# Patient Record
Sex: FEMALE | Race: WHITE | Employment: FULL TIME | URBAN - NONMETROPOLITAN AREA
[De-identification: names, ages, dates, MRNs, and addresses within clinical notes are randomized per-mention and may not be internally consistent; named-entity substitution may affect disease eponyms.]

---

## 2017-01-08 ENCOUNTER — OFFICE VISIT (OUTPATIENT)
Dept: PRIMARY CARE CLINIC | Age: 37
End: 2017-01-08

## 2017-01-08 VITALS
OXYGEN SATURATION: 95 % | SYSTOLIC BLOOD PRESSURE: 114 MMHG | DIASTOLIC BLOOD PRESSURE: 86 MMHG | HEART RATE: 63 BPM | TEMPERATURE: 97.6 F | BODY MASS INDEX: 19.88 KG/M2 | HEIGHT: 62 IN | RESPIRATION RATE: 16 BRPM | WEIGHT: 108 LBS

## 2017-01-08 DIAGNOSIS — G43.909 MIGRAINE WITHOUT STATUS MIGRAINOSUS, NOT INTRACTABLE, UNSPECIFIED MIGRAINE TYPE: Primary | ICD-10-CM

## 2017-01-08 PROCEDURE — 99213 OFFICE O/P EST LOW 20 MIN: CPT | Performed by: FAMILY MEDICINE

## 2017-01-08 PROCEDURE — 96372 THER/PROPH/DIAG INJ SC/IM: CPT | Performed by: FAMILY MEDICINE

## 2017-01-08 RX ORDER — PROMETHAZINE HYDROCHLORIDE 25 MG/ML
25 INJECTION, SOLUTION INTRAMUSCULAR; INTRAVENOUS ONCE
Status: COMPLETED | OUTPATIENT
Start: 2017-01-08 | End: 2017-01-08

## 2017-01-08 RX ORDER — KETOROLAC TROMETHAMINE 30 MG/ML
60 INJECTION, SOLUTION INTRAMUSCULAR; INTRAVENOUS ONCE
Status: COMPLETED | OUTPATIENT
Start: 2017-01-08 | End: 2017-01-08

## 2017-01-08 RX ADMIN — PROMETHAZINE HYDROCHLORIDE 25 MG: 25 INJECTION, SOLUTION INTRAMUSCULAR; INTRAVENOUS at 16:25

## 2017-01-08 RX ADMIN — KETOROLAC TROMETHAMINE 60 MG: 30 INJECTION, SOLUTION INTRAMUSCULAR; INTRAVENOUS at 16:22

## 2017-01-08 ASSESSMENT — ENCOUNTER SYMPTOMS
NAUSEA: 1
PHOTOPHOBIA: 1

## 2017-02-17 ENCOUNTER — OFFICE VISIT (OUTPATIENT)
Dept: PRIMARY CARE CLINIC | Age: 37
End: 2017-02-17

## 2017-02-17 VITALS
HEART RATE: 84 BPM | SYSTOLIC BLOOD PRESSURE: 112 MMHG | DIASTOLIC BLOOD PRESSURE: 78 MMHG | RESPIRATION RATE: 18 BRPM | WEIGHT: 109.8 LBS | BODY MASS INDEX: 20.2 KG/M2 | OXYGEN SATURATION: 98 % | TEMPERATURE: 100 F | HEIGHT: 62 IN

## 2017-02-17 DIAGNOSIS — R50.81 FEVER IN OTHER DISEASES: ICD-10-CM

## 2017-02-17 DIAGNOSIS — J06.9 VIRAL URI: Primary | ICD-10-CM

## 2017-02-17 LAB
INFLUENZA A ANTIBODY: NEGATIVE
INFLUENZA B ANTIBODY: NEGATIVE

## 2017-02-17 PROCEDURE — 99213 OFFICE O/P EST LOW 20 MIN: CPT | Performed by: NURSE PRACTITIONER

## 2017-02-17 PROCEDURE — 87804 INFLUENZA ASSAY W/OPTIC: CPT | Performed by: NURSE PRACTITIONER

## 2017-02-17 ASSESSMENT — ENCOUNTER SYMPTOMS
DIARRHEA: 0
CHOKING: 0
APNEA: 0
PHOTOPHOBIA: 0
COUGH: 1
NAUSEA: 1
WHEEZING: 0
EYE ITCHING: 0
VOICE CHANGE: 0
VOMITING: 0
EYE DISCHARGE: 0
EYE REDNESS: 0
SINUS PRESSURE: 1
SORE THROAT: 0
RHINORRHEA: 0
SHORTNESS OF BREATH: 0

## 2017-04-04 DIAGNOSIS — R55 NEUROCARDIOGENIC SYNCOPE: ICD-10-CM

## 2017-04-04 DIAGNOSIS — E78.2 MIXED HYPERLIPIDEMIA: ICD-10-CM

## 2017-04-04 DIAGNOSIS — R55 NEUROCARDIOGENIC SYNCOPE: Primary | ICD-10-CM

## 2017-04-04 RX ORDER — MIDODRINE HYDROCHLORIDE 5 MG/1
TABLET ORAL
Qty: 270 TABLET | Refills: 0 | Status: SHIPPED | OUTPATIENT
Start: 2017-04-04 | End: 2017-04-18 | Stop reason: SDUPTHER

## 2017-04-18 ENCOUNTER — OFFICE VISIT (OUTPATIENT)
Dept: FAMILY MEDICINE CLINIC | Age: 37
End: 2017-04-18
Payer: COMMERCIAL

## 2017-04-18 VITALS
HEIGHT: 61 IN | DIASTOLIC BLOOD PRESSURE: 70 MMHG | SYSTOLIC BLOOD PRESSURE: 100 MMHG | BODY MASS INDEX: 20.39 KG/M2 | HEART RATE: 72 BPM | WEIGHT: 108 LBS

## 2017-04-18 DIAGNOSIS — G43.009 MIGRAINE WITHOUT AURA AND WITHOUT STATUS MIGRAINOSUS, NOT INTRACTABLE: ICD-10-CM

## 2017-04-18 DIAGNOSIS — R55 NEUROCARDIOGENIC SYNCOPE: Primary | ICD-10-CM

## 2017-04-18 DIAGNOSIS — E78.2 MIXED HYPERLIPIDEMIA: ICD-10-CM

## 2017-04-18 DIAGNOSIS — Z72.0 TOBACCO ABUSE: ICD-10-CM

## 2017-04-18 DIAGNOSIS — N76.0 ACUTE VAGINITIS: ICD-10-CM

## 2017-04-18 DIAGNOSIS — M79.671 RIGHT FOOT PAIN: ICD-10-CM

## 2017-04-18 DIAGNOSIS — R39.9 LOWER URINARY TRACT SYMPTOMS: ICD-10-CM

## 2017-04-18 DIAGNOSIS — J30.2 SEASONAL ALLERGIC RHINITIS, UNSPECIFIED ALLERGIC RHINITIS TRIGGER: ICD-10-CM

## 2017-04-18 PROCEDURE — 87510 GARDNER VAG DNA DIR PROBE: CPT | Performed by: PHYSICIAN ASSISTANT

## 2017-04-18 PROCEDURE — 87480 CANDIDA DNA DIR PROBE: CPT | Performed by: PHYSICIAN ASSISTANT

## 2017-04-18 PROCEDURE — 99214 OFFICE O/P EST MOD 30 MIN: CPT | Performed by: PHYSICIAN ASSISTANT

## 2017-04-18 PROCEDURE — 87660 TRICHOMONAS VAGIN DIR PROBE: CPT | Performed by: PHYSICIAN ASSISTANT

## 2017-04-18 RX ORDER — SIMVASTATIN 20 MG
20 TABLET ORAL NIGHTLY
Qty: 90 TABLET | Refills: 1 | Status: SHIPPED | OUTPATIENT
Start: 2017-04-18 | End: 2017-12-12 | Stop reason: SDUPTHER

## 2017-04-18 RX ORDER — MIDODRINE HYDROCHLORIDE 5 MG/1
TABLET ORAL
Qty: 270 TABLET | Refills: 1 | Status: SHIPPED | OUTPATIENT
Start: 2017-04-18 | End: 2017-11-13 | Stop reason: SDUPTHER

## 2017-04-18 RX ORDER — NICOTINE 21 MG/24HR
1 PATCH, TRANSDERMAL 24 HOURS TRANSDERMAL EVERY 24 HOURS
Qty: 15 PATCH | Refills: 0 | Status: SHIPPED | OUTPATIENT
Start: 2017-04-18 | End: 2017-11-16 | Stop reason: ALTCHOICE

## 2017-04-18 RX ORDER — SUMATRIPTAN 100 MG/1
100 TABLET, FILM COATED ORAL
Qty: 9 TABLET | Refills: 3 | Status: SHIPPED | OUTPATIENT
Start: 2017-04-18 | End: 2017-12-12 | Stop reason: SDUPTHER

## 2017-04-18 RX ORDER — MONTELUKAST SODIUM 10 MG/1
10 TABLET ORAL NIGHTLY
Qty: 90 TABLET | Refills: 1 | Status: SHIPPED | OUTPATIENT
Start: 2017-04-18 | End: 2017-12-12 | Stop reason: SDUPTHER

## 2017-04-18 RX ORDER — OXYBUTYNIN CHLORIDE 5 MG/1
5 TABLET ORAL 3 TIMES DAILY
Qty: 90 TABLET | Refills: 5 | Status: SHIPPED | OUTPATIENT
Start: 2017-04-18 | End: 2017-12-12 | Stop reason: ALTCHOICE

## 2017-04-18 RX ORDER — FLUDROCORTISONE ACETATE 0.1 MG/1
TABLET ORAL
Qty: 90 TABLET | Refills: 1 | Status: SHIPPED | OUTPATIENT
Start: 2017-04-18 | End: 2017-12-12 | Stop reason: SDUPTHER

## 2017-04-18 RX ORDER — NICOTINE 21 MG/24HR
1 PATCH, TRANSDERMAL 24 HOURS TRANSDERMAL EVERY 24 HOURS
Qty: 45 PATCH | Refills: 0 | Status: SHIPPED | OUTPATIENT
Start: 2017-04-18 | End: 2018-05-02 | Stop reason: ALTCHOICE

## 2017-04-18 ASSESSMENT — PATIENT HEALTH QUESTIONNAIRE - PHQ9
2. FEELING DOWN, DEPRESSED OR HOPELESS: 0
1. LITTLE INTEREST OR PLEASURE IN DOING THINGS: 0
SUM OF ALL RESPONSES TO PHQ9 QUESTIONS 1 & 2: 0
SUM OF ALL RESPONSES TO PHQ QUESTIONS 1-9: 0

## 2017-04-19 LAB
DIRECT EXAM: NORMAL
Lab: NORMAL
SPECIMEN DESCRIPTION: NORMAL
SPECIMEN DESCRIPTION: NORMAL
STATUS: NORMAL

## 2017-05-06 ENCOUNTER — OFFICE VISIT (OUTPATIENT)
Dept: PRIMARY CARE CLINIC | Age: 37
End: 2017-05-06
Payer: COMMERCIAL

## 2017-05-06 VITALS
HEART RATE: 64 BPM | TEMPERATURE: 98.2 F | DIASTOLIC BLOOD PRESSURE: 74 MMHG | OXYGEN SATURATION: 98 % | HEIGHT: 62 IN | RESPIRATION RATE: 16 BRPM | WEIGHT: 111.4 LBS | BODY MASS INDEX: 20.5 KG/M2 | SYSTOLIC BLOOD PRESSURE: 112 MMHG

## 2017-05-06 DIAGNOSIS — J01.41 ACUTE RECURRENT PANSINUSITIS: Primary | ICD-10-CM

## 2017-05-06 PROCEDURE — 99213 OFFICE O/P EST LOW 20 MIN: CPT | Performed by: FAMILY MEDICINE

## 2017-05-06 RX ORDER — DOXYCYCLINE HYCLATE 100 MG/1
100 CAPSULE ORAL 2 TIMES DAILY
Qty: 20 CAPSULE | Refills: 0 | Status: SHIPPED | OUTPATIENT
Start: 2017-05-06 | End: 2017-05-16

## 2017-05-06 ASSESSMENT — ENCOUNTER SYMPTOMS
COUGH: 1
VOMITING: 0
SINUS COMPLAINT: 1
RHINORRHEA: 0
DIARRHEA: 0
WHEEZING: 0
NAUSEA: 0
SINUS PRESSURE: 1
SHORTNESS OF BREATH: 1
SORE THROAT: 1

## 2017-10-26 ENCOUNTER — TELEPHONE (OUTPATIENT)
Dept: PRIMARY CARE CLINIC | Age: 37
End: 2017-10-26

## 2017-11-13 DIAGNOSIS — R55 NEUROCARDIOGENIC SYNCOPE: ICD-10-CM

## 2017-11-13 RX ORDER — MIDODRINE HYDROCHLORIDE 5 MG/1
TABLET ORAL
Qty: 270 TABLET | Refills: 0 | Status: SHIPPED | OUTPATIENT
Start: 2017-11-13 | End: 2017-12-12 | Stop reason: SDUPTHER

## 2017-11-16 ENCOUNTER — OFFICE VISIT (OUTPATIENT)
Dept: PRIMARY CARE CLINIC | Age: 37
End: 2017-11-16
Payer: COMMERCIAL

## 2017-11-16 VITALS
TEMPERATURE: 97.4 F | RESPIRATION RATE: 12 BRPM | HEART RATE: 69 BPM | WEIGHT: 107.2 LBS | DIASTOLIC BLOOD PRESSURE: 66 MMHG | SYSTOLIC BLOOD PRESSURE: 118 MMHG | BODY MASS INDEX: 20.24 KG/M2 | HEIGHT: 61 IN | OXYGEN SATURATION: 98 %

## 2017-11-16 DIAGNOSIS — J01.90 ACUTE BACTERIAL SINUSITIS: Primary | ICD-10-CM

## 2017-11-16 DIAGNOSIS — B96.89 ACUTE BACTERIAL SINUSITIS: Primary | ICD-10-CM

## 2017-11-16 PROCEDURE — 99213 OFFICE O/P EST LOW 20 MIN: CPT | Performed by: NURSE PRACTITIONER

## 2017-11-16 RX ORDER — AZITHROMYCIN 250 MG/1
TABLET, FILM COATED ORAL
Qty: 1 PACKET | Refills: 0 | Status: SHIPPED | OUTPATIENT
Start: 2017-11-16 | End: 2017-11-26

## 2017-11-16 ASSESSMENT — ENCOUNTER SYMPTOMS
CONSTIPATION: 0
COUGH: 1
CHEST TIGHTNESS: 0
TROUBLE SWALLOWING: 0
DIARRHEA: 0
SINUS PRESSURE: 1
SHORTNESS OF BREATH: 0
NAUSEA: 0
ABDOMINAL PAIN: 0
ALLERGIC/IMMUNOLOGIC NEGATIVE: 1
EYES NEGATIVE: 1
VOMITING: 0
RHINORRHEA: 1
SINUS PAIN: 1

## 2017-11-16 NOTE — PROGRESS NOTES
[address] [facility]  [address], [address] Wendi Luna  [phone]      Rm Barney is a 42 y.o. female who is c/o of Head Congestion (started about a week ago, ear pain-feels like she has water in them)      HPI:     HPI Patient in today for an acute visit for symptoms of head congestion and otalgia of her ear that started a week ago. Jesús fevers. Her  is sick with the same symptoms . States that she has tried Sudafed and cough syrup with some relief with the cough syrup. States that her cough is worse at night. States that she does take allegra routinely. Subjective:      Review of Systems   Constitutional: Negative for activity change, appetite change and fever. HENT: Positive for congestion, ear pain, postnasal drip, rhinorrhea, sinus pain, sinus pressure and sneezing. Negative for ear discharge and trouble swallowing. Eyes: Negative. Respiratory: Positive for cough (productive and worse at night). Negative for chest tightness and shortness of breath. Cardiovascular: Negative for chest pain and palpitations. Gastrointestinal: Negative for abdominal pain, constipation, diarrhea, nausea and vomiting. Endocrine: Negative. Genitourinary: Negative for difficulty urinating and dysuria. Musculoskeletal: Negative. Skin: Negative. Allergic/Immunologic: Negative. Neurological: Negative for dizziness, light-headedness and headaches. Hematological: Negative. Psychiatric/Behavioral: Negative. Objective:     Vitals:    11/16/17 1035   BP: 118/66   Site: Right Arm   Position: Sitting   Cuff Size: Medium Adult   Pulse: 69   Resp: 12   Temp: 97.4 °F (36.3 °C)   TempSrc: Tympanic   SpO2: 98%   Weight: 107 lb 3.2 oz (48.6 kg)   Height: 5' 0.75\" (1.543 m)     Physical Exam   Constitutional: She is oriented to person, place, and time. She appears well-developed and well-nourished. HENT:   Head: Normocephalic and atraumatic.

## 2017-12-12 ENCOUNTER — OFFICE VISIT (OUTPATIENT)
Dept: FAMILY MEDICINE CLINIC | Age: 37
End: 2017-12-12
Payer: COMMERCIAL

## 2017-12-12 VITALS
HEIGHT: 61 IN | HEART RATE: 76 BPM | DIASTOLIC BLOOD PRESSURE: 62 MMHG | SYSTOLIC BLOOD PRESSURE: 122 MMHG | WEIGHT: 106.2 LBS | BODY MASS INDEX: 20.05 KG/M2

## 2017-12-12 DIAGNOSIS — Z23 NEED FOR PNEUMOCOCCAL VACCINATION: ICD-10-CM

## 2017-12-12 DIAGNOSIS — J01.41 ACUTE RECURRENT PANSINUSITIS: ICD-10-CM

## 2017-12-12 DIAGNOSIS — G43.009 MIGRAINE WITHOUT AURA AND WITHOUT STATUS MIGRAINOSUS, NOT INTRACTABLE: ICD-10-CM

## 2017-12-12 DIAGNOSIS — J32.4 CHRONIC PANSINUSITIS: ICD-10-CM

## 2017-12-12 DIAGNOSIS — R39.9 LOWER URINARY TRACT SYMPTOMS: ICD-10-CM

## 2017-12-12 DIAGNOSIS — R55 NEUROCARDIOGENIC SYNCOPE: ICD-10-CM

## 2017-12-12 DIAGNOSIS — E78.2 MIXED HYPERLIPIDEMIA: Primary | ICD-10-CM

## 2017-12-12 DIAGNOSIS — Z23 NEED FOR TDAP VACCINATION: ICD-10-CM

## 2017-12-12 PROCEDURE — 99214 OFFICE O/P EST MOD 30 MIN: CPT | Performed by: PHYSICIAN ASSISTANT

## 2017-12-12 RX ORDER — SIMVASTATIN 20 MG
20 TABLET ORAL NIGHTLY
Qty: 90 TABLET | Refills: 1 | Status: SHIPPED | OUTPATIENT
Start: 2017-12-12 | End: 2018-12-18 | Stop reason: SDUPTHER

## 2017-12-12 RX ORDER — FLUDROCORTISONE ACETATE 0.1 MG/1
TABLET ORAL
Qty: 90 TABLET | Refills: 1 | Status: SHIPPED | OUTPATIENT
Start: 2017-12-12 | End: 2018-12-18 | Stop reason: SDUPTHER

## 2017-12-12 RX ORDER — MONTELUKAST SODIUM 10 MG/1
10 TABLET ORAL NIGHTLY
Qty: 90 TABLET | Refills: 1 | Status: SHIPPED | OUTPATIENT
Start: 2017-12-12 | End: 2018-12-18 | Stop reason: SDUPTHER

## 2017-12-12 RX ORDER — MIDODRINE HYDROCHLORIDE 5 MG/1
TABLET ORAL
Qty: 270 TABLET | Refills: 1 | Status: SHIPPED | OUTPATIENT
Start: 2017-12-12 | End: 2018-12-18 | Stop reason: SDUPTHER

## 2017-12-12 RX ORDER — SUMATRIPTAN 100 MG/1
100 TABLET, FILM COATED ORAL
Qty: 9 TABLET | Refills: 3 | Status: SHIPPED | OUTPATIENT
Start: 2017-12-12 | End: 2018-10-28 | Stop reason: ALTCHOICE

## 2017-12-12 RX ORDER — SULFAMETHOXAZOLE AND TRIMETHOPRIM 800; 160 MG/1; MG/1
1 TABLET ORAL 2 TIMES DAILY
Qty: 20 TABLET | Refills: 0 | Status: SHIPPED | OUTPATIENT
Start: 2017-12-12 | End: 2018-03-10 | Stop reason: SDUPTHER

## 2017-12-12 RX ORDER — TOLTERODINE 4 MG/1
4 CAPSULE, EXTENDED RELEASE ORAL DAILY
Qty: 30 CAPSULE | Refills: 6 | Status: SHIPPED | OUTPATIENT
Start: 2017-12-12 | End: 2018-05-02

## 2017-12-17 NOTE — PROGRESS NOTES
affect. Assessment:      1. Mixed hyperlipidemia  simvastatin (ZOCOR) 20 MG tablet   2. Neurocardiogenic syncope  metoprolol tartrate (LOPRESSOR) 25 MG tablet    fludrocortisone (FLORINEF) 0.1 MG tablet    midodrine (PROAMATINE) 5 MG tablet   3. Migraine without aura and without status migrainosus, not intractable  SUMAtriptan (IMITREX) 100 MG tablet   4. Lower urinary tract symptoms  tolterodine (DETROL LA) 4 MG extended release capsule   5. Acute recurrent pansinusitis  sulfamethoxazole-trimethoprim (BACTRIM DS) 800-160 MG per tablet   6. Chronic pansinusitis  montelukast (SINGULAIR) 10 MG tablet   7. Need for Tdap vaccination  Tdap (age 6y and older) IM ([employer])   8. Need for pneumococcal vaccination  Pneumococcal polysaccharide vaccine 23-valent >= 1yo subcutaneous/IM (PNEUMOVAX 23)           Plan:      Update fasting laboratory studies. Healthy diet and routine exercise. Trial of Detrol LA 4 mg daily. Continue Singulair. Bactrim DS bid x 10 days. Nasal saline rinses. Continue chronic medications which were refilled for patient. Prescriptions for Tdap and Pneumovax given to patient. Follow-up in six months/sooner PRN.

## 2017-12-26 ENCOUNTER — TELEPHONE (OUTPATIENT)
Dept: FAMILY MEDICINE CLINIC | Age: 37
End: 2017-12-26

## 2017-12-30 ENCOUNTER — OFFICE VISIT (OUTPATIENT)
Dept: PRIMARY CARE CLINIC | Age: 37
End: 2017-12-30
Payer: COMMERCIAL

## 2017-12-30 ENCOUNTER — HOSPITAL ENCOUNTER (OUTPATIENT)
Dept: LAB | Age: 37
Setting detail: SPECIMEN
Discharge: HOME | End: 2017-12-30
Payer: COMMERCIAL

## 2017-12-30 VITALS
HEIGHT: 61 IN | TEMPERATURE: 97.3 F | SYSTOLIC BLOOD PRESSURE: 110 MMHG | OXYGEN SATURATION: 97 % | DIASTOLIC BLOOD PRESSURE: 70 MMHG | WEIGHT: 105 LBS | HEART RATE: 100 BPM | BODY MASS INDEX: 19.83 KG/M2

## 2017-12-30 DIAGNOSIS — G43.009 MIGRAINE WITHOUT AURA AND WITHOUT STATUS MIGRAINOSUS, NOT INTRACTABLE: Primary | ICD-10-CM

## 2017-12-30 DIAGNOSIS — E78.2 MIXED HYPERLIPIDEMIA: ICD-10-CM

## 2017-12-30 LAB
ALBUMIN SERPL-MCNC: 4.9 G/DL (ref 3.5–5.2)
ALBUMIN/GLOBULIN RATIO: 1.7 (ref 1–2.5)
ALP BLD-CCNC: 77 U/L (ref 35–104)
ALT SERPL-CCNC: 17 U/L (ref 5–33)
ANION GAP SERPL CALCULATED.3IONS-SCNC: 14 MMOL/L (ref 9–17)
AST SERPL-CCNC: 25 U/L
BILIRUB SERPL-MCNC: 0.22 MG/DL (ref 0.3–1.2)
BUN BLDV-MCNC: 17 MG/DL (ref 6–20)
BUN/CREAT BLD: 25 (ref 9–20)
CALCIUM SERPL-MCNC: 9.9 MG/DL (ref 8.6–10.4)
CHLORIDE BLD-SCNC: 99 MMOL/L (ref 98–107)
CHOLESTEROL/HDL RATIO: 5.1
CHOLESTEROL: 204 MG/DL
CO2: 27 MMOL/L (ref 20–31)
CREAT SERPL-MCNC: 0.68 MG/DL (ref 0.5–0.9)
GFR AFRICAN AMERICAN: >60 ML/MIN
GFR NON-AFRICAN AMERICAN: >60 ML/MIN
GFR SERPL CREATININE-BSD FRML MDRD: ABNORMAL ML/MIN/{1.73_M2}
GFR SERPL CREATININE-BSD FRML MDRD: ABNORMAL ML/MIN/{1.73_M2}
GLUCOSE BLD-MCNC: 103 MG/DL (ref 70–99)
HDLC SERPL-MCNC: 40 MG/DL
LDL CHOLESTEROL: 129 MG/DL (ref 0–130)
POTASSIUM SERPL-SCNC: 4.5 MMOL/L (ref 3.7–5.3)
SODIUM BLD-SCNC: 140 MMOL/L (ref 135–144)
TOTAL PROTEIN: 7.8 G/DL (ref 6.4–8.3)
TRIGL SERPL-MCNC: 173 MG/DL
VLDLC SERPL CALC-MCNC: ABNORMAL MG/DL (ref 1–30)

## 2017-12-30 PROCEDURE — 80053 COMPREHEN METABOLIC PANEL: CPT

## 2017-12-30 PROCEDURE — 99213 OFFICE O/P EST LOW 20 MIN: CPT | Performed by: PHYSICIAN ASSISTANT

## 2017-12-30 PROCEDURE — 36415 COLL VENOUS BLD VENIPUNCTURE: CPT

## 2017-12-30 PROCEDURE — 80061 LIPID PANEL: CPT

## 2017-12-30 PROCEDURE — 96372 THER/PROPH/DIAG INJ SC/IM: CPT | Performed by: PHYSICIAN ASSISTANT

## 2017-12-30 RX ORDER — PROMETHAZINE HYDROCHLORIDE 25 MG/ML
25 INJECTION, SOLUTION INTRAMUSCULAR; INTRAVENOUS ONCE
Status: COMPLETED | OUTPATIENT
Start: 2017-12-30 | End: 2017-12-30

## 2017-12-30 RX ORDER — KETOROLAC TROMETHAMINE 30 MG/ML
60 INJECTION, SOLUTION INTRAMUSCULAR; INTRAVENOUS ONCE
Status: COMPLETED | OUTPATIENT
Start: 2017-12-30 | End: 2017-12-30

## 2017-12-30 RX ADMIN — PROMETHAZINE HYDROCHLORIDE 25 MG: 25 INJECTION, SOLUTION INTRAMUSCULAR; INTRAVENOUS at 11:00

## 2017-12-30 RX ADMIN — KETOROLAC TROMETHAMINE 60 MG: 30 INJECTION, SOLUTION INTRAMUSCULAR; INTRAVENOUS at 10:59

## 2017-12-30 ASSESSMENT — ENCOUNTER SYMPTOMS
NAUSEA: 1
VOMITING: 0

## 2018-03-10 ENCOUNTER — OFFICE VISIT (OUTPATIENT)
Dept: PRIMARY CARE CLINIC | Age: 38
End: 2018-03-10
Payer: COMMERCIAL

## 2018-03-10 VITALS
BODY MASS INDEX: 19.46 KG/M2 | TEMPERATURE: 97 F | OXYGEN SATURATION: 98 % | WEIGHT: 103 LBS | SYSTOLIC BLOOD PRESSURE: 100 MMHG | HEART RATE: 80 BPM | DIASTOLIC BLOOD PRESSURE: 78 MMHG

## 2018-03-10 DIAGNOSIS — J01.41 ACUTE RECURRENT PANSINUSITIS: ICD-10-CM

## 2018-03-10 PROCEDURE — 99214 OFFICE O/P EST MOD 30 MIN: CPT | Performed by: FAMILY MEDICINE

## 2018-03-10 RX ORDER — TRIAMCINOLONE ACETONIDE 40 MG/ML
40 INJECTION, SUSPENSION INTRA-ARTICULAR; INTRAMUSCULAR ONCE
Status: COMPLETED | OUTPATIENT
Start: 2018-03-10 | End: 2018-03-10

## 2018-03-10 RX ORDER — SULFAMETHOXAZOLE AND TRIMETHOPRIM 800; 160 MG/1; MG/1
1 TABLET ORAL 2 TIMES DAILY
Qty: 20 TABLET | Refills: 0 | Status: SHIPPED | OUTPATIENT
Start: 2018-03-10 | End: 2018-05-02 | Stop reason: SDUPTHER

## 2018-03-10 RX ADMIN — TRIAMCINOLONE ACETONIDE 40 MG: 40 INJECTION, SUSPENSION INTRA-ARTICULAR; INTRAMUSCULAR at 13:59

## 2018-03-10 ASSESSMENT — ENCOUNTER SYMPTOMS
CONSTIPATION: 0
TROUBLE SWALLOWING: 0
RHINORRHEA: 1
DIARRHEA: 0
ABDOMINAL PAIN: 0
NAUSEA: 0
WHEEZING: 0
SORE THROAT: 1
EYE REDNESS: 0
COUGH: 1
SINUS PRESSURE: 1
VOMITING: 0
SHORTNESS OF BREATH: 0
SINUS PAIN: 1
EYE DISCHARGE: 0

## 2018-05-02 ENCOUNTER — OFFICE VISIT (OUTPATIENT)
Dept: FAMILY MEDICINE CLINIC | Age: 38
End: 2018-05-02
Payer: COMMERCIAL

## 2018-05-02 VITALS
BODY MASS INDEX: 18.31 KG/M2 | DIASTOLIC BLOOD PRESSURE: 62 MMHG | HEIGHT: 61 IN | SYSTOLIC BLOOD PRESSURE: 98 MMHG | OXYGEN SATURATION: 98 % | HEART RATE: 77 BPM | WEIGHT: 97 LBS

## 2018-05-02 DIAGNOSIS — J32.4 CHRONIC PANSINUSITIS: Primary | ICD-10-CM

## 2018-05-02 DIAGNOSIS — J01.41 ACUTE RECURRENT PANSINUSITIS: ICD-10-CM

## 2018-05-02 PROCEDURE — 99213 OFFICE O/P EST LOW 20 MIN: CPT | Performed by: NURSE PRACTITIONER

## 2018-05-02 RX ORDER — SULFAMETHOXAZOLE AND TRIMETHOPRIM 800; 160 MG/1; MG/1
1 TABLET ORAL 2 TIMES DAILY
Qty: 20 TABLET | Refills: 0 | Status: SHIPPED | OUTPATIENT
Start: 2018-05-02 | End: 2018-05-12

## 2018-05-02 RX ORDER — FLUTICASONE PROPIONATE 50 MCG
1 SPRAY, SUSPENSION (ML) NASAL DAILY
Qty: 1 BOTTLE | Refills: 3 | Status: SHIPPED | OUTPATIENT
Start: 2018-05-02 | End: 2018-06-19

## 2018-05-02 ASSESSMENT — ENCOUNTER SYMPTOMS
VOMITING: 0
NAUSEA: 0
CONSTIPATION: 0
SINUS PRESSURE: 1
SINUS PAIN: 1
SHORTNESS OF BREATH: 0
ABDOMINAL PAIN: 0
DIARRHEA: 0
TROUBLE SWALLOWING: 0
CHEST TIGHTNESS: 0
ALLERGIC/IMMUNOLOGIC NEGATIVE: 1
EYES NEGATIVE: 1
COUGH: 1

## 2018-06-03 ENCOUNTER — OFFICE VISIT (OUTPATIENT)
Dept: PRIMARY CARE CLINIC | Age: 38
End: 2018-06-03
Payer: COMMERCIAL

## 2018-06-03 VITALS
WEIGHT: 93 LBS | SYSTOLIC BLOOD PRESSURE: 110 MMHG | DIASTOLIC BLOOD PRESSURE: 70 MMHG | BODY MASS INDEX: 17.57 KG/M2 | HEART RATE: 108 BPM | OXYGEN SATURATION: 98 %

## 2018-06-03 DIAGNOSIS — G43.111 INTRACTABLE MIGRAINE WITH AURA WITH STATUS MIGRAINOSUS: Primary | ICD-10-CM

## 2018-06-03 PROCEDURE — 96372 THER/PROPH/DIAG INJ SC/IM: CPT | Performed by: FAMILY MEDICINE

## 2018-06-03 PROCEDURE — 99214 OFFICE O/P EST MOD 30 MIN: CPT | Performed by: FAMILY MEDICINE

## 2018-06-03 RX ORDER — PROMETHAZINE HYDROCHLORIDE 25 MG/ML
25 INJECTION, SOLUTION INTRAMUSCULAR; INTRAVENOUS ONCE
Status: COMPLETED | OUTPATIENT
Start: 2018-06-03 | End: 2018-06-03

## 2018-06-03 RX ORDER — KETOROLAC TROMETHAMINE 30 MG/ML
30 INJECTION, SOLUTION INTRAMUSCULAR; INTRAVENOUS ONCE
Status: DISCONTINUED | OUTPATIENT
Start: 2018-06-03 | End: 2018-06-03

## 2018-06-03 RX ORDER — KETOROLAC TROMETHAMINE 30 MG/ML
30 INJECTION, SOLUTION INTRAMUSCULAR; INTRAVENOUS ONCE
Status: COMPLETED | OUTPATIENT
Start: 2018-06-03 | End: 2018-06-03

## 2018-06-03 RX ADMIN — PROMETHAZINE HYDROCHLORIDE 25 MG: 25 INJECTION, SOLUTION INTRAMUSCULAR; INTRAVENOUS at 15:46

## 2018-06-03 RX ADMIN — KETOROLAC TROMETHAMINE 30 MG: 30 INJECTION, SOLUTION INTRAMUSCULAR; INTRAVENOUS at 15:58

## 2018-06-03 ASSESSMENT — PATIENT HEALTH QUESTIONNAIRE - PHQ9
1. LITTLE INTEREST OR PLEASURE IN DOING THINGS: 0
SUM OF ALL RESPONSES TO PHQ9 QUESTIONS 1 & 2: 0
2. FEELING DOWN, DEPRESSED OR HOPELESS: 0
SUM OF ALL RESPONSES TO PHQ QUESTIONS 1-9: 0

## 2018-06-03 ASSESSMENT — ENCOUNTER SYMPTOMS
ABDOMINAL PAIN: 0
DIARRHEA: 0
NAUSEA: 1
RESPIRATORY NEGATIVE: 1
PHOTOPHOBIA: 1
VOMITING: 0
VISUAL CHANGE: 1

## 2018-06-12 ENCOUNTER — OFFICE VISIT (OUTPATIENT)
Dept: FAMILY MEDICINE CLINIC | Age: 38
End: 2018-06-12
Payer: COMMERCIAL

## 2018-06-12 VITALS
DIASTOLIC BLOOD PRESSURE: 50 MMHG | SYSTOLIC BLOOD PRESSURE: 90 MMHG | HEART RATE: 76 BPM | BODY MASS INDEX: 18.12 KG/M2 | HEIGHT: 61 IN | WEIGHT: 96 LBS

## 2018-06-12 DIAGNOSIS — E78.2 MIXED HYPERLIPIDEMIA: Primary | ICD-10-CM

## 2018-06-12 DIAGNOSIS — R39.9 LOWER URINARY TRACT SYMPTOMS: ICD-10-CM

## 2018-06-12 DIAGNOSIS — G43.111 INTRACTABLE MIGRAINE WITH AURA WITH STATUS MIGRAINOSUS: ICD-10-CM

## 2018-06-12 DIAGNOSIS — R55 NEUROCARDIOGENIC SYNCOPE: ICD-10-CM

## 2018-06-12 DIAGNOSIS — E55.9 VITAMIN D DEFICIENCY: ICD-10-CM

## 2018-06-12 DIAGNOSIS — R19.8 CHANGE IN BOWEL FUNCTION: ICD-10-CM

## 2018-06-12 PROCEDURE — 99214 OFFICE O/P EST MOD 30 MIN: CPT | Performed by: PHYSICIAN ASSISTANT

## 2018-06-12 ASSESSMENT — PATIENT HEALTH QUESTIONNAIRE - PHQ9
2. FEELING DOWN, DEPRESSED OR HOPELESS: 0
1. LITTLE INTEREST OR PLEASURE IN DOING THINGS: 0
SUM OF ALL RESPONSES TO PHQ QUESTIONS 1-9: 0
SUM OF ALL RESPONSES TO PHQ9 QUESTIONS 1 & 2: 0

## 2018-06-19 ENCOUNTER — INITIAL CONSULT (OUTPATIENT)
Dept: SURGERY | Age: 38
End: 2018-06-19
Payer: COMMERCIAL

## 2018-06-19 VITALS
TEMPERATURE: 96.8 F | RESPIRATION RATE: 14 BRPM | SYSTOLIC BLOOD PRESSURE: 114 MMHG | WEIGHT: 92.8 LBS | HEIGHT: 61 IN | HEART RATE: 66 BPM | DIASTOLIC BLOOD PRESSURE: 66 MMHG | BODY MASS INDEX: 17.52 KG/M2

## 2018-06-19 DIAGNOSIS — K58.2 IRRITABLE BOWEL SYNDROME WITH BOTH CONSTIPATION AND DIARRHEA: Primary | ICD-10-CM

## 2018-06-19 PROCEDURE — 99214 OFFICE O/P EST MOD 30 MIN: CPT | Performed by: SURGERY

## 2018-06-19 RX ORDER — LORATADINE AND PSEUDOEPHEDRINE 10; 240 MG/1; MG/1
1 TABLET, EXTENDED RELEASE ORAL DAILY
COMMUNITY
End: 2019-07-15

## 2018-06-19 ASSESSMENT — ENCOUNTER SYMPTOMS
VOICE CHANGE: 0
VOMITING: 0
NAUSEA: 0
BACK PAIN: 1
ABDOMINAL DISTENTION: 0
EYES NEGATIVE: 1
ABDOMINAL PAIN: 1
WHEEZING: 0
CHEST TIGHTNESS: 0
CONSTIPATION: 1
CHOKING: 1
SHORTNESS OF BREATH: 0
BLOOD IN STOOL: 0
TROUBLE SWALLOWING: 0
DIARRHEA: 1

## 2018-07-10 ENCOUNTER — OFFICE VISIT (OUTPATIENT)
Dept: SURGERY | Age: 38
End: 2018-07-10
Payer: COMMERCIAL

## 2018-07-10 VITALS
BODY MASS INDEX: 18.03 KG/M2 | SYSTOLIC BLOOD PRESSURE: 98 MMHG | DIASTOLIC BLOOD PRESSURE: 60 MMHG | HEART RATE: 58 BPM | HEIGHT: 62 IN | WEIGHT: 98 LBS | TEMPERATURE: 97.2 F

## 2018-07-10 DIAGNOSIS — K58.2 IRRITABLE BOWEL SYNDROME WITH BOTH CONSTIPATION AND DIARRHEA: Primary | ICD-10-CM

## 2018-07-10 PROCEDURE — 99212 OFFICE O/P EST SF 10 MIN: CPT | Performed by: SURGERY

## 2018-07-10 NOTE — PROGRESS NOTES
Has been feeling a little better. Less pain and less bowel problems. The day after her last visit her mother . Patient has been dealing with the issue surrounding her mothers death, so she has been able to be as diligent about the diet as she might have been otherwise. However she is feeling a little better, and is not interested in pursuing any additional diagnostic work up as this time. Continue with diet changes. If she has more problems I would be happy to see her back.     Follow up PRN

## 2018-07-16 ENCOUNTER — OFFICE VISIT (OUTPATIENT)
Dept: UROLOGY | Age: 38
End: 2018-07-16
Payer: COMMERCIAL

## 2018-07-16 VITALS
HEART RATE: 71 BPM | SYSTOLIC BLOOD PRESSURE: 102 MMHG | DIASTOLIC BLOOD PRESSURE: 68 MMHG | HEIGHT: 62 IN | OXYGEN SATURATION: 95 % | BODY MASS INDEX: 18.18 KG/M2 | WEIGHT: 98.8 LBS

## 2018-07-16 DIAGNOSIS — N39.46 MIXED STRESS AND URGE URINARY INCONTINENCE: Primary | ICD-10-CM

## 2018-07-16 PROCEDURE — 99204 OFFICE O/P NEW MOD 45 MIN: CPT | Performed by: UROLOGY

## 2018-07-16 NOTE — PROGRESS NOTES
encounter (Office Visit) with Elba Gar MD.       Amoxicillin; Other; and Pcn [penicillins]  History   Smoking Status    Current Every Day Smoker    Packs/day: 1.00    Years: 15.00    Types: Cigarettes   Smokeless Tobacco    Never Used     Comment: started smoking age 19, trying e-cigarettes      (If patient a smoker, smoking cessation counseling offered)   History   Alcohol Use    0.0 oz/week     Comment: RARE       REVIEW OF SYSTEMS:  Constitutional: negative  Eyes: negative  Respiratory: negative  Cardiovascular: negative  Gastrointestinal: negative  Genitourinary: see HPI  Musculoskeletal: negative  Skin: negative   Neurological: negative  Hematological/Lymphatic: negative  Psychological: negative      Physical Exam:    This a 34 y.o. female  Vitals:    07/16/18 1046   BP: 102/68   Pulse: 71   SpO2: 95%     Body mass index is 18.37 kg/m². Constitutional: Patient in no acute distress; Neuro: alert and oriented to person place and time. Psych: Mood and affect normal.  Skin: warm, dry  Lungs: Respiratory effort normal, Symmetric chest rise  Cardiovascular:  Normal peripheral pulses; Regular rate. Abdomen: Soft, non-tender, non-distended with no CVA, flank pain, hepatosplenomegaly or hernia. Kidneys normal.  Bladder non-tender and not distended. Lymphatics: no palpable lymphadenopathy  Minimal/no edema in bilateral lower extremities      Assessment and Plan        1. Mixed stress and urge urinary incontinence               Plan:        Gave vesicare 10 mg for one month  Follow up in one month for med check        Prescriptions Ordered:  No orders of the defined types were placed in this encounter. Orders Placed:  No orders of the defined types were placed in this encounter.            Leann Patel MD

## 2018-08-20 ENCOUNTER — OFFICE VISIT (OUTPATIENT)
Dept: UROLOGY | Age: 38
End: 2018-08-20
Payer: COMMERCIAL

## 2018-08-20 VITALS
SYSTOLIC BLOOD PRESSURE: 106 MMHG | DIASTOLIC BLOOD PRESSURE: 60 MMHG | HEIGHT: 62 IN | HEART RATE: 64 BPM | BODY MASS INDEX: 17.74 KG/M2 | WEIGHT: 96.4 LBS

## 2018-08-20 DIAGNOSIS — R35.1 NOCTURIA: ICD-10-CM

## 2018-08-20 DIAGNOSIS — N39.46 MIXED INCONTINENCE: Primary | ICD-10-CM

## 2018-08-20 PROCEDURE — 99213 OFFICE O/P EST LOW 20 MIN: CPT | Performed by: UROLOGY

## 2018-08-20 RX ORDER — OXYBUTYNIN CHLORIDE 10 MG/1
10 TABLET, EXTENDED RELEASE ORAL DAILY
Qty: 30 TABLET | Refills: 3 | Status: SHIPPED | OUTPATIENT
Start: 2018-08-20 | End: 2018-12-18 | Stop reason: SDUPTHER

## 2018-08-20 NOTE — PROGRESS NOTES
[address] [facility]  Dept: [phone]  Dept Fax: [phone]  Loc: [phone]    ELSY Marroquin, [address] [facility] Urology Office Note - Follow up Patient    Patient:  Lara Santana  YOB: 1980  Date: 8/20/2018    The patient is a 34 y.o. female who presents today for evaluation of the following problems: urinary frequency  Chief Complaint   Patient presents with    Incontinence     1 mo with vesicare, is helping    referred/consultation requested by PIERO Michel. HISTORY OF PRESENT ILLNESS:     Onset was  Years ago  Overall, the problem(s) are better  Severity is described as moderate. Associated Symptoms: No dysuria, no gross hematuria. Current Pain Severity: 0    Frequency. Some incontinence. Mixed incontinence urge>>>stress. Some hesitancy  No recurrent infections  No hx of kidney stones  No pads  Does not drink many bladder irritants. Had a hx of nocturia- was on nasal spray. Nocturia not as bothersome    Here after trying vesicare. It did help but was too expensive        Requested/reviewed records from Brandi Michelma office and/or outside physician/EMR    (Patient's old records have been requested, reviewed and pertinent findings summarized in today's note.)    Procedures Today: N/A    Last several PSA's:  No results found for: PSA    Last total testosterone:  No results found for: TESTOSTERONE    Urinalysis today:  No results found for this visit on 08/20/18. Last BUN and creatinine:  Lab Results   Component Value Date    BUN 17 12/30/2017     Lab Results   Component Value Date    CREATININE 0.68 12/30/2017       Additional Lab/Culture results: none    Imaging Reviewed during this Office Visit:   Alisha Meek MD independently reviewed the images and verified the radiology reports from:    CT scan 2014    IMPRESSION:    No acute CT finding in the abdomen or pelvis. Nonvisualization the appendix. Status post hysterectomy. No right lower    quadrant inflammatory process, bowel obstruction, pathologic free fluid    or free air.       No obstructive uropathy or obvious stone; tiny stones could be missed on    this study.       Mild atelectatic or fibrotic changes LLL.       Report Electronically signed by Yuan Adams M.D. on 7/22/2014 9:15 AM   Transcribed by: [facility] on Jul 22 2014  9:17A    Read by: Fallon Mcintosh M.D.  [id number] on Jul 22 2014  9:17A    Electronically Signed by: Bashir Gamez.  Fallon Mcintosh M.D. on: [facility] 22 2014     9:17A          PAST MEDICAL, FAMILY AND SOCIAL HISTORY:  Past Medical History:   Diagnosis Date    Atrophic vaginitis 3/10/2016    Chronic fatigue     Constipation     Endometriosis     h/o endometriosis for which she had a hysterectomy    Genital herpes     Hyperlipidemia     Migraine     migraine cephalgia    Neurocardiogenic syncope     Ovarian cyst     Pelvic inflammatory disease (PID)     Sinusitis, chronic     chronic sinusitis probably related to tobacco abuse    Tobacco abuse     Vitamin D deficiency      Past Surgical History:   Procedure Laterality Date    APPENDECTOMY      LAPAROSCOPY      X3 before the hysterectomy    OTHER SURGICAL HISTORY  2016    lump removed lower right leg    KRISTIN AND BSO  2006    total with bilateral salpingo-oophorectomy    WISDOM TOOTH EXTRACTION       Family History   Problem Relation Age of Onset    Heart Disease Mother         heart murmur/valvular heart disease    Other Mother         family h/o diabetes    Migraines Mother         runs in females on her side of family   [facility] Freud Stroke Mother 62    Hypertension Mother    [facility] Parkinsonism Mother     Depression Mother     Other Father         family h/o diabetes    Diabetes Maternal Grandmother         diabetes    Diabetes Maternal Grandfather         diabetes    Cancer Paternal Grandfather 76        Acute Leukemia and infection/sudden death    Diabetes Maternal Uncle         Type 2

## 2018-10-28 ENCOUNTER — OFFICE VISIT (OUTPATIENT)
Dept: PRIMARY CARE CLINIC | Age: 38
End: 2018-10-28
Payer: COMMERCIAL

## 2018-10-28 VITALS
OXYGEN SATURATION: 97 % | BODY MASS INDEX: 17.55 KG/M2 | HEIGHT: 62 IN | RESPIRATION RATE: 16 BRPM | DIASTOLIC BLOOD PRESSURE: 78 MMHG | TEMPERATURE: 97.7 F | WEIGHT: 95.4 LBS | SYSTOLIC BLOOD PRESSURE: 110 MMHG | HEART RATE: 108 BPM

## 2018-10-28 DIAGNOSIS — F41.8 ANXIETY WITH DEPRESSION: Primary | ICD-10-CM

## 2018-10-28 DIAGNOSIS — J01.40 ACUTE NON-RECURRENT PANSINUSITIS: ICD-10-CM

## 2018-10-28 PROCEDURE — 99214 OFFICE O/P EST MOD 30 MIN: CPT | Performed by: PHYSICIAN ASSISTANT

## 2018-10-28 RX ORDER — CITALOPRAM 10 MG/1
10 TABLET ORAL DAILY
Qty: 30 TABLET | Refills: 0 | Status: SHIPPED | OUTPATIENT
Start: 2018-10-28 | End: 2018-12-03 | Stop reason: SDUPTHER

## 2018-10-28 RX ORDER — AZITHROMYCIN 500 MG/1
500 TABLET, FILM COATED ORAL DAILY
Qty: 1 PACKET | Refills: 0 | Status: SHIPPED | OUTPATIENT
Start: 2018-10-28 | End: 2018-10-31

## 2018-10-28 ASSESSMENT — ENCOUNTER SYMPTOMS
NAUSEA: 1
WHEEZING: 1
CHEST TIGHTNESS: 0
TROUBLE SWALLOWING: 0
SHORTNESS OF BREATH: 0
COUGH: 1
SINUS PRESSURE: 1
SINUS PAIN: 1
SORE THROAT: 0
VOMITING: 0
RHINORRHEA: 1
DIARRHEA: 0

## 2018-10-28 NOTE — PROGRESS NOTES
Subjective:      Patient ID: Shailesh Churchill is a 34 y.o. female. Patient is seen due to illness this past week and states yesterday was worse. Has a lot of congestion. No real fever or chills but gets hot and cold. The face hurts. An occasional motrin has been taken that is it. Is a smoker. Has a cough, it is occasionally productive not bad. Review of Systems   Constitutional: Positive for appetite change, chills and fatigue. Negative for fever. HENT: Positive for congestion, postnasal drip, rhinorrhea, sinus pain and sinus pressure. Negative for sneezing, sore throat and trouble swallowing. Respiratory: Positive for cough and wheezing. Negative for chest tightness and shortness of breath. A slight wheeze noted. Cardiovascular: Negative for chest pain. Gastrointestinal: Positive for nausea. Negative for diarrhea and vomiting. Musculoskeletal: Positive for myalgias. No flu shot yet. Neurological: Positive for dizziness, light-headedness and headaches. Psychiatric/Behavioral: Positive for sleep disturbance. The patient is nervous/anxious. Mind races this keeps her awake not the illness. She is overwhelmed with life. She going to [facility] counseling tomorrow hopefully. Has not been treated for anxiety in past.  Cries a lot at home. Mom  . She had to help make end of life decisions about mom and this upsets her worries if she did the right thing. Not getting things done at home. Goes to work no problems there. Talks to . Sometimes snappy and irritable. Frustrated at times. Having a hard time focusing. She overwhelms herself with everything. Does not where to begin. Not sleeping well in general exhausts herself during the week due to work so sleep ok during week. To cope just sleeps. Tries to pray. Talks to a friend too. Feels alone. Panic attacks almost daily. Nl at home. Sometimes feels  would be better without her.

## 2018-10-29 ENCOUNTER — TELEPHONE (OUTPATIENT)
Dept: FAMILY MEDICINE CLINIC | Age: 38
End: 2018-10-29

## 2018-10-29 NOTE — TELEPHONE ENCOUNTER
Pt calling stating that she is taking an antibiotic and is now having severe diarrhea. Please call pt.

## 2018-10-29 NOTE — TELEPHONE ENCOUNTER
This is nl   Could be due to combination of celexa and zpak  Take imodium and lets see how she does.

## 2018-10-29 NOTE — TELEPHONE ENCOUNTER
Spoke to Minerva Singh and she stated she has had 5 bm's that were watery in the last hour. No blood or mucus, and has 2 doses left.

## 2018-10-29 NOTE — TELEPHONE ENCOUNTER
Medication is Zithromax. NO nausea or vomiting. Taking medication with food. Treated by Casey Beltre so will forward note to her.

## 2018-11-05 ENCOUNTER — OFFICE VISIT (OUTPATIENT)
Dept: UROLOGY | Age: 38
End: 2018-11-05
Payer: COMMERCIAL

## 2018-11-05 VITALS
DIASTOLIC BLOOD PRESSURE: 60 MMHG | SYSTOLIC BLOOD PRESSURE: 106 MMHG | WEIGHT: 95.4 LBS | HEIGHT: 61 IN | BODY MASS INDEX: 18.01 KG/M2 | HEART RATE: 82 BPM

## 2018-11-05 DIAGNOSIS — N32.81 OAB (OVERACTIVE BLADDER): Primary | ICD-10-CM

## 2018-11-05 PROCEDURE — 99213 OFFICE O/P EST LOW 20 MIN: CPT | Performed by: UROLOGY

## 2018-11-13 ENCOUNTER — OFFICE VISIT (OUTPATIENT)
Dept: FAMILY MEDICINE CLINIC | Age: 38
End: 2018-11-13
Payer: COMMERCIAL

## 2018-11-13 ENCOUNTER — HOSPITAL ENCOUNTER (OUTPATIENT)
Dept: LAB | Age: 38
Discharge: HOME | End: 2018-11-13
Payer: COMMERCIAL

## 2018-11-13 VITALS
HEIGHT: 62 IN | DIASTOLIC BLOOD PRESSURE: 60 MMHG | BODY MASS INDEX: 17.26 KG/M2 | WEIGHT: 93.8 LBS | SYSTOLIC BLOOD PRESSURE: 102 MMHG | OXYGEN SATURATION: 98 % | RESPIRATION RATE: 18 BRPM | HEART RATE: 70 BPM

## 2018-11-13 DIAGNOSIS — J32.9 CHRONIC SINUSITIS, UNSPECIFIED LOCATION: ICD-10-CM

## 2018-11-13 DIAGNOSIS — Z87.891 PERSONAL HISTORY OF TOBACCO USE, PRESENTING HAZARDS TO HEALTH: ICD-10-CM

## 2018-11-13 DIAGNOSIS — E55.9 VITAMIN D DEFICIENCY: ICD-10-CM

## 2018-11-13 DIAGNOSIS — F41.9 ANXIETY: Primary | ICD-10-CM

## 2018-11-13 DIAGNOSIS — E78.2 MIXED HYPERLIPIDEMIA: ICD-10-CM

## 2018-11-13 DIAGNOSIS — G43.111 INTRACTABLE MIGRAINE WITH AURA WITH STATUS MIGRAINOSUS: ICD-10-CM

## 2018-11-13 LAB
ABSOLUTE EOS #: 0.3 K/UL (ref 0–0.4)
ABSOLUTE IMMATURE GRANULOCYTE: NORMAL K/UL (ref 0–0.3)
ABSOLUTE LYMPH #: 2.8 K/UL (ref 1–4.8)
ABSOLUTE MONO #: 0.7 K/UL (ref 0.1–1.2)
ALBUMIN SERPL-MCNC: 4.4 G/DL (ref 3.5–5.2)
ALBUMIN/GLOBULIN RATIO: 1.6 (ref 1–2.5)
ALP BLD-CCNC: 70 U/L (ref 35–104)
ALT SERPL-CCNC: 11 U/L (ref 5–33)
ANION GAP SERPL CALCULATED.3IONS-SCNC: 11 MMOL/L (ref 9–17)
AST SERPL-CCNC: 19 U/L
BASOPHILS # BLD: 1 % (ref 0–1)
BASOPHILS ABSOLUTE: 0.1 K/UL (ref 0–0.2)
BILIRUB SERPL-MCNC: 0.15 MG/DL (ref 0.3–1.2)
BUN BLDV-MCNC: 12 MG/DL (ref 6–20)
BUN/CREAT BLD: 16 (ref 9–20)
CALCIUM SERPL-MCNC: 9.3 MG/DL (ref 8.6–10.4)
CHLORIDE BLD-SCNC: 102 MMOL/L (ref 98–107)
CHOLESTEROL/HDL RATIO: 3.6
CHOLESTEROL: 151 MG/DL
CO2: 30 MMOL/L (ref 20–31)
CREAT SERPL-MCNC: 0.76 MG/DL (ref 0.5–0.9)
DIFFERENTIAL TYPE: NORMAL
EOSINOPHILS RELATIVE PERCENT: 3 % (ref 1–7)
GFR AFRICAN AMERICAN: >60 ML/MIN
GFR NON-AFRICAN AMERICAN: >60 ML/MIN
GFR SERPL CREATININE-BSD FRML MDRD: ABNORMAL ML/MIN/{1.73_M2}
GFR SERPL CREATININE-BSD FRML MDRD: ABNORMAL ML/MIN/{1.73_M2}
GLUCOSE BLD-MCNC: 71 MG/DL (ref 70–99)
HCT VFR BLD CALC: 38.8 % (ref 36–46)
HDLC SERPL-MCNC: 42 MG/DL
HEMOGLOBIN: 12.8 G/DL (ref 12–16)
IMMATURE GRANULOCYTES: NORMAL %
LDL CHOLESTEROL: 70 MG/DL (ref 0–130)
LYMPHOCYTES # BLD: 34 % (ref 16–46)
MCH RBC QN AUTO: 30.8 PG (ref 26–34)
MCHC RBC AUTO-ENTMCNC: 33 G/DL (ref 31–37)
MCV RBC AUTO: 93.3 FL (ref 80–100)
MONOCYTES # BLD: 8 % (ref 4–11)
NRBC AUTOMATED: NORMAL PER 100 WBC
PDW BLD-RTO: 13.1 % (ref 11–14.5)
PLATELET # BLD: 222 K/UL (ref 140–450)
PLATELET ESTIMATE: NORMAL
PMV BLD AUTO: 9.2 FL (ref 6–12)
POTASSIUM SERPL-SCNC: 3.8 MMOL/L (ref 3.7–5.3)
RBC # BLD: 4.16 M/UL (ref 4–5.2)
RBC # BLD: NORMAL 10*6/UL
SEG NEUTROPHILS: 54 % (ref 43–77)
SEGMENTED NEUTROPHILS ABSOLUTE COUNT: 4.4 K/UL (ref 1.8–7.7)
SODIUM BLD-SCNC: 143 MMOL/L (ref 135–144)
TOTAL PROTEIN: 7.2 G/DL (ref 6.4–8.3)
TRIGL SERPL-MCNC: 197 MG/DL
TSH SERPL DL<=0.05 MIU/L-ACNC: 1.92 MIU/L (ref 0.3–5)
VITAMIN D 25-HYDROXY: 50.6 NG/ML (ref 30–100)
VLDLC SERPL CALC-MCNC: ABNORMAL MG/DL (ref 1–30)
WBC # BLD: 8.2 K/UL (ref 3.5–11)
WBC # BLD: NORMAL 10*3/UL

## 2018-11-13 PROCEDURE — 82306 VITAMIN D 25 HYDROXY: CPT

## 2018-11-13 PROCEDURE — 84443 ASSAY THYROID STIM HORMONE: CPT

## 2018-11-13 PROCEDURE — 80053 COMPREHEN METABOLIC PANEL: CPT

## 2018-11-13 PROCEDURE — 99406 BEHAV CHNG SMOKING 3-10 MIN: CPT | Performed by: PHYSICIAN ASSISTANT

## 2018-11-13 PROCEDURE — 36415 COLL VENOUS BLD VENIPUNCTURE: CPT

## 2018-11-13 PROCEDURE — 85025 COMPLETE CBC W/AUTO DIFF WBC: CPT

## 2018-11-13 PROCEDURE — 80061 LIPID PANEL: CPT

## 2018-11-13 PROCEDURE — 99214 OFFICE O/P EST MOD 30 MIN: CPT | Performed by: PHYSICIAN ASSISTANT

## 2018-11-13 RX ORDER — NICOTINE 21 MG/24HR
1 PATCH, TRANSDERMAL 24 HOURS TRANSDERMAL EVERY 24 HOURS
Qty: 15 PATCH | Refills: 0 | Status: SHIPPED | OUTPATIENT
Start: 2018-11-13 | End: 2019-11-19 | Stop reason: SDUPTHER

## 2018-11-13 RX ORDER — NICOTINE 21 MG/24HR
1 PATCH, TRANSDERMAL 24 HOURS TRANSDERMAL EVERY 24 HOURS
Qty: 45 PATCH | Refills: 0 | Status: SHIPPED | OUTPATIENT
Start: 2018-11-13 | End: 2019-11-19 | Stop reason: SDUPTHER

## 2018-11-18 ASSESSMENT — ENCOUNTER SYMPTOMS
SINUS PRESSURE: 1
COUGH: 0
SINUS COMPLAINT: 1

## 2018-11-18 NOTE — PROGRESS NOTES
Subjective:      Patient ID: Jamar Denise is a 34 y.o. female. Mental Health Problem   The primary symptoms include dysphoric mood. The current episode started more than 1 month ago. The onset of the illness is precipitated by emotional stress (mother's death). The degree of incapacity that she is experiencing as a consequence of her illness is moderate. Additional symptoms of the illness include insomnia and agitation. Additional symptoms of the illness do not include anhedonia, feelings of worthlessness, distractible, poor judgment or headaches. She does not admit to suicidal ideas. She does not have a plan to commit suicide. Sinus Problem   This is a chronic problem. The current episode started more than 1 month ago. There has been no fever. Associated symptoms include congestion and sinus pressure. Pertinent negatives include no coughing or headaches. Patient has had initial intake with counseling at [facility]. She has another appointment scheduled for follow-up    Review of Systems   HENT: Positive for congestion and sinus pressure. Respiratory: Negative for cough. Neurological: Negative for headaches. Psychiatric/Behavioral: Positive for agitation and dysphoric mood. The patient has insomnia.       Past Medical History:   Diagnosis Date    Atrophic vaginitis 3/10/2016    Chronic fatigue     Constipation     Endometriosis     h/o endometriosis for which she had a hysterectomy    Genital herpes     Hyperlipidemia     Migraine     migraine cephalgia    Neurocardiogenic syncope     Ovarian cyst     Pelvic inflammatory disease (PID)     Sinusitis, chronic     chronic sinusitis probably related to tobacco abuse    Tobacco abuse     Vitamin D deficiency      Past Surgical History:   Procedure Laterality Date    APPENDECTOMY      LAPAROSCOPY      X3 before the hysterectomy    OTHER SURGICAL HISTORY  2016    lump removed lower right leg    KRISTIN AND BSO  2006    total with bilateral salpingo-oophorectomy    WISDOM TOOTH EXTRACTION       Social History   Substance Use Topics    Smoking status: Current Every Day Smoker     Packs/day: 1.00     Years: 15.00     Types: Cigarettes    Smokeless tobacco: Never Used      Comment: started smoking age 19, trying e-cigarettes    Alcohol use 0.0 oz/week      Comment: RARE       Objective:   Physical Exam   Constitutional: She is oriented to person, place, and time. She appears well-developed. HENT:   Head: Normocephalic. Right Ear: External ear normal.   Left Ear: External ear normal.   Cardiovascular: Normal rate and regular rhythm. Pulmonary/Chest: Effort normal.   Neurological: She is alert and oriented to person, place, and time. Skin: Skin is warm. Psychiatric: Her speech is normal. Thought content normal. She exhibits a depressed mood. Assessment:      1. Anxiety    2. Chronic sinusitis, unspecified location    3. Personal history of tobacco use, presenting hazards to health          Plan:      Continue citalopram at present dosage. Continue to follow with counseling. Discussed potential increase in citalopram dosage if no improvement in a couple of weeks. Sinuses appear clear at present. Continue ClaritinD. Nasal saline rinses. Nicoderm patches ordered. Follow-up as planned for management of chronic medical conditions/sooner PRN. PIERO RYDER    Tobacco Cessation Counseling: Patient advised about behavior change, including information about personal health harms, usage of appropriate cessation measures and benefits of cessation. Time spent (minutes): 3.

## 2018-12-03 DIAGNOSIS — F41.8 ANXIETY WITH DEPRESSION: ICD-10-CM

## 2018-12-03 RX ORDER — CITALOPRAM 10 MG/1
10 TABLET ORAL DAILY
Qty: 30 TABLET | Refills: 6 | Status: SHIPPED | OUTPATIENT
Start: 2018-12-03 | End: 2018-12-18 | Stop reason: SDUPTHER

## 2018-12-18 ENCOUNTER — OFFICE VISIT (OUTPATIENT)
Dept: FAMILY MEDICINE CLINIC | Age: 38
End: 2018-12-18
Payer: COMMERCIAL

## 2018-12-18 VITALS
WEIGHT: 98 LBS | BODY MASS INDEX: 18.03 KG/M2 | HEART RATE: 76 BPM | HEIGHT: 62 IN | SYSTOLIC BLOOD PRESSURE: 112 MMHG | DIASTOLIC BLOOD PRESSURE: 70 MMHG

## 2018-12-18 DIAGNOSIS — F41.8 ANXIETY WITH DEPRESSION: ICD-10-CM

## 2018-12-18 DIAGNOSIS — R55 NEUROCARDIOGENIC SYNCOPE: ICD-10-CM

## 2018-12-18 DIAGNOSIS — E78.2 MIXED HYPERLIPIDEMIA: Primary | ICD-10-CM

## 2018-12-18 DIAGNOSIS — G43.111 INTRACTABLE MIGRAINE WITH AURA WITH STATUS MIGRAINOSUS: ICD-10-CM

## 2018-12-18 DIAGNOSIS — J32.4 CHRONIC PANSINUSITIS: ICD-10-CM

## 2018-12-18 PROCEDURE — 99214 OFFICE O/P EST MOD 30 MIN: CPT | Performed by: PHYSICIAN ASSISTANT

## 2018-12-18 RX ORDER — FLUDROCORTISONE ACETATE 0.1 MG/1
TABLET ORAL
Qty: 90 TABLET | Refills: 1 | Status: SHIPPED | OUTPATIENT
Start: 2018-12-18 | End: 2019-07-25 | Stop reason: SDUPTHER

## 2018-12-18 RX ORDER — MONTELUKAST SODIUM 10 MG/1
10 TABLET ORAL NIGHTLY
Qty: 90 TABLET | Refills: 1 | Status: SHIPPED | OUTPATIENT
Start: 2018-12-18 | End: 2019-07-25 | Stop reason: SDUPTHER

## 2018-12-18 RX ORDER — OXYBUTYNIN CHLORIDE 10 MG/1
10 TABLET, EXTENDED RELEASE ORAL DAILY
Qty: 90 TABLET | Refills: 1 | Status: SHIPPED | OUTPATIENT
Start: 2018-12-18 | End: 2019-07-25 | Stop reason: SDUPTHER

## 2018-12-18 RX ORDER — SIMVASTATIN 20 MG
20 TABLET ORAL NIGHTLY
Qty: 90 TABLET | Refills: 1 | Status: SHIPPED | OUTPATIENT
Start: 2018-12-18 | End: 2019-07-25 | Stop reason: SDUPTHER

## 2018-12-18 RX ORDER — OXYBUTYNIN CHLORIDE 10 MG/1
10 TABLET, EXTENDED RELEASE ORAL DAILY
Qty: 30 TABLET | Refills: 3 | Status: CANCELLED | OUTPATIENT
Start: 2018-12-18

## 2018-12-18 RX ORDER — CITALOPRAM 20 MG/1
20 TABLET ORAL DAILY
Qty: 90 TABLET | Refills: 1 | Status: SHIPPED | OUTPATIENT
Start: 2018-12-18 | End: 2019-06-30 | Stop reason: SDUPTHER

## 2018-12-18 RX ORDER — MIDODRINE HYDROCHLORIDE 5 MG/1
TABLET ORAL
Qty: 270 TABLET | Refills: 1 | Status: SHIPPED | OUTPATIENT
Start: 2018-12-18 | End: 2019-07-25 | Stop reason: SDUPTHER

## 2018-12-18 ASSESSMENT — PATIENT HEALTH QUESTIONNAIRE - PHQ9
1. LITTLE INTEREST OR PLEASURE IN DOING THINGS: 0
2. FEELING DOWN, DEPRESSED OR HOPELESS: 1
SUM OF ALL RESPONSES TO PHQ QUESTIONS 1-9: 1
SUM OF ALL RESPONSES TO PHQ9 QUESTIONS 1 & 2: 1
SUM OF ALL RESPONSES TO PHQ QUESTIONS 1-9: 1

## 2018-12-24 ASSESSMENT — ENCOUNTER SYMPTOMS
COUGH: 0
SINUS PRESSURE: 1
SINUS COMPLAINT: 1

## 2018-12-24 NOTE — PROGRESS NOTES
sinus pressure. Respiratory: Negative for cough. Neurological: Positive for headaches. Psychiatric/Behavioral: Positive for agitation and dysphoric mood. The patient has insomnia. Past Medical History:   Diagnosis Date    Atrophic vaginitis 3/10/2016    Chronic fatigue     Constipation     Endometriosis     h/o endometriosis for which she had a hysterectomy    Genital herpes     Hyperlipidemia     Migraine     migraine cephalgia    Neurocardiogenic syncope     Ovarian cyst     Pelvic inflammatory disease (PID)     Sinusitis, chronic     chronic sinusitis probably related to tobacco abuse    Tobacco abuse     Vitamin D deficiency      Past Surgical History:   Procedure Laterality Date    APPENDECTOMY      LAPAROSCOPY      X3 before the hysterectomy    OTHER SURGICAL HISTORY  2016    lump removed lower right leg    KRISTIN AND BSO  2006    total with bilateral salpingo-oophorectomy    WISDOM TOOTH EXTRACTION       Social History   Substance Use Topics    Smoking status: Current Every Day Smoker     Packs/day: 1.00     Years: 15.00     Types: Cigarettes    Smokeless tobacco: Never Used      Comment: started smoking age 19, trying e-cigarettes    Alcohol use 0.0 oz/week      Comment: RARE       Objective:   Physical Exam   Constitutional: She is oriented to person, place, and time. She appears well-developed. HENT:   Head: Normocephalic. Right Ear: External ear normal.   Left Ear: External ear normal.   Mouth/Throat: Oropharynx is clear and moist.   Eyes: Pupils are equal, round, and reactive to light. Cardiovascular: Normal rate and regular rhythm. Pulmonary/Chest: Effort normal and breath sounds normal.   Abdominal: Soft. Neurological: She is alert and oriented to person, place, and time. Skin: Skin is warm and dry. Psychiatric: Her speech is normal. She is withdrawn. She exhibits a depressed mood. No visits with results within 2 Week(s) from this visit.    Latest

## 2019-01-03 DIAGNOSIS — R55 NEUROCARDIOGENIC SYNCOPE: ICD-10-CM

## 2019-02-20 DIAGNOSIS — E78.2 MIXED HYPERLIPIDEMIA: ICD-10-CM

## 2019-02-20 RX ORDER — SIMVASTATIN 20 MG
20 TABLET ORAL NIGHTLY
Qty: 90 TABLET | Refills: 1 | OUTPATIENT
Start: 2019-02-20

## 2019-02-26 ENCOUNTER — OFFICE VISIT (OUTPATIENT)
Dept: FAMILY MEDICINE CLINIC | Age: 39
End: 2019-02-26
Payer: COMMERCIAL

## 2019-02-26 ENCOUNTER — HOSPITAL ENCOUNTER (OUTPATIENT)
Dept: GENERAL RADIOLOGY | Age: 39
Discharge: HOME | End: 2019-02-28
Payer: COMMERCIAL

## 2019-02-26 VITALS
HEART RATE: 65 BPM | OXYGEN SATURATION: 98 % | BODY MASS INDEX: 19.63 KG/M2 | RESPIRATION RATE: 14 BRPM | DIASTOLIC BLOOD PRESSURE: 60 MMHG | HEIGHT: 61 IN | SYSTOLIC BLOOD PRESSURE: 122 MMHG | WEIGHT: 104 LBS | TEMPERATURE: 96.2 F

## 2019-02-26 DIAGNOSIS — R05.9 COUGH: ICD-10-CM

## 2019-02-26 DIAGNOSIS — J01.41 ACUTE RECURRENT PANSINUSITIS: ICD-10-CM

## 2019-02-26 DIAGNOSIS — F17.200 SMOKER: ICD-10-CM

## 2019-02-26 DIAGNOSIS — Z23 NEED FOR VACCINATION: Primary | ICD-10-CM

## 2019-02-26 PROCEDURE — 71046 X-RAY EXAM CHEST 2 VIEWS: CPT

## 2019-02-26 PROCEDURE — 99213 OFFICE O/P EST LOW 20 MIN: CPT | Performed by: PHYSICIAN ASSISTANT

## 2019-02-26 RX ORDER — AZITHROMYCIN 250 MG/1
250 TABLET, FILM COATED ORAL SEE ADMIN INSTRUCTIONS
Qty: 6 TABLET | Refills: 1 | Status: SHIPPED | OUTPATIENT
Start: 2019-02-26 | End: 2019-03-03

## 2019-02-26 ASSESSMENT — PATIENT HEALTH QUESTIONNAIRE - PHQ9
SUM OF ALL RESPONSES TO PHQ9 QUESTIONS 1 & 2: 0
SUM OF ALL RESPONSES TO PHQ QUESTIONS 1-9: 0
1. LITTLE INTEREST OR PLEASURE IN DOING THINGS: 0
2. FEELING DOWN, DEPRESSED OR HOPELESS: 0
SUM OF ALL RESPONSES TO PHQ QUESTIONS 1-9: 0

## 2019-02-26 ASSESSMENT — ENCOUNTER SYMPTOMS
SHORTNESS OF BREATH: 1
SORE THROAT: 1
TROUBLE SWALLOWING: 1
CHEST TIGHTNESS: 1
GASTROINTESTINAL NEGATIVE: 1
COUGH: 1
WHEEZING: 1
SINUS PRESSURE: 1
SINUS PAIN: 1
RHINORRHEA: 1

## 2019-04-18 ENCOUNTER — OFFICE VISIT (OUTPATIENT)
Dept: FAMILY MEDICINE CLINIC | Age: 39
End: 2019-04-18
Payer: COMMERCIAL

## 2019-04-18 VITALS
DIASTOLIC BLOOD PRESSURE: 68 MMHG | HEART RATE: 80 BPM | BODY MASS INDEX: 19.52 KG/M2 | SYSTOLIC BLOOD PRESSURE: 120 MMHG | RESPIRATION RATE: 12 BRPM | OXYGEN SATURATION: 98 % | TEMPERATURE: 98 F | WEIGHT: 103.4 LBS | HEIGHT: 61 IN

## 2019-04-18 DIAGNOSIS — B96.89 ACUTE BACTERIAL SINUSITIS: Primary | ICD-10-CM

## 2019-04-18 DIAGNOSIS — R06.02 SOB (SHORTNESS OF BREATH) ON EXERTION: ICD-10-CM

## 2019-04-18 DIAGNOSIS — J01.90 ACUTE BACTERIAL SINUSITIS: Primary | ICD-10-CM

## 2019-04-18 DIAGNOSIS — R05.9 COUGH: ICD-10-CM

## 2019-04-18 PROCEDURE — 99214 OFFICE O/P EST MOD 30 MIN: CPT | Performed by: NURSE PRACTITIONER

## 2019-04-18 PROCEDURE — 96372 THER/PROPH/DIAG INJ SC/IM: CPT | Performed by: NURSE PRACTITIONER

## 2019-04-18 RX ORDER — ALBUTEROL SULFATE 90 UG/1
2 AEROSOL, METERED RESPIRATORY (INHALATION) 4 TIMES DAILY PRN
Qty: 1 INHALER | Refills: 0 | Status: SHIPPED | OUTPATIENT
Start: 2019-04-18 | End: 2019-08-12

## 2019-04-18 RX ORDER — METHYLPREDNISOLONE SODIUM SUCCINATE 40 MG/ML
40 INJECTION, POWDER, LYOPHILIZED, FOR SOLUTION INTRAMUSCULAR; INTRAVENOUS ONCE
Status: DISCONTINUED | OUTPATIENT
Start: 2019-04-18 | End: 2019-04-18

## 2019-04-18 RX ORDER — METHYLPREDNISOLONE SODIUM SUCCINATE 40 MG/ML
40 INJECTION, POWDER, LYOPHILIZED, FOR SOLUTION INTRAMUSCULAR; INTRAVENOUS ONCE
Status: COMPLETED | OUTPATIENT
Start: 2019-04-18 | End: 2019-04-18

## 2019-04-18 RX ORDER — AZITHROMYCIN 250 MG/1
TABLET, FILM COATED ORAL
Qty: 1 PACKET | Refills: 0 | Status: SHIPPED | OUTPATIENT
Start: 2019-04-18 | End: 2019-05-30 | Stop reason: ALTCHOICE

## 2019-04-18 RX ADMIN — METHYLPREDNISOLONE SODIUM SUCCINATE 40 MG: 40 INJECTION, POWDER, LYOPHILIZED, FOR SOLUTION INTRAMUSCULAR; INTRAVENOUS at 11:00

## 2019-04-18 ASSESSMENT — ENCOUNTER SYMPTOMS
COUGH: 1
SHORTNESS OF BREATH: 1
SINUS PRESSURE: 1
GASTROINTESTINAL NEGATIVE: 1
SINUS PAIN: 1
CONSTIPATION: 0
RHINORRHEA: 1
SORE THROAT: 1
DIARRHEA: 0
NAUSEA: 0

## 2019-04-18 NOTE — PATIENT INSTRUCTIONS
Patient Education        Learning About Benefits From Quitting Smoking  How does quitting smoking make you healthier? If you're thinking about quitting smoking, you may have a few reasons to be smoke-free. Your health may be one of them. · When you quit smoking, you lower your risks for cancer, lung disease, heart attack, stroke, blood vessel disease, and blindness from macular degeneration. · When you're smoke-free, you get sick less often, and you heal faster. You are less likely to get colds, flu, bronchitis, and pneumonia. · As a nonsmoker, you may find that your mood is better and you are less stressed. When and how will you feel healthier? Quitting has real health benefits that start from day 1 of being smoke-free. And the longer you stay smoke-free, the healthier you get and the better you feel. The first hours  · After just 20 minutes, your blood pressure and heart rate go down. That means there's less stress on your heart and blood vessels. · Within 12 hours, the level of carbon monoxide in your blood drops back to normal. That makes room for more oxygen. With more oxygen in your body, you may notice that you have more energy than when you smoked. After 2 weeks  · Your lungs start to work better. · Your risk of heart attack starts to drop. After 1 month  · When your lungs are clear, you cough less and breathe deeper, so it's easier to be active. · Your sense of taste and smell return. That means you can enjoy food more than you have since you started smoking. Over the years  · After 1 year, your risk of heart disease is half what it would be if you kept smoking. · After 5 years, your risk of stroke starts to shrink. Within a few years after that, it's about the same as if you'd never smoked. · After 10 years, your risk of dying from lung cancer is cut by about half. And your risk for many other types of cancer is lower too. How would quitting help others in your life?   When you quit smoking, you improve the health of everyone who now breathes in your smoke. · Their heart, lung, and cancer risks drop, much like yours. · They are sick less. For babies and small children, living smoke-free means they're less likely to have ear infections, pneumonia, and bronchitis. · If you're a woman who is or will be pregnant someday, quitting smoking means a healthier . · Children who are close to you are less likely to become adult smokers. Where can you learn more? Go to https://NjinipePAAY.[employer]. org and sign in to your [employer] account. Enter [id number] [id number] [phone] in the [facility] box to learn more about \"Learning About Benefits From Quitting Smoking. \"     If you do not have an account, please click on the \"Sign Up Now\" link. Current as of: 2018  Content Version: 11.9  © 8177-7905 [employer]. Care instructions adapted under license by [facility] ([facility]). If you have questions about a medical condition or this instruction, always ask your healthcare professional. [address] any warranty or liability for your use of this information.

## 2019-04-18 NOTE — PROGRESS NOTES
[facility]    Subjective:     Patient ID: Nereida Gonzalez is a 37 y.o. y.o. female. Patient in for office for sinus symptoms. Sinusitis   This is a new problem. The current episode started in the past 7 days. The problem has been gradually worsening since onset. There has been no fever. Associated symptoms include congestion, coughing (not productive), headaches, shortness of breath (whith coughing spells), sinus pressure and a sore throat. (She has chronic issues with her sinuses. She is currently no taking her antihistamine. ) Treatments tried: OTC cough syrup with minimal effect.         Past Medical History:   Diagnosis Date    Atrophic vaginitis 3/10/2016    Chronic fatigue     Constipation     Endometriosis     h/o endometriosis for which she had a hysterectomy    Genital herpes     Hyperlipidemia     Migraine     migraine cephalgia    Neurocardiogenic syncope     Ovarian cyst     Pelvic inflammatory disease (PID)     Sinusitis, chronic     chronic sinusitis probably related to tobacco abuse    Tobacco abuse     Vitamin D deficiency        Past Surgical History:   Procedure Laterality Date    APPENDECTOMY      LAPAROSCOPY      X3 before the hysterectomy    OTHER SURGICAL HISTORY  2016    lump removed lower right leg    KRISTIN AND BSO  2006    total with bilateral salpingo-oophorectomy    WISDOM TOOTH EXTRACTION         Family History   Problem Relation Age of Onset    Heart Disease Mother         heart murmur/valvular heart disease    Other Mother         family h/o diabetes    Migraines Mother         runs in females on her side of family   [facility] Stroke Mother 62    Hypertension Mother    [facility] Parkinsonism Mother     Depression Mother     Other Father         family h/o diabetes    Diabetes Maternal Grandmother         diabetes    Diabetes Maternal Grandfather         diabetes    Cancer Paternal Grandfather 76        Acute Leukemia and infection/sudden death    distress. She has no wheezes. Musculoskeletal: Normal range of motion. Neurological: She is alert and oriented to person, place, and time. Skin: Skin is warm and dry. Psychiatric: She has a normal mood and affect. Her behavior is normal.   Nursing note and vitals reviewed. Assessment & Plan:      1. Acute bacterial sinusitis    - azithromycin (ZITHROMAX Z-SULMA) 250 MG tablet; Take 2 tablets (500 mg) on Day 1, and then take 1 tablet (250 mg) on days 2 through 5. Dispense: 1 packet; Refill: 0    2. Cough-acute new    - albuterol sulfate  (90 Base) MCG/ACT inhaler; Inhale 2 puffs into the lungs 4 times daily as needed for Wheezing  Dispense: 1 Inhaler; Refill: 0  - methylPREDNISolone sodium (SOLU-MEDROL) injection 40 mg    3. SOB (shortness of breath) on exertion-acute new    - methylPREDNISolone sodium (SOLU-MEDROL) injection 40 mg    Advised patient to continue supportive care. They also need to increase fluids and rest. Advised that patient can use OTC Tylenol and/or Ibuprofen as needed for discomfort or fever. If patient get significantly worse over the next three days (uncontrolled fever of 101 or higher, respiratory distress. Drea Bella ) they then can call my office for reevaluation or go to Urgent Care. Patient agrees with plan of care.        OSEI Davis CNP   4/18/2019 10:44 AM

## 2019-05-30 ENCOUNTER — OFFICE VISIT (OUTPATIENT)
Dept: FAMILY MEDICINE CLINIC | Age: 39
End: 2019-05-30
Payer: COMMERCIAL

## 2019-05-30 VITALS
TEMPERATURE: 96.3 F | WEIGHT: 107 LBS | HEART RATE: 64 BPM | HEIGHT: 62 IN | SYSTOLIC BLOOD PRESSURE: 120 MMHG | OXYGEN SATURATION: 93 % | BODY MASS INDEX: 19.69 KG/M2 | DIASTOLIC BLOOD PRESSURE: 68 MMHG

## 2019-05-30 DIAGNOSIS — J40 BRONCHITIS: Primary | ICD-10-CM

## 2019-05-30 PROCEDURE — 99213 OFFICE O/P EST LOW 20 MIN: CPT | Performed by: NURSE PRACTITIONER

## 2019-05-30 RX ORDER — PREDNISONE 20 MG/1
20 TABLET ORAL 2 TIMES DAILY
Qty: 10 TABLET | Refills: 0 | Status: SHIPPED | OUTPATIENT
Start: 2019-05-30 | End: 2019-06-04

## 2019-05-30 RX ORDER — AZITHROMYCIN 250 MG/1
TABLET, FILM COATED ORAL
Qty: 1 PACKET | Refills: 0 | Status: SHIPPED | OUTPATIENT
Start: 2019-05-30 | End: 2019-06-04

## 2019-05-30 ASSESSMENT — ENCOUNTER SYMPTOMS
WHEEZING: 1
SHORTNESS OF BREATH: 1
SORE THROAT: 0
COUGH: 1
RHINORRHEA: 0

## 2019-05-30 NOTE — PROGRESS NOTES
Subjective:      Patient ID: Dino Mackey is a 37 y.o. female. Cough   This is a new problem. The current episode started 1 to 4 weeks ago. The problem has been unchanged. The problem occurs every few minutes. The cough is productive of sputum. Associated symptoms include ear congestion, headaches, nasal congestion, postnasal drip, shortness of breath and wheezing. Pertinent negatives include no chest pain, ear pain, fever, rhinorrhea or sore throat. Nothing aggravates the symptoms. Risk factors for lung disease include smoking/tobacco exposure. She has tried a beta-agonist inhaler and OTC cough suppressant (hard candy) for the symptoms. The treatment provided mild relief.      Past Medical History:   Diagnosis Date    Atrophic vaginitis 3/10/2016    Chronic fatigue     Constipation     Endometriosis     h/o endometriosis for which she had a hysterectomy    Genital herpes     Hyperlipidemia     Migraine     migraine cephalgia    Neurocardiogenic syncope     Ovarian cyst     Pelvic inflammatory disease (PID)     Sinusitis, chronic     chronic sinusitis probably related to tobacco abuse    Tobacco abuse     Vitamin D deficiency        Past Surgical History:   Procedure Laterality Date    APPENDECTOMY      LAPAROSCOPY      X3 before the hysterectomy    OTHER SURGICAL HISTORY  2016    lump removed lower right leg    KRISTIN AND BSO  2006    total with bilateral salpingo-oophorectomy    WISDOM TOOTH EXTRACTION         Social History     Socioeconomic History    Marital status:      Spouse name: None    Number of children: None    Years of education: None    Highest education level: None   Occupational History    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     Employer: [employer]   Social Needs    Financial resource strain: None    Food insecurity:     Worry: None     Inability: None    Transportation needs:     Medical: None     Non-medical: None   Tobacco Use    Smoking status: Current Every Day Smoker     Packs/day: 1.00     Years: 15.00     Pack years: 15.00     Types: Cigarettes    Smokeless tobacco: Never Used    Tobacco comment: started smoking age 19, trying e-cigarettes   Substance and Sexual Activity    Alcohol use:  Yes     Alcohol/week: 0.0 oz     Comment: RARE    Drug use: No     Types: Cocaine     Comment: HAS BEEN OFF CRACK COCAINE FOR SEVERAL YEARS    Sexual activity: Yes     Partners: Male     Comment:    Lifestyle    Physical activity:     Days per week: None     Minutes per session: None    Stress: None   Relationships    Social connections:     Talks on phone: None     Gets together: None     Attends Adventism service: None     Active member of club or organization: None     Attends meetings of clubs or organizations: None     Relationship status: None    Intimate partner violence:     Fear of current or ex partner: None     Emotionally abused: None     Physically abused: None     Forced sexual activity: None   Other Topics Concern    None   Social History Narrative    None       Family History   Problem Relation Age of Onset    Heart Disease Mother         heart murmur/valvular heart disease    Other Mother         family h/o diabetes    Migraines Mother         runs in females on her side of family   Cherelle Carlito Stroke Mother 62    Hypertension Mother    Cherelleeverett Esteban Parkinsonism Mother     Depression Mother     Other Father         family h/o diabetes    Diabetes Maternal Grandmother         diabetes    Diabetes Maternal Grandfather         diabetes    Cancer Paternal Grandfather 76        Acute Leukemia and infection/sudden death    Diabetes Maternal Uncle         Type 2       Allergies   Allergen Reactions    Amoxicillin Nausea Only     GI UPSET    Other      seaosanal allergies      Pcn [Penicillins] Nausea Only     GI UPSET       Current Outpatient Medications   Medication Sig Dispense Refill    azithromycin (ZITHROMAX Z-SULMA) 250 MG tablet Two pills daily first ear pain, rhinorrhea and sore throat. Respiratory: Positive for cough, shortness of breath and wheezing. Cardiovascular: Negative for chest pain and palpitations. Neurological: Positive for headaches. Objective:   Physical Exam   Constitutional: She is oriented to person, place, and time. She appears well-developed and well-nourished. No distress. HENT:   Head: Normocephalic and atraumatic. Right Ear: Tympanic membrane is bulging. Left Ear: Tympanic membrane is bulging. Nose: Mucosal edema present. Right sinus exhibits no maxillary sinus tenderness and no frontal sinus tenderness. Left sinus exhibits no maxillary sinus tenderness and no frontal sinus tenderness. Mouth/Throat: Uvula is midline and mucous membranes are normal. Posterior oropharyngeal erythema (mild +PND) present. Neck: Neck supple. Cardiovascular: Normal rate, regular rhythm and normal heart sounds. Pulmonary/Chest: Effort normal. She has rhonchi in the right upper field and the left upper field. Neurological: She is alert and oriented to person, place, and time. Nursing note and vitals reviewed. Assessment:       Diagnosis Orders   1.  Bronchitis             Plan:      zpak as directed  Prednisone 20mg 1 tablet BID for 5 days  Continue inhaler and OTC cough medication  Supportive care  Push fluids  Return If symptoms do not improve or worsen   Return PRN         Reagan Denney APRN - CNP

## 2019-06-30 DIAGNOSIS — F41.8 ANXIETY WITH DEPRESSION: ICD-10-CM

## 2019-07-01 RX ORDER — CITALOPRAM 20 MG/1
TABLET ORAL
Qty: 90 TABLET | Refills: 0 | Status: SHIPPED | OUTPATIENT
Start: 2019-07-01 | End: 2019-07-25 | Stop reason: SDUPTHER

## 2019-07-15 ENCOUNTER — OFFICE VISIT (OUTPATIENT)
Dept: PRIMARY CARE CLINIC | Age: 39
End: 2019-07-15
Payer: COMMERCIAL

## 2019-07-15 VITALS
DIASTOLIC BLOOD PRESSURE: 90 MMHG | BODY MASS INDEX: 20.88 KG/M2 | RESPIRATION RATE: 16 BRPM | HEART RATE: 68 BPM | HEIGHT: 61 IN | SYSTOLIC BLOOD PRESSURE: 128 MMHG | WEIGHT: 110.6 LBS | TEMPERATURE: 97.3 F

## 2019-07-15 DIAGNOSIS — G43.109 MIGRAINE WITH TYPICAL AURA: Primary | ICD-10-CM

## 2019-07-15 PROCEDURE — 96372 THER/PROPH/DIAG INJ SC/IM: CPT | Performed by: NURSE PRACTITIONER

## 2019-07-15 PROCEDURE — 99213 OFFICE O/P EST LOW 20 MIN: CPT | Performed by: NURSE PRACTITIONER

## 2019-07-15 RX ORDER — KETOROLAC TROMETHAMINE 30 MG/ML
30 INJECTION, SOLUTION INTRAMUSCULAR; INTRAVENOUS ONCE
Status: COMPLETED | OUTPATIENT
Start: 2019-07-15 | End: 2019-07-15

## 2019-07-15 RX ORDER — PROMETHAZINE HYDROCHLORIDE 25 MG/ML
12.5 INJECTION, SOLUTION INTRAMUSCULAR; INTRAVENOUS ONCE
Status: COMPLETED | OUTPATIENT
Start: 2019-07-15 | End: 2019-07-15

## 2019-07-15 RX ADMIN — KETOROLAC TROMETHAMINE 30 MG: 30 INJECTION, SOLUTION INTRAMUSCULAR; INTRAVENOUS at 10:18

## 2019-07-15 RX ADMIN — PROMETHAZINE HYDROCHLORIDE 12.5 MG: 25 INJECTION, SOLUTION INTRAMUSCULAR; INTRAVENOUS at 10:20

## 2019-07-15 ASSESSMENT — ENCOUNTER SYMPTOMS
PHOTOPHOBIA: 1
NAUSEA: 1

## 2019-07-15 NOTE — PROGRESS NOTES
Param Loren  7/15/19    Chief Complaint   Patient presents with    Headache     migrane since last night took excedrin last night at 8:30 and 3am took ibuprophen. and is getting worse       HPI: Patient presents with an onset of a headache/migraine last night- ibuprofen and excederin have not helped. She has mild nausea and some photosensitivity with it. The headache is all right sided- these are typical migraine symptoms for her. She states Imitrex has not worked in the past.     Past MedHx:     Past Medical History:   Diagnosis Date    Atrophic vaginitis 3/10/2016    Chronic fatigue     Constipation     Endometriosis     h/o endometriosis for which she had a hysterectomy    Genital herpes     Hyperlipidemia     Migraine     migraine cephalgia    Neurocardiogenic syncope     Ovarian cyst     Pelvic inflammatory disease (PID)     Sinusitis, chronic     chronic sinusitis probably related to tobacco abuse    Tobacco abuse     Vitamin D deficiency         Past Surgical History:   Procedure Laterality Date    APPENDECTOMY      LAPAROSCOPY      X3 before the hysterectomy    OTHER SURGICAL HISTORY  2016    lump removed lower right leg    KRISTIN AND BSO  2006    total with bilateral salpingo-oophorectomy    WISDOM TOOTH EXTRACTION         Social Hx:     Social History     Tobacco Use    Smoking status: Current Every Day Smoker     Packs/day: 1.00     Years: 15.00     Pack years: 15.00     Types: Cigarettes    Smokeless tobacco: Never Used    Tobacco comment: started smoking age 19, trying e-cigarettes   Substance Use Topics    Alcohol use:  Yes     Alcohol/week: 0.0 standard drinks     Comment: RARE    Drug use: No     Types: Cocaine     Comment: HAS BEEN OFF CRACK COCAINE FOR SEVERAL YEARS       Lab Results   Component Value Date    LABA1C 5.2 11/05/2016     Lab Results   Component Value Date     11/05/2016     Lab Results   Component Value Date    WBC 8.2 11/13/2018    HGB 12.8 11/13/2018    HCT 38.8 11/13/2018    MCV 93.3 11/13/2018     11/13/2018     Lab Results   Component Value Date     11/13/2018    K 3.8 11/13/2018     11/13/2018    CO2 30 11/13/2018    BUN 12 11/13/2018    CREATININE 0.76 11/13/2018    GLUCOSE 71 11/13/2018    CALCIUM 9.3 11/13/2018    PROT 7.2 11/13/2018    LABALBU 4.4 11/13/2018    BILITOT 0.15 (L) 11/13/2018    ALKPHOS 70 11/13/2018    AST 19 11/13/2018    ALT 11 11/13/2018    LABGLOM >60 11/13/2018    GFRAA >60 11/13/2018       Outpatient Encounter Medications as of 7/15/2019   Medication Sig Dispense Refill    citalopram (CELEXA) 20 MG tablet TAKE ONE TABLET BY MOUTH DAILY 90 tablet 0    albuterol sulfate  (90 Base) MCG/ACT inhaler Inhale 2 puffs into the lungs 4 times daily as needed for Wheezing 1 Inhaler 0    metoprolol tartrate (LOPRESSOR) 25 MG tablet TAKE ONE TABLET BY MOUTH TWICE A  tablet 1    simvastatin (ZOCOR) 20 MG tablet Take 1 tablet by mouth nightly 90 tablet 1    montelukast (SINGULAIR) 10 MG tablet Take 1 tablet by mouth nightly 90 tablet 1    fludrocortisone (FLORINEF) 0.1 MG tablet TAKE ONE TABLET BY MOUTH DAILY 90 tablet 1    midodrine (PROAMATINE) 5 MG tablet TAKE ONE TABLET BY MOUTH THREE TIMES A DAY FOR BLOOD PRESSURE 270 tablet 1    oxybutynin (DITROPAN XL) 10 MG extended release tablet Take 1 tablet by mouth daily 90 tablet 1    diphenhydrAMINE (BENADRYL) 25 MG tablet Take 25 mg by mouth nightly as needed for Itching      vitamin D (CHOLECALCIFEROL) 1000 UNIT TABS tablet Take 2 tablets by mouth daily. gummies      Multiple Vitamins-Minerals (MULTIVITAMIN PO) Take 1 tablet by mouth daily.  BLACK COHOSH ROOT Take 600 mg by mouth 2 times daily as needed.  FOR HOT FLASHES      nicotine (NICODERM CQ) 21 MG/24HR Place 1 patch onto the skin every 24 hours 45 patch 0    nicotine (NICODERM CQ) 14 MG/24HR Place 1 patch onto the skin every 24 hours for 15 days 15 patch 0    nicotine (Shania Ward)

## 2019-07-15 NOTE — PATIENT INSTRUCTIONS
medicine for your migraines, take it as directed. You may have medicine that you take only when you get a migraine and medicine that you take all the time to help prevent migraines. ? If your doctor has prescribed medicine for when you get a headache, take it at the first sign of a migraine, unless your doctor has given you other instructions. ? If your doctor has prescribed medicine to prevent migraines, take it exactly as prescribed. Call your doctor if you think you are having a problem with your medicine. · Find healthy ways to deal with stress. Migraines are most common during or right after stressful times. Take time to relax before and after you do something that has caused a migraine in the past.  · Try to keep your muscles relaxed by keeping good posture. Check your jaw, face, neck, and shoulder muscles for tension. Try to relax them. When you sit at a desk, change positions often. And make sure to stretch for 30 seconds each hour. · Get plenty of sleep and exercise. · Eat meals on a regular schedule. Avoid foods and drinks that often trigger migraines. These include chocolate, alcohol (especially red wine and port), aspartame, monosodium glutamate (MSG), and some additives found in foods (such as hot dogs, sen, cold cuts, aged cheeses, and pickled foods). · Limit caffeine. Don't drink too much coffee, tea, or soda. But don't quit caffeine suddenly. That can also give you migraines. · Do not smoke or allow others to smoke around you. If you need help quitting, talk to your doctor about stop-smoking programs and medicines. These can increase your chances of quitting for good. · If you are taking birth control pills or hormone therapy, talk to your doctor about whether they are triggering your migraines. When should you call for help? Call 911 anytime you think you may need emergency care. For example, call if:    · You have signs of a stroke.  These may include:  ? Sudden numbness, paralysis, or

## 2019-07-25 ENCOUNTER — OFFICE VISIT (OUTPATIENT)
Dept: FAMILY MEDICINE CLINIC | Age: 39
End: 2019-07-25
Payer: COMMERCIAL

## 2019-07-25 VITALS
SYSTOLIC BLOOD PRESSURE: 122 MMHG | HEART RATE: 64 BPM | RESPIRATION RATE: 16 BRPM | OXYGEN SATURATION: 97 % | BODY MASS INDEX: 20.96 KG/M2 | HEIGHT: 61 IN | DIASTOLIC BLOOD PRESSURE: 72 MMHG | WEIGHT: 111 LBS | TEMPERATURE: 97.3 F

## 2019-07-25 DIAGNOSIS — J32.4 CHRONIC PANSINUSITIS: ICD-10-CM

## 2019-07-25 DIAGNOSIS — E78.2 MIXED HYPERLIPIDEMIA: Primary | ICD-10-CM

## 2019-07-25 DIAGNOSIS — R55 NEUROCARDIOGENIC SYNCOPE: ICD-10-CM

## 2019-07-25 DIAGNOSIS — F41.8 ANXIETY WITH DEPRESSION: ICD-10-CM

## 2019-07-25 DIAGNOSIS — R39.9 LOWER URINARY TRACT SYMPTOMS: ICD-10-CM

## 2019-07-25 PROCEDURE — 99213 OFFICE O/P EST LOW 20 MIN: CPT | Performed by: PHYSICIAN ASSISTANT

## 2019-07-25 RX ORDER — MONTELUKAST SODIUM 10 MG/1
10 TABLET ORAL NIGHTLY
Qty: 90 TABLET | Refills: 1 | Status: SHIPPED | OUTPATIENT
Start: 2019-07-25 | End: 2020-05-20

## 2019-07-25 RX ORDER — MIDODRINE HYDROCHLORIDE 5 MG/1
TABLET ORAL
Qty: 270 TABLET | Refills: 1 | Status: SHIPPED | OUTPATIENT
Start: 2019-07-25 | End: 2020-02-27

## 2019-07-25 RX ORDER — CITALOPRAM 20 MG/1
20 TABLET ORAL DAILY
Qty: 90 TABLET | Refills: 1 | Status: SHIPPED | OUTPATIENT
Start: 2019-07-25 | End: 2019-11-19 | Stop reason: SDUPTHER

## 2019-07-25 RX ORDER — FLUDROCORTISONE ACETATE 0.1 MG/1
TABLET ORAL
Qty: 90 TABLET | Refills: 1 | Status: SHIPPED | OUTPATIENT
Start: 2019-07-25 | End: 2020-03-09

## 2019-07-25 RX ORDER — OXYBUTYNIN CHLORIDE 10 MG/1
10 TABLET, EXTENDED RELEASE ORAL DAILY
Qty: 90 TABLET | Refills: 1 | Status: SHIPPED | OUTPATIENT
Start: 2019-07-25 | End: 2019-12-02 | Stop reason: SDUPTHER

## 2019-07-25 RX ORDER — SIMVASTATIN 20 MG
20 TABLET ORAL NIGHTLY
Qty: 90 TABLET | Refills: 1 | Status: SHIPPED | OUTPATIENT
Start: 2019-07-25 | End: 2020-03-30

## 2019-07-25 ASSESSMENT — ENCOUNTER SYMPTOMS
COUGH: 0
SINUS PRESSURE: 1
SINUS COMPLAINT: 1

## 2019-08-07 ENCOUNTER — TELEPHONE (OUTPATIENT)
Dept: FAMILY MEDICINE CLINIC | Age: 39
End: 2019-08-07

## 2019-08-12 ENCOUNTER — OFFICE VISIT (OUTPATIENT)
Dept: PRIMARY CARE CLINIC | Age: 39
End: 2019-08-12
Payer: COMMERCIAL

## 2019-08-12 VITALS
BODY MASS INDEX: 18.27 KG/M2 | RESPIRATION RATE: 16 BRPM | DIASTOLIC BLOOD PRESSURE: 82 MMHG | HEIGHT: 64 IN | TEMPERATURE: 97.7 F | OXYGEN SATURATION: 98 % | SYSTOLIC BLOOD PRESSURE: 130 MMHG | HEART RATE: 71 BPM | WEIGHT: 107 LBS

## 2019-08-12 DIAGNOSIS — G43.109 MIGRAINE WITH TYPICAL AURA: Primary | ICD-10-CM

## 2019-08-12 PROCEDURE — 96372 THER/PROPH/DIAG INJ SC/IM: CPT | Performed by: NURSE PRACTITIONER

## 2019-08-12 PROCEDURE — 99213 OFFICE O/P EST LOW 20 MIN: CPT | Performed by: NURSE PRACTITIONER

## 2019-08-12 RX ORDER — KETOROLAC TROMETHAMINE 15 MG/ML
30 INJECTION, SOLUTION INTRAMUSCULAR; INTRAVENOUS ONCE
Qty: 2 ML | Refills: 0
Start: 2019-08-12 | End: 2019-08-12

## 2019-08-12 RX ORDER — PROMETHAZINE HYDROCHLORIDE 25 MG/ML
6.25 INJECTION, SOLUTION INTRAMUSCULAR; INTRAVENOUS ONCE
Status: COMPLETED | OUTPATIENT
Start: 2019-08-12 | End: 2019-08-12

## 2019-08-12 RX ORDER — KETOROLAC TROMETHAMINE 30 MG/ML
30 INJECTION, SOLUTION INTRAMUSCULAR; INTRAVENOUS ONCE
Status: COMPLETED | OUTPATIENT
Start: 2019-08-12 | End: 2019-08-12

## 2019-08-12 RX ORDER — PROMETHAZINE HYDROCHLORIDE 25 MG/ML
12.5 INJECTION, SOLUTION INTRAMUSCULAR; INTRAVENOUS ONCE
Status: DISCONTINUED | OUTPATIENT
Start: 2019-08-12 | End: 2019-08-12

## 2019-08-12 RX ADMIN — PROMETHAZINE HYDROCHLORIDE 6.25 MG: 25 INJECTION, SOLUTION INTRAMUSCULAR; INTRAVENOUS at 12:08

## 2019-08-12 RX ADMIN — KETOROLAC TROMETHAMINE 30 MG: 30 INJECTION, SOLUTION INTRAMUSCULAR; INTRAVENOUS at 12:17

## 2019-08-12 ASSESSMENT — ENCOUNTER SYMPTOMS
RESPIRATORY NEGATIVE: 1
PHOTOPHOBIA: 1
NAUSEA: 1

## 2019-08-12 NOTE — LETTER
[facility]  [address]  [facility] [address]  Phone: [phone]  Fax: [address] [facility], APRN - CNP        August 12, 2019     Patient: Francy Hernandez   YOB: 1980   Date of Visit: 8/12/2019       To Whom it May Concern:    Bola Toro was seen in my clinic on 8/12/2019. She may return to work on 8/13/2019. If you have any questions or concerns, please don't hesitate to call.     Sincerely,         Asaf Izquierdo, OSEI - CNP

## 2019-08-12 NOTE — PROGRESS NOTES
Avel Sender  8/12/19    Chief Complaint   Patient presents with    Headache     migraine since 3am.works 3rd shift did not leave work took Ibuprophen at 8 a.m. has history of migraine        HPI: Patient presents with a migraine that came on at 3 am. She took ibuprofen, it feels worse. She rates the pain 8-9.  + nausea and photophobia. Requesting a work note for [employer]. She has not tried maintenance migraine meds. She thinks rain is a trigger. Gets a migraine on average once per month. Past MedHx:     Past Medical History:   Diagnosis Date    Atrophic vaginitis 3/10/2016    Chronic fatigue     Constipation     Endometriosis     h/o endometriosis for which she had a hysterectomy    Genital herpes     Hyperlipidemia     Migraine     migraine cephalgia    Neurocardiogenic syncope     Ovarian cyst     Pelvic inflammatory disease (PID)     Sinusitis, chronic     chronic sinusitis probably related to tobacco abuse    Tobacco abuse     Vitamin D deficiency         Past Surgical History:   Procedure Laterality Date    APPENDECTOMY      LAPAROSCOPY      X3 before the hysterectomy    OTHER SURGICAL HISTORY  2016    lump removed lower right leg    KRISTIN AND BSO  2006    total with bilateral salpingo-oophorectomy    WISDOM TOOTH EXTRACTION         Social Hx:     Social History     Tobacco Use    Smoking status: Current Every Day Smoker     Packs/day: 1.00     Years: 15.00     Pack years: 15.00     Types: Cigarettes    Smokeless tobacco: Never Used   Substance Use Topics    Alcohol use:  Yes     Alcohol/week: 0.0 standard drinks     Comment: RARE    Drug use: No     Types: Cocaine     Comment: HAS BEEN OFF CRACK COCAINE FOR SEVERAL YEARS       Lab Results   Component Value Date    LABA1C 5.2 11/05/2016     Lab Results   Component Value Date     11/05/2016     Lab Results   Component Value Date    WBC 8.2 11/13/2018    HGB 12.8 11/13/2018    HCT 38.8 11/13/2018    MCV 93.3 11/13/2018     11/13/2018     Lab Results   Component Value Date     11/13/2018    K 3.8 11/13/2018     11/13/2018    CO2 30 11/13/2018    BUN 12 11/13/2018    CREATININE 0.76 11/13/2018    GLUCOSE 71 11/13/2018    CALCIUM 9.3 11/13/2018    PROT 7.2 11/13/2018    LABALBU 4.4 11/13/2018    BILITOT 0.15 (L) 11/13/2018    ALKPHOS 70 11/13/2018    AST 19 11/13/2018    ALT 11 11/13/2018    LABGLOM >60 11/13/2018    GFRAA >60 11/13/2018       Outpatient Encounter Medications as of 8/12/2019   Medication Sig Dispense Refill    ketorolac (TORADOL) 15 MG/ML injection Inject 2 mLs into the muscle once for 1 dose 2 mL 0    metoprolol tartrate (LOPRESSOR) 25 MG tablet Take 1 tablet by mouth 2 times daily 180 tablet 1    simvastatin (ZOCOR) 20 MG tablet Take 1 tablet by mouth nightly 90 tablet 1    montelukast (SINGULAIR) 10 MG tablet Take 1 tablet by mouth nightly 90 tablet 1    fludrocortisone (FLORINEF) 0.1 MG tablet TAKE ONE TABLET BY MOUTH DAILY 90 tablet 1    midodrine (PROAMATINE) 5 MG tablet TAKE ONE TABLET BY MOUTH THREE TIMES A DAY FOR BLOOD PRESSURE 270 tablet 1    oxybutynin (DITROPAN XL) 10 MG extended release tablet Take 1 tablet by mouth daily 90 tablet 1    citalopram (CELEXA) 20 MG tablet Take 1 tablet by mouth daily 90 tablet 1    diphenhydrAMINE (BENADRYL) 25 MG tablet Take 25 mg by mouth nightly as needed for Itching      vitamin D (CHOLECALCIFEROL) 1000 UNIT TABS tablet Take 2 tablets by mouth daily. gummies      Multiple Vitamins-Minerals (MULTIVITAMIN PO) Take 1 tablet by mouth daily.  BLACK COHOSH ROOT Take 600 mg by mouth 2 times daily as needed.  FOR HOT FLASHES      [DISCONTINUED] albuterol sulfate  (90 Base) MCG/ACT inhaler Inhale 2 puffs into the lungs 4 times daily as needed for Wheezing (Patient not taking: Reported on 8/12/2019) 1 Inhaler 0    nicotine (NICODERM CQ) 21 MG/24HR Place 1 patch onto the skin every 24 hours 45 patch 0    nicotine (NICODERM CQ) 14 MG/24HR Place 1 patch onto the skin every 24 hours for 15 days 15 patch 0    nicotine (NICODERM CQ) 7 MG/24HR Place 1 patch onto the skin every 24 hours for 15 days 15 patch 0     Facility-Administered Encounter Medications as of 8/12/2019   Medication Dose Route Frequency Provider Last Rate Last Dose    promethazine (PHENERGAN) injection 6.25 mg  6.25 mg Intramuscular Once OSEI [place] - ASHLEY        [DISCONTINUED] promethazine (PHENERGAN) injection 12.5 mg  12.5 mg Intramuscular Once OSEI Washington - CNP           Review of Systems   Constitutional: Negative for fever. Eyes: Positive for photophobia. Respiratory: Negative. Cardiovascular: Negative. Gastrointestinal: Positive for nausea. Neurological: Positive for headaches. Physical Exam   Constitutional: She is oriented to person, place, and time. She appears well-developed and well-nourished. HENT:   Head: Normocephalic. Eyes: Pupils are equal, round, and reactive to light. Conjunctivae, EOM and lids are normal.   Neck: Normal range of motion. No thyromegaly present. Cardiovascular: Normal rate, regular rhythm and normal heart sounds. Pulmonary/Chest: Effort normal and breath sounds normal.   Musculoskeletal: Normal range of motion. Neurological: She is alert and oriented to person, place, and time. Skin: Skin is warm. Psychiatric: She has a normal mood and affect. Data reviewed:      :   Diagnosis Orders   1. Migraine with typical aura         PLAN:  Return if symptoms worsen or fail to improve. Orders Placed This Encounter   Medications    ketorolac (TORADOL) 15 MG/ML injection     Sig: Inject 2 mLs into the muscle once for 1 dose     Dispense:  2 mL     Refill:  0    DISCONTD: promethazine (PHENERGAN) injection 12.5 mg    promethazine (PHENERGAN) injection 6.25 mg     Patient given educational materials - see patient instructions.   Discussed use, benefit, and side effects of

## 2019-09-05 ENCOUNTER — TELEPHONE (OUTPATIENT)
Dept: FAMILY MEDICINE CLINIC | Age: 39
End: 2019-09-05

## 2019-10-17 ENCOUNTER — TELEPHONE (OUTPATIENT)
Dept: FAMILY MEDICINE CLINIC | Age: 39
End: 2019-10-17

## 2019-11-19 ENCOUNTER — OFFICE VISIT (OUTPATIENT)
Dept: FAMILY MEDICINE CLINIC | Age: 39
End: 2019-11-19
Payer: COMMERCIAL

## 2019-11-19 VITALS
DIASTOLIC BLOOD PRESSURE: 70 MMHG | BODY MASS INDEX: 19.46 KG/M2 | WEIGHT: 114 LBS | HEIGHT: 64 IN | SYSTOLIC BLOOD PRESSURE: 112 MMHG | HEART RATE: 72 BPM

## 2019-11-19 DIAGNOSIS — G47.00 INSOMNIA, UNSPECIFIED TYPE: ICD-10-CM

## 2019-11-19 DIAGNOSIS — F41.8 ANXIETY WITH DEPRESSION: Primary | ICD-10-CM

## 2019-11-19 DIAGNOSIS — Z72.0 TOBACCO ABUSE: ICD-10-CM

## 2019-11-19 PROCEDURE — 99213 OFFICE O/P EST LOW 20 MIN: CPT | Performed by: PHYSICIAN ASSISTANT

## 2019-11-19 RX ORDER — NICOTINE 21 MG/24HR
1 PATCH, TRANSDERMAL 24 HOURS TRANSDERMAL EVERY 24 HOURS
Qty: 45 PATCH | Refills: 0 | Status: SHIPPED | OUTPATIENT
Start: 2019-11-19 | End: 2020-08-31

## 2019-11-19 RX ORDER — CITALOPRAM 40 MG/1
40 TABLET ORAL DAILY
Qty: 90 TABLET | Refills: 1 | Status: SHIPPED | OUTPATIENT
Start: 2019-11-19 | End: 2020-07-01 | Stop reason: SDUPTHER

## 2019-11-19 RX ORDER — TRAZODONE HYDROCHLORIDE 50 MG/1
50 TABLET ORAL NIGHTLY PRN
Qty: 90 TABLET | Refills: 1 | Status: SHIPPED | OUTPATIENT
Start: 2019-11-19 | End: 2020-08-19 | Stop reason: ALTCHOICE

## 2019-11-19 RX ORDER — NICOTINE 21 MG/24HR
1 PATCH, TRANSDERMAL 24 HOURS TRANSDERMAL EVERY 24 HOURS
Qty: 15 PATCH | Refills: 0 | Status: SHIPPED | OUTPATIENT
Start: 2019-11-19 | End: 2020-08-31

## 2019-11-19 ASSESSMENT — ENCOUNTER SYMPTOMS
GASTROINTESTINAL NEGATIVE: 1
RESPIRATORY NEGATIVE: 1

## 2019-12-02 ENCOUNTER — OFFICE VISIT (OUTPATIENT)
Dept: UROLOGY | Age: 39
End: 2019-12-02
Payer: COMMERCIAL

## 2019-12-02 VITALS
WEIGHT: 113.98 LBS | SYSTOLIC BLOOD PRESSURE: 120 MMHG | DIASTOLIC BLOOD PRESSURE: 80 MMHG | BODY MASS INDEX: 19.46 KG/M2 | HEART RATE: 61 BPM | HEIGHT: 64 IN

## 2019-12-02 DIAGNOSIS — N32.81 OAB (OVERACTIVE BLADDER): Primary | ICD-10-CM

## 2019-12-02 PROCEDURE — 99212 OFFICE O/P EST SF 10 MIN: CPT | Performed by: UROLOGY

## 2019-12-02 RX ORDER — OXYBUTYNIN CHLORIDE 10 MG/1
10 TABLET, EXTENDED RELEASE ORAL DAILY
Qty: 90 TABLET | Refills: 3 | Status: SHIPPED | OUTPATIENT
Start: 2019-12-02 | End: 2020-12-07 | Stop reason: SDUPTHER

## 2020-01-02 ENCOUNTER — OFFICE VISIT (OUTPATIENT)
Dept: PRIMARY CARE CLINIC | Age: 40
End: 2020-01-02
Payer: COMMERCIAL

## 2020-01-02 VITALS
DIASTOLIC BLOOD PRESSURE: 66 MMHG | SYSTOLIC BLOOD PRESSURE: 114 MMHG | WEIGHT: 118.8 LBS | TEMPERATURE: 97.4 F | HEIGHT: 62 IN | HEART RATE: 67 BPM | BODY MASS INDEX: 21.86 KG/M2 | RESPIRATION RATE: 16 BRPM | OXYGEN SATURATION: 98 %

## 2020-01-02 PROCEDURE — 99213 OFFICE O/P EST LOW 20 MIN: CPT | Performed by: NURSE PRACTITIONER

## 2020-01-02 RX ORDER — SULFACETAMIDE SODIUM 100 MG/ML
1 SOLUTION/ DROPS OPHTHALMIC EVERY 6 HOURS
Qty: 5 ML | Refills: 0 | Status: SHIPPED | OUTPATIENT
Start: 2020-01-02 | End: 2020-01-08

## 2020-01-02 ASSESSMENT — PATIENT HEALTH QUESTIONNAIRE - PHQ9
2. FEELING DOWN, DEPRESSED OR HOPELESS: 0
1. LITTLE INTEREST OR PLEASURE IN DOING THINGS: 0
SUM OF ALL RESPONSES TO PHQ QUESTIONS 1-9: 0
SUM OF ALL RESPONSES TO PHQ9 QUESTIONS 1 & 2: 0
SUM OF ALL RESPONSES TO PHQ QUESTIONS 1-9: 0

## 2020-01-03 NOTE — PROGRESS NOTES
ONE TABLET BY MOUTH DAILY 90 tablet 1    midodrine (PROAMATINE) 5 MG tablet TAKE ONE TABLET BY MOUTH THREE TIMES A DAY FOR BLOOD PRESSURE 270 tablet 1    diphenhydrAMINE (BENADRYL) 25 MG tablet Take 25 mg by mouth nightly as needed for Itching      vitamin D (CHOLECALCIFEROL) 1000 UNIT TABS tablet Take 2 tablets by mouth daily. gummies      Multiple Vitamins-Minerals (MULTIVITAMIN PO) Take 1 tablet by mouth daily.  BLACK COHOSH ROOT Take 600 mg by mouth 2 times daily as needed. FOR HOT FLASHES      nicotine (NICODERM CQ) 21 MG/24HR Place 1 patch onto the skin every 24 hours (Patient not taking: Reported on 1/2/2020) 45 patch 0    nicotine (NICODERM CQ) 14 MG/24HR Place 1 patch onto the skin every 24 hours for 15 days 15 patch 0    nicotine (NICODERM CQ) 7 MG/24HR Place 1 patch onto the skin every 24 hours for 15 days 15 patch 0     No current facility-administered medications for this visit. Past Medical History:   Diagnosis Date    Atrophic vaginitis 3/10/2016    Chronic fatigue     Constipation     Endometriosis     h/o endometriosis for which she had a hysterectomy    Genital herpes     Hyperlipidemia     Migraine     migraine cephalgia    Neurocardiogenic syncope     Ovarian cyst     Pelvic inflammatory disease (PID)     Sinusitis, chronic     chronic sinusitis probably related to tobacco abuse    Tobacco abuse     Vitamin D deficiency        Social History     Tobacco Use    Smoking status: Current Every Day Smoker     Packs/day: 1.00     Years: 15.00     Pack years: 15.00     Types: Cigarettes    Smokeless tobacco: Never Used    Tobacco comment: Will see PCP PRN when ready to quit. Substance Use Topics    Alcohol use:  Yes     Alcohol/week: 0.0 standard drinks     Comment: RARE    Drug use: No     Types: Cocaine     Comment: HAS BEEN OFF CRACK COCAINE FOR SEVERAL YEARS       Significant family and surgical history reviewed as noted in the patient's record. Objective:    Physical Exam:  Vitals: /66 (Site: Right Upper Arm, Position: Sitting, Cuff Size: Medium Adult)   Pulse 67   Temp 97.4 °F (36.3 °C) (Tympanic)   Resp 16   Ht 5' 1.5\" (1.562 m)   Wt 118 lb 12.8 oz (53.9 kg)   LMP 08/01/2006   SpO2 98%   BMI 22.08 kg/m²     LABS:  CBC:  No results for input(s): WBC, HGB, PLT in the last 72 hours. BMP:  No results for input(s): NA, K, CL, CO2, BUN, CREATININE, GLUCOSE in the last 72 hours. Hepatic:  No results for input(s): AST, ALT, ALB, BILITOT, ALKPHOS in the last 72 hours. Pertinent lab and radiology results reviewed. General Appearance: well developed, well nourished, and in no acute distress  Skin: warm and dry, no rash, no erythema  Head: normocephalic and atraumatic  Eyes: pupils equal, round, and reactive to light, sclerae white, conjunctivae normal, eomi  Tetracaine was instilled into the right eye. The upper and lower lids were everted revealing no foreign body. Fluorescein was instilled into the right eye. There was an area of increased uptake on the latearl cornea lateral to the iris.   ENT: left tympanic membrane normal, right tympanic membrane normal, left external ear normal, right external ear normal, left ear canal normal, right ear canal normal, nose without deformity, nasal mucosa and turbinates normal, pharynx normal, sinuses non-tender  Neck: supple and non-tender without mass, no thyromegaly or thyroid nodules, no cervical lymphadenopathy  Pulmonary/Chest: clear to auscultation bilaterally- no wheezes, rales or rhonchi, normal air movement, no respiratory distress  Cardiovascular: normal rate, regular rhythm, normal S1 and S2, no audible murmurs, rubs, or clicks, distal pulses intact  Abdomen: soft, non-tender, non-distended, normal bowel sounds, no masses or organomegaly  Extremities: no cyanosis, no clubbing, no edema  Musculoskeletal: normal range of motion, no joint swelling, no obvious bony deformity, no tenderness  Neurologic: no acute gross cranial nerve deficit, gait normal, and speech normal  Psychologic: oriented to person, place, and time, blunt mood, flat affect      Assessment and Plan:     Diagnosis Orders   1. Abrasion of right cornea, initial encounter  sulfacetamide (BLEPH-10) 10 % ophthalmic solution     Orders Placed This Encounter   Medications    sulfacetamide (BLEPH-10) 10 % ophthalmic solution     Sig: Place 1 drop into the right eye every 6 hours for 6 days     Dispense:  5 mL     Refill:  0       No contacts until prescription completed and symptoms resolved  If not improving, follow up with your ophthalmologist  Education provided. Follow up with PCP, sooner as needed. Return or go to an urgent care or emergency room if symptoms worsen, fail to improve, or new symptoms arise. The use, risks, benefits, and side effects of prescribed or recommended medications were discussed. All questions were answered and the patient/caregiver voiced understanding.        Electronically signed by IGNACIO Alvarez, FNP-BC on 1/2/2020 at 8:36 PM  Internal Medicine

## 2020-02-14 ENCOUNTER — OFFICE VISIT (OUTPATIENT)
Dept: PRIMARY CARE CLINIC | Age: 40
End: 2020-02-14
Payer: COMMERCIAL

## 2020-02-14 VITALS
TEMPERATURE: 97.3 F | OXYGEN SATURATION: 98 % | SYSTOLIC BLOOD PRESSURE: 130 MMHG | DIASTOLIC BLOOD PRESSURE: 80 MMHG | WEIGHT: 124.2 LBS | BODY MASS INDEX: 22.86 KG/M2 | RESPIRATION RATE: 16 BRPM | HEIGHT: 62 IN | HEART RATE: 88 BPM

## 2020-02-14 PROCEDURE — 99213 OFFICE O/P EST LOW 20 MIN: CPT | Performed by: FAMILY MEDICINE

## 2020-02-14 PROCEDURE — 96372 THER/PROPH/DIAG INJ SC/IM: CPT | Performed by: FAMILY MEDICINE

## 2020-02-14 RX ORDER — KETOROLAC TROMETHAMINE 30 MG/ML
60 INJECTION, SOLUTION INTRAMUSCULAR; INTRAVENOUS ONCE
Status: COMPLETED | OUTPATIENT
Start: 2020-02-14 | End: 2020-02-14

## 2020-02-14 RX ORDER — PROMETHAZINE HYDROCHLORIDE 25 MG/ML
25 INJECTION, SOLUTION INTRAMUSCULAR; INTRAVENOUS ONCE
Status: COMPLETED | OUTPATIENT
Start: 2020-02-14 | End: 2020-02-14

## 2020-02-14 RX ADMIN — KETOROLAC TROMETHAMINE 60 MG: 30 INJECTION, SOLUTION INTRAMUSCULAR; INTRAVENOUS at 21:18

## 2020-02-14 RX ADMIN — PROMETHAZINE HYDROCHLORIDE 25 MG: 25 INJECTION, SOLUTION INTRAMUSCULAR; INTRAVENOUS at 21:19

## 2020-02-14 ASSESSMENT — ENCOUNTER SYMPTOMS
PHOTOPHOBIA: 1
CHEST TIGHTNESS: 0
WHEEZING: 0
BLURRED VISION: 0
VOMITING: 1
NAUSEA: 1
SHORTNESS OF BREATH: 0
COUGH: 0

## 2020-02-15 NOTE — PROGRESS NOTES
[address] [place] [address]  Dept: [phone]  Dept Fax: [phone]  Loc: [phone]    Sivan Morales is a 37 y.o. female who presents today for her medical conditions/complaints as noted below. Sivan Morales is c/o of   Chief Complaint   Patient presents with    Migraine     Migraine with positive Hx x1 day. HPI:     Here today for a migraine. Migraine    This is a recurrent problem. The current episode started today. The problem occurs constantly. The problem has been unchanged. The pain is located in the frontal and occipital region. The pain does not radiate. The pain quality is similar to prior headaches. The quality of the pain is described as throbbing, squeezing, boring and sharp. The pain is at a severity of 9/10. The pain is severe. Associated symptoms include nausea, phonophobia, photophobia, tinnitus and vomiting. Pertinent negatives include no blurred vision, coughing, dizziness, fever, loss of balance, numbness, tingling or weakness. She has tried Excedrin for the symptoms. The treatment provided mild relief. Her past medical history is significant for migraine headaches.          Past Medical History:   Diagnosis Date    Atrophic vaginitis 3/10/2016    Chronic fatigue     Constipation     Endometriosis     h/o endometriosis for which she had a hysterectomy    Genital herpes     Hyperlipidemia     Migraine     migraine cephalgia    Neurocardiogenic syncope     Ovarian cyst     Pelvic inflammatory disease (PID)     Sinusitis, chronic     chronic sinusitis probably related to tobacco abuse    Tobacco abuse     Vitamin D deficiency           Social History     Tobacco Use    Smoking status: Current Every Day Smoker     Packs/day: 1.00     Years: 15.00     Pack years: 15.00     Types: Cigarettes    Smokeless tobacco: Never Used    Tobacco comment: Will see PCP PRN when ready to quit. Substance Use Topics    Alcohol use: Yes     Alcohol/week: 0.0 standard drinks     Comment: RARE     Current Outpatient Medications   Medication Sig Dispense Refill    metoprolol tartrate (LOPRESSOR) 25 MG tablet TAKE ONE TABLET BY MOUTH TWICE A  tablet 0    oxybutynin (DITROPAN XL) 10 MG extended release tablet Take 1 tablet by mouth daily 90 tablet 3    citalopram (CELEXA) 40 MG tablet Take 1 tablet by mouth daily 90 tablet 1    traZODone (DESYREL) 50 MG tablet Take 1 tablet by mouth nightly as needed for Sleep 90 tablet 1    simvastatin (ZOCOR) 20 MG tablet Take 1 tablet by mouth nightly 90 tablet 1    montelukast (SINGULAIR) 10 MG tablet Take 1 tablet by mouth nightly 90 tablet 1    fludrocortisone (FLORINEF) 0.1 MG tablet TAKE ONE TABLET BY MOUTH DAILY 90 tablet 1    midodrine (PROAMATINE) 5 MG tablet TAKE ONE TABLET BY MOUTH THREE TIMES A DAY FOR BLOOD PRESSURE 270 tablet 1    diphenhydrAMINE (BENADRYL) 25 MG tablet Take 25 mg by mouth nightly as needed for Itching      vitamin D (CHOLECALCIFEROL) 1000 UNIT TABS tablet Take 2 tablets by mouth daily. gummies      Multiple Vitamins-Minerals (MULTIVITAMIN PO) Take 1 tablet by mouth daily.  BLACK COHOSH ROOT Take 600 mg by mouth 2 times daily as needed.  FOR HOT FLASHES      nicotine (NICODERM CQ) 21 MG/24HR Place 1 patch onto the skin every 24 hours (Patient not taking: Reported on 1/2/2020) 45 patch 0    nicotine (NICODERM CQ) 14 MG/24HR Place 1 patch onto the skin every 24 hours for 15 days 15 patch 0    nicotine (NICODERM CQ) 7 MG/24HR Place 1 patch onto the skin every 24 hours for 15 days 15 patch 0     Current Facility-Administered Medications   Medication Dose Route Frequency Provider Last Rate Last Dose    ketorolac (TORADOL) injection 60 mg  60 mg Intramuscular Once Ricardo William MD        [facility]) injection 25 mg  25 mg Intramuscular Once Ricardo William MD Allergies   Allergen Reactions    Amoxicillin Nausea Only     GI UPSET    Other      seaosanal allergies      Pcn [Penicillins] Nausea Only     GI UPSET       Subjective:     Review of Systems   Constitutional: Negative for activity change, appetite change, chills, fatigue and fever. HENT: Positive for tinnitus. Eyes: Positive for photophobia. Negative for blurred vision and visual disturbance. Respiratory: Negative for cough, chest tightness, shortness of breath and wheezing. Cardiovascular: Negative for chest pain, palpitations and leg swelling. Gastrointestinal: Positive for nausea and vomiting. Genitourinary: Negative for difficulty urinating. Neurological: Positive for headaches. Negative for dizziness, tingling, syncope, weakness, light-headedness, numbness and loss of balance. Objective:      Physical Exam  Vitals signs and nursing note reviewed. Constitutional:       General: She is not in acute distress. Appearance: She is well-developed. Eyes:      Conjunctiva/sclera: Conjunctivae normal.      Pupils: Pupils are equal, round, and reactive to light. Neck:      Musculoskeletal: Normal range of motion and neck supple. Thyroid: No thyromegaly. Cardiovascular:      Rate and Rhythm: Normal rate and regular rhythm. Heart sounds: Normal heart sounds. No murmur. Pulmonary:      Effort: Pulmonary effort is normal. No respiratory distress. Breath sounds: Normal breath sounds. No wheezing. Lymphadenopathy:      Cervical: No cervical adenopathy. Skin:     General: Skin is warm and dry. Findings: No erythema or rash. Neurological:      Mental Status: She is alert and oriented to person, place, and time. Cranial Nerves: Cranial nerves are intact. Sensory: Sensation is intact. Motor: Motor function is intact. Coordination: Coordination is intact. Gait: Gait is intact.       Deep Tendon Reflexes:      Reflex Scores:       Bicep

## 2020-02-19 ENCOUNTER — HOSPITAL ENCOUNTER (OUTPATIENT)
Dept: LAB | Age: 40
Discharge: HOME | End: 2020-02-19
Payer: COMMERCIAL

## 2020-02-19 ENCOUNTER — OFFICE VISIT (OUTPATIENT)
Dept: FAMILY MEDICINE CLINIC | Age: 40
End: 2020-02-19
Payer: COMMERCIAL

## 2020-02-19 VITALS
HEIGHT: 62 IN | HEART RATE: 76 BPM | WEIGHT: 124 LBS | SYSTOLIC BLOOD PRESSURE: 108 MMHG | BODY MASS INDEX: 22.82 KG/M2 | DIASTOLIC BLOOD PRESSURE: 74 MMHG

## 2020-02-19 LAB
ALBUMIN SERPL-MCNC: 5.1 G/DL (ref 3.5–5.2)
ALBUMIN/GLOBULIN RATIO: 1.8 (ref 1–2.5)
ALP BLD-CCNC: 100 U/L (ref 35–104)
ALT SERPL-CCNC: 20 U/L (ref 5–33)
ANION GAP SERPL CALCULATED.3IONS-SCNC: 15 MMOL/L (ref 9–17)
AST SERPL-CCNC: 31 U/L
BILIRUB SERPL-MCNC: 0.14 MG/DL (ref 0.3–1.2)
BUN BLDV-MCNC: 11 MG/DL (ref 6–20)
BUN/CREAT BLD: 14 (ref 9–20)
CALCIUM SERPL-MCNC: 9.6 MG/DL (ref 8.6–10.4)
CHLORIDE BLD-SCNC: 100 MMOL/L (ref 98–107)
CHOLESTEROL/HDL RATIO: 3.9
CHOLESTEROL: 187 MG/DL
CO2: 26 MMOL/L (ref 20–31)
CREAT SERPL-MCNC: 0.76 MG/DL (ref 0.5–0.9)
ESTIMATED AVERAGE GLUCOSE: 105 MG/DL
GFR AFRICAN AMERICAN: >60 ML/MIN
GFR NON-AFRICAN AMERICAN: >60 ML/MIN
GFR SERPL CREATININE-BSD FRML MDRD: ABNORMAL ML/MIN/{1.73_M2}
GFR SERPL CREATININE-BSD FRML MDRD: ABNORMAL ML/MIN/{1.73_M2}
GLUCOSE BLD-MCNC: 94 MG/DL (ref 70–99)
HBA1C MFR BLD: 5.3 % (ref 4.8–5.9)
HDLC SERPL-MCNC: 48 MG/DL
LDL CHOLESTEROL: 120 MG/DL (ref 0–130)
POTASSIUM SERPL-SCNC: 4 MMOL/L (ref 3.7–5.3)
SODIUM BLD-SCNC: 141 MMOL/L (ref 135–144)
TOTAL PROTEIN: 8 G/DL (ref 6.4–8.3)
TRIGL SERPL-MCNC: 96 MG/DL
TSH SERPL DL<=0.05 MIU/L-ACNC: 2.76 MIU/L (ref 0.3–5)
VLDLC SERPL CALC-MCNC: NORMAL MG/DL (ref 1–30)

## 2020-02-19 PROCEDURE — 99214 OFFICE O/P EST MOD 30 MIN: CPT | Performed by: PHYSICIAN ASSISTANT

## 2020-02-19 PROCEDURE — 84443 ASSAY THYROID STIM HORMONE: CPT

## 2020-02-19 PROCEDURE — 83036 HEMOGLOBIN GLYCOSYLATED A1C: CPT

## 2020-02-19 PROCEDURE — 36415 COLL VENOUS BLD VENIPUNCTURE: CPT

## 2020-02-19 PROCEDURE — 80061 LIPID PANEL: CPT

## 2020-02-19 PROCEDURE — 80053 COMPREHEN METABOLIC PANEL: CPT

## 2020-02-19 RX ORDER — VARENICLINE TARTRATE 0.5 MG/1
TABLET, FILM COATED ORAL
Qty: 57 TABLET | Refills: 0 | Status: SHIPPED | OUTPATIENT
Start: 2020-02-19 | End: 2020-08-19

## 2020-02-19 RX ORDER — VARENICLINE TARTRATE 1 MG/1
1 TABLET, FILM COATED ORAL 2 TIMES DAILY
Qty: 60 TABLET | Refills: 5 | Status: SHIPPED | OUTPATIENT
Start: 2020-02-19 | End: 2020-08-19 | Stop reason: SDUPTHER

## 2020-02-19 ASSESSMENT — PATIENT HEALTH QUESTIONNAIRE - PHQ9
SUM OF ALL RESPONSES TO PHQ QUESTIONS 1-9: 2
SUM OF ALL RESPONSES TO PHQ9 QUESTIONS 1 & 2: 2
1. LITTLE INTEREST OR PLEASURE IN DOING THINGS: 1
SUM OF ALL RESPONSES TO PHQ QUESTIONS 1-9: 2
2. FEELING DOWN, DEPRESSED OR HOPELESS: 1

## 2020-02-19 ASSESSMENT — ENCOUNTER SYMPTOMS
EYES NEGATIVE: 1
RESPIRATORY NEGATIVE: 1
CONSTIPATION: 0

## 2020-02-19 NOTE — PROGRESS NOTES
Subjective:      Patient ID: Cordell Knapp is a 37 y.o. female. Hyperlipidemia   This is a chronic problem. The current episode started more than 1 year ago. Factors aggravating her hyperlipidemia include smoking. Current antihyperlipidemic treatment includes statins. There are no compliance problems. Risk factors for coronary artery disease include dyslipidemia. Mental Health Problem   The primary symptoms do not include dysphoric mood. Primary symptoms comment: anxiety with depression. The current episode started more than 1 month ago. This is a chronic problem. The onset of the illness is precipitated by emotional stress. Weight gain  Reports weight gain over last 6 months, is happy with current weight but was concerned about how fast she put it on. Review of Systems   Constitutional: Negative. HENT: Negative. Eyes: Negative. Respiratory: Negative. Cardiovascular: Negative. Gastrointestinal: Negative for constipation. Genitourinary: Negative. Musculoskeletal: Negative. Psychiatric/Behavioral: Negative for dysphoric mood. The patient is not nervous/anxious.       Past Medical History:   Diagnosis Date    Atrophic vaginitis 3/10/2016    Chronic fatigue     Constipation     Endometriosis     h/o endometriosis for which she had a hysterectomy    Genital herpes     Hyperlipidemia     Migraine     migraine cephalgia    Neurocardiogenic syncope     Ovarian cyst     Pelvic inflammatory disease (PID)     Sinusitis, chronic     chronic sinusitis probably related to tobacco abuse    Tobacco abuse     Vitamin D deficiency      Past Surgical History:   Procedure Laterality Date    APPENDECTOMY      LAPAROSCOPY      X3 before the hysterectomy    OTHER SURGICAL HISTORY  2016    lump removed lower right leg    KRISTIN AND BSO  2006    total with bilateral salpingo-oophorectomy    WISDOM TOOTH EXTRACTION       Social History     Socioeconomic History    Marital status:      Spouse name: Not on file    Number of children: Not on file    Years of education: Not on file    Highest education level: Not on file   Occupational History    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 Substance and Sexual Activity    Alcohol use: Yes     Alcohol/week: 0.0 standard drinks     Comment: RARE    Drug use: No     Types: Cocaine     Comment: HAS BEEN OFF CRACK COCAINE FOR SEVERAL YEARS    Sexual activity: Yes     Partners: Male     Birth control/protection: Surgical     Comment:    Lifestyle    Physical activity:     Days per week: Not on file     Minutes per session: Not on file    Stress: Not on file   Relationships    Social connections:     Talks on phone: Not on file     Gets together: Not on file     Attends Hinduism service: Not on file     Active member of club or organization: Not on file     Attends meetings of clubs or organizations: Not on file     Relationship status: Not on file    Intimate partner violence:     Fear of current or ex partner: Not on file     Emotionally abused: Not on file     Physically abused: Not on file     Forced sexual activity: Not on file   Other Topics Concern    Not on file   Social History Narrative    Not on file       Objective:   Physical Exam  Vitals signs reviewed. Constitutional:       Appearance: Normal appearance. HENT:      Head: Normocephalic and atraumatic.       Right Ear: Tympanic membrane normal.      Left Ear: Tympanic membrane normal.      Nose: Nose normal. Mouth/Throat:      Mouth: Mucous membranes are moist.   Eyes:      Pupils: Pupils are equal, round, and reactive to light. Cardiovascular:      Rate and Rhythm: Normal rate and regular rhythm. Pulses: Normal pulses. Heart sounds: Normal heart sounds. Pulmonary:      Effort: Pulmonary effort is normal.      Breath sounds: Normal breath sounds. Abdominal:      General: Abdomen is flat. Bowel sounds are normal.      Palpations: Abdomen is soft. Skin:     General: Skin is warm and dry. Capillary Refill: Capillary refill takes less than 2 seconds. Neurological:      Mental Status: She is alert and oriented to person, place, and time. Psychiatric:         Mood and Affect: Mood normal.         Assessment:      1. Anxiety with depression    2. Chronic migraine without aura without status migrainosus, not intractable    3. Tobacco abuse    4. Mixed hyperlipidemia    5. Screening, lipid    6. Screening for thyroid disorder    7. Screening for diabetes mellitus            Plan:      Follow up in 6 months, sooner if needed. Labs TSH w/reflex, Lipid, CMP. RX Chantix. Emotional support re wt gain, educated on medication side effects. Encouraged smoking cessation which patient agrees to.         [place]

## 2020-02-27 RX ORDER — MIDODRINE HYDROCHLORIDE 5 MG/1
TABLET ORAL
Qty: 270 TABLET | Refills: 1 | Status: SHIPPED | OUTPATIENT
Start: 2020-02-27 | End: 2020-08-19 | Stop reason: SDUPTHER

## 2020-03-09 RX ORDER — FLUDROCORTISONE ACETATE 0.1 MG/1
TABLET ORAL
Qty: 90 TABLET | Refills: 1 | Status: SHIPPED | OUTPATIENT
Start: 2020-03-09 | End: 2020-08-19 | Stop reason: SDUPTHER

## 2020-03-30 RX ORDER — SIMVASTATIN 20 MG
TABLET ORAL
Qty: 90 TABLET | Refills: 0 | Status: SHIPPED | OUTPATIENT
Start: 2020-03-30 | End: 2020-08-14

## 2020-05-03 ENCOUNTER — OFFICE VISIT (OUTPATIENT)
Dept: PRIMARY CARE CLINIC | Age: 40
End: 2020-05-03
Payer: COMMERCIAL

## 2020-05-03 VITALS
DIASTOLIC BLOOD PRESSURE: 60 MMHG | OXYGEN SATURATION: 100 % | RESPIRATION RATE: 18 BRPM | BODY MASS INDEX: 24.17 KG/M2 | WEIGHT: 130 LBS | HEART RATE: 60 BPM | SYSTOLIC BLOOD PRESSURE: 110 MMHG | TEMPERATURE: 96.4 F

## 2020-05-03 PROCEDURE — 99213 OFFICE O/P EST LOW 20 MIN: CPT | Performed by: FAMILY MEDICINE

## 2020-05-03 RX ORDER — CLINDAMYCIN HYDROCHLORIDE 300 MG/1
300 CAPSULE ORAL 4 TIMES DAILY
Qty: 40 CAPSULE | Refills: 0 | Status: SHIPPED | OUTPATIENT
Start: 2020-05-03 | End: 2020-05-13

## 2020-05-03 RX ORDER — HYDROCODONE BITARTRATE AND ACETAMINOPHEN 5; 325 MG/1; MG/1
1 TABLET ORAL EVERY 4 HOURS PRN
Qty: 18 TABLET | Refills: 0 | Status: SHIPPED | OUTPATIENT
Start: 2020-05-03 | End: 2020-05-06

## 2020-05-03 NOTE — PROGRESS NOTES
1000 UNIT TABS tablet Take 2 tablets by mouth daily. gummies      Multiple Vitamins-Minerals (MULTIVITAMIN PO) Take 1 tablet by mouth daily.  BLACK COHOSH ROOT Take 600 mg by mouth 2 times daily as needed. FOR HOT FLASHES      nicotine (NICODERM CQ) 21 MG/24HR Place 1 patch onto the skin every 24 hours (Patient not taking: Reported on 1/2/2020) 45 patch 0    nicotine (NICODERM CQ) 14 MG/24HR Place 1 patch onto the skin every 24 hours for 15 days 15 patch 0    nicotine (NICODERM CQ) 7 MG/24HR Place 1 patch onto the skin every 24 hours for 15 days 15 patch 0     No current facility-administered medications for this visit. She is allergic to amoxicillin; other; and pcn [penicillins]. She has the following problem list:  Patient Active Problem List   Diagnosis    Vitamin D deficiency    Sinusitis, chronic    Migraine    Constipation    Genital herpes    Neurocardiogenic syncope    Chronic fatigue    Tobacco abuse    Atrophic vaginitis    Mixed hyperlipidemia    Lower urinary tract symptoms        She  reports that she has been smoking cigarettes. She has a 15.00 pack-year smoking history. She has never used smokeless tobacco.      Objective:    Vitals:    05/03/20 1656   BP: 110/60   Pulse: 60   Resp: 18   Temp: 96.4 °F (35.8 °C)   TempSrc: Tympanic   SpO2: 100%   Weight: 130 lb (59 kg)      SpO2: 100 %       Body mass index is 24.17 kg/m². Well-nourished, well-developed  female healthy-appearing, alert and cooperative. The tympanic membranes are clear bilaterally. Oropharynx has no erythema. There is no exudate. The dentition is in poor repair. There is no tenderness over the frontal and maxillary sinuses bilaterally. Neck supple. No adenopathy. Assessment & Plan:    Pain, dental  - HYDROcodone-acetaminophen (NORCO) 5-325 MG per tablet; Take 1 tablet by mouth every 4 hours as needed for Pain for up to 3 days. Intended supply: 3 days.  Take lowest dose possible to

## 2020-05-03 NOTE — PATIENT INSTRUCTIONS
Patient Education        Tooth and Gum Pain: Care Instructions  Your Care Instructions    The most common causes of dental pain are tooth decay and gum disease. Pain can also be caused by an infection of the tooth (abscess) or the gums. Or you may have pain from a broken or cracked tooth. Other causes of pain include infection and damage to a tooth from nervous grinding of your teeth. A wisdom tooth can be painful when it is coming in but cannot break through the gum. It can also be painful when the tooth is only partway in and extra gum tissue has formed around it. The tissue can get inflamed (pericoronitis), and sometimes it gets infected. Prompt dental care can help find the cause of your toothache and keep the tooth from dying or gum disease from getting worse. Self-care at home may reduce your pain and discomfort. Follow-up care is a key part of your treatment and safety. Be sure to make and go to all appointments, and call your dentist or doctor if you are having problems. It's also a good idea to know your test results and keep a list of the medicines you take. How can you care for yourself at home? · To reduce pain and facial swelling, put an ice or cold pack on the outside of your cheek for 10 to 20 minutes at a time. Put a thin cloth between the ice and your skin. Do not use heat. · If your doctor prescribed antibiotics, take them as directed. Do not stop taking them just because you feel better. You need to take the full course of antibiotics. · Ask your doctor if you can take an over-the-counter pain medicine, such as acetaminophen (Tylenol), ibuprofen (Advil, Motrin), or naproxen (Aleve). Be safe with medicines. Read and follow all instructions on the label. · Avoid very hot, cold, or sweet foods and drinks if they increase your pain. · Rinse your mouth with warm salt water every 2 hours to help relieve pain and swelling. Mix 1 teaspoon of salt in 8 ounces of water.   · Talk to your dentist narcotic pain medication. Do not use in combination with alcohol.

## 2020-05-20 RX ORDER — MONTELUKAST SODIUM 10 MG/1
TABLET ORAL
Qty: 90 TABLET | Refills: 0 | Status: SHIPPED | OUTPATIENT
Start: 2020-05-20 | End: 2020-08-19 | Stop reason: SDUPTHER

## 2020-07-02 RX ORDER — CITALOPRAM 40 MG/1
40 TABLET ORAL DAILY
Qty: 90 TABLET | Refills: 0 | Status: SHIPPED | OUTPATIENT
Start: 2020-07-02 | End: 2020-08-19 | Stop reason: SDUPTHER

## 2020-07-09 ENCOUNTER — OFFICE VISIT (OUTPATIENT)
Dept: PODIATRY | Age: 40
End: 2020-07-09
Payer: COMMERCIAL

## 2020-07-09 VITALS
HEART RATE: 72 BPM | SYSTOLIC BLOOD PRESSURE: 118 MMHG | RESPIRATION RATE: 20 BRPM | BODY MASS INDEX: 25.32 KG/M2 | DIASTOLIC BLOOD PRESSURE: 64 MMHG | WEIGHT: 136.2 LBS

## 2020-07-09 PROCEDURE — 99203 OFFICE O/P NEW LOW 30 MIN: CPT | Performed by: PODIATRIST

## 2020-07-09 NOTE — PROGRESS NOTES
Subjective:  Flaco Smith is a 41 y.o. female who presents to the office today complaining of pain both big toes. Symptoms began month(s) ago. Patient relates pain is Present. Pain is rated 5 out of 10 and is described as waxing and waning, moderate. Treatments prior to today's visit include: cutting nail back. Pt also has a lot of soreness in her feet at end of a work day. No tx tried. Present for some time. .  Currently denies F/C/N/V. Allergies   Allergen Reactions    Amoxicillin Nausea Only     GI UPSET    Other      seaosanal allergies      Pcn [Penicillins] Nausea Only     GI UPSET       Past Medical History:   Diagnosis Date    Atrophic vaginitis 3/10/2016    Chronic fatigue     Constipation     Endometriosis     h/o endometriosis for which she had a hysterectomy    Genital herpes     Hyperlipidemia     Migraine     migraine cephalgia    Neurocardiogenic syncope     Ovarian cyst     Pelvic inflammatory disease (PID)     Sinusitis, chronic     chronic sinusitis probably related to tobacco abuse    Tobacco abuse     Vitamin D deficiency        Prior to Admission medications    Medication Sig Start Date End Date Taking?  Authorizing Provider   citalopram (CELEXA) 40 MG tablet Take 1 tablet by mouth daily 7/2/20  Yes PIERO Nunez   montelukast (SINGULAIR) 10 MG tablet TAKE ONE TABLET BY MOUTH EVERY EVENING 5/20/20  Yes Loren Nunez   metoprolol tartrate (LOPRESSOR) 25 MG tablet TAKE ONE TABLET BY MOUTH TWICE A DAY 5/20/20  Yes PIERO Nunez   simvastatin (ZOCOR) 20 MG tablet TAKE ONE TABLET BY MOUTH EVERY NIGHT 3/30/20  Yes Loren Nunez   fludrocortisone (FLORINEF) 0.1 MG tablet TAKE ONE TABLET BY MOUTH DAILY 3/9/20  Yes PIERO Nunez   midodrine (PROAMATINE) 5 MG tablet TAKE ONE TABLET BY MOUTH THREE TIMES A DAY FOR BLOOD PRESSURE 2/27/20  Yes PIERO Nunez   varenicline (CHANTIX) 0.5 MG tablet 0.5mg DAILY for 3 days followed by 0.5mg TWICE DAILY for 4 days followed by 1mg TWICE DAILY 2/19/20  Yes Talita Bolton   varenicline (CHANTIX) 1 MG tablet Take 1 tablet by mouth 2 times daily 2/19/20  Yes Talita Bolton   oxybutynin (DITROPAN XL) 10 MG extended release tablet Take 1 tablet by mouth daily 12/2/19  Yes Sosa Boykin MD   traZODone (DESYREL) 50 MG tablet Take 1 tablet by mouth nightly as needed for Sleep 11/19/19  Yes PIERO Bolton   diphenhydrAMINE (BENADRYL) 25 MG tablet Take 25 mg by mouth nightly as needed for Itching   Yes Historical Provider, MD   vitamin D (CHOLECALCIFEROL) 1000 UNIT TABS tablet Take 2 tablets by mouth daily. gummies   Yes Historical Provider, MD   Multiple Vitamins-Minerals (MULTIVITAMIN PO) Take 1 tablet by mouth daily. Yes Historical Provider, MD   BLACK COHOSH ROOT Take 600 mg by mouth 2 times daily as needed.  FOR HOT FLASHES   Yes Historical Provider, MD   nicotine (NICODERM CQ) 21 MG/24HR Place 1 patch onto the skin every 24 hours  Patient not taking: Reported on 1/2/2020 11/19/19 1/3/20  PIERO Bolton   nicotine (Ranelle Quirk) 14 MG/24HR Place 1 patch onto the skin every 24 hours for 15 days 11/19/19 12/4/19  PIERO Bolton   nicotine (Ranelle Quirk) 7 MG/24HR Place 1 patch onto the skin every 24 hours for 15 days 11/19/19 12/4/19  PIERO Bolton       Past Surgical History:   Procedure Laterality Date    APPENDECTOMY      LAPAROSCOPY      X3 before the hysterectomy    OTHER SURGICAL HISTORY  2016    lump removed lower right leg    KRISTIN AND BSO  2006    total with bilateral salpingo-oophorectomy    WISDOM TOOTH EXTRACTION         Family History   Problem Relation Age of Onset    Heart Disease Mother         heart murmur/valvular heart disease    Other Mother         family h/o diabetes    Migraines Mother         runs in females on her side of family   [facility] Stroke Mother 62    Hypertension Mother    [facility] Parkinsonism Mother     Depression Mother     Other Father         family h/o diabetes    Diabetes Maternal Grandmother         diabetes    Diabetes Maternal Grandfather         diabetes    Cancer Paternal Grandfather 76        Acute Leukemia and infection/sudden death    Diabetes Maternal Uncle         Type 2       Social History     Tobacco Use    Smoking status: Current Every Day Smoker     Packs/day: 1.00     Years: 15.00     Pack years: 15.00     Types: Cigarettes    Smokeless tobacco: Never Used    Tobacco comment: Will see PCP PRN when ready to quit. Substance Use Topics    Alcohol use: Yes     Alcohol/week: 0.0 standard drinks     Comment: RARE       ROS: All 14 ROS systems reviewed and pertinent positives noted above, all others negative. Lower Extremity Physical Examination:     Vitals:   Vitals:    07/09/20 0854   BP: 118/64   Pulse: 72   Resp: 20     General: AAO x 3 in NAD. Vascular: DP and PT pulses palpable 2/4, bilateral.  CFT <3 seconds, bilateral.  Hair growth present to the level of the digits, bilateral.  Edema absent, bilateral.  Varicosities absent, bilateral. Erythema absent, bilateral. Distal Rubor absent bilateral.  Temperature within normal limits bilateral. Hyperpigmentation absent bilateral. No atrophic skin. Neurological: Sensation intact to light touch to level of digits, bilateral.  Protective sensation intact 10/10 sites via 5.07/10g [place] Monofilament, bilateral.  negative Tinel's, bilateral.  negative Valleix sign, bilateral.  Vibratory intact bilateral.  Reflexes Decreased bilateral.  Paresthesias negative. Dysthesias negative. Sharp/dull intact bilateral.    Musculoskeletal: Muscle strength 5/5, bilateral.  Pain present upon palpation bilateral distal medial hallux . Normal medial longitudinal arch, bilateral.  Ankle ROM within normal limits,bilateral.  1st MPJ ROM functional decrease, bilateral.  Dorsally contracted digits absent. No other foot deformities.       Integument: Warm, dry, supple, bilateral.  Open lesion absent, bilateral. Interdigital maceration absent to web spaces bilateral. Onychocryptosis medial hallux b/l. Nails within normal limits. Fissures absent, bilateral. Hyperkeratotic tissue is absent. Asessment: Patient is a 41 y.o. female with:    Diagnosis Orders   1. Hallux limitus, acquired, unspecified laterality     2. OC (onychocryptosis)     3. Pain in toes of both feet         Plan:   Patient examined and evaluated. Current condition and treatment options discussed in detail. No current need for Slant back nail cut took place of b/l medial hallux nail. No need for nail procedure currently, any increase in pain of signs of infection then patient will be seen back . Pt would like to set up nail procedure near future after her vacation. .  Patient will soak qd in warm soapy water or epsom salts and will use OTC antibiotic ointment daily. Due to level of pain/deformity, it is in my medical opinion that patient will benefit from and is medically necessary for pre rosalind orthotics at this time. Pt was given the option of pre fabricated insoles. Pt was advised on appropriate use and how they can help their condition. Pt should use these in shoe gear 100% of the time WB. Offloading pads to distal hallux to decrease irritation at toe off  Pt to consider max cushioning shoes. Contact office with any questions/problems/concerns. RTC in 2 week(s).

## 2020-08-14 RX ORDER — SIMVASTATIN 20 MG
TABLET ORAL
Qty: 90 TABLET | Refills: 0 | Status: SHIPPED | OUTPATIENT
Start: 2020-08-14 | End: 2020-08-19 | Stop reason: SDUPTHER

## 2020-08-19 ENCOUNTER — TELEPHONE (OUTPATIENT)
Dept: FAMILY MEDICINE CLINIC | Age: 40
End: 2020-08-19

## 2020-08-19 ENCOUNTER — OFFICE VISIT (OUTPATIENT)
Dept: FAMILY MEDICINE CLINIC | Age: 40
End: 2020-08-19
Payer: COMMERCIAL

## 2020-08-19 VITALS
TEMPERATURE: 97 F | DIASTOLIC BLOOD PRESSURE: 70 MMHG | HEIGHT: 62 IN | SYSTOLIC BLOOD PRESSURE: 108 MMHG | OXYGEN SATURATION: 97 % | WEIGHT: 137 LBS | HEART RATE: 72 BPM | BODY MASS INDEX: 25.21 KG/M2

## 2020-08-19 PROCEDURE — 99214 OFFICE O/P EST MOD 30 MIN: CPT | Performed by: PHYSICIAN ASSISTANT

## 2020-08-19 RX ORDER — SIMVASTATIN 20 MG
TABLET ORAL
Qty: 90 TABLET | Refills: 1 | Status: SHIPPED | OUTPATIENT
Start: 2020-08-19 | End: 2021-03-15 | Stop reason: SDUPTHER

## 2020-08-19 RX ORDER — CITALOPRAM 40 MG/1
40 TABLET ORAL DAILY
Qty: 90 TABLET | Refills: 1 | Status: SHIPPED | OUTPATIENT
Start: 2020-08-19 | End: 2021-03-15 | Stop reason: SDUPTHER

## 2020-08-19 RX ORDER — VARENICLINE TARTRATE 1 MG/1
1 TABLET, FILM COATED ORAL 2 TIMES DAILY
Qty: 60 TABLET | Refills: 5 | Status: SHIPPED | OUTPATIENT
Start: 2020-08-19 | End: 2022-01-27

## 2020-08-19 RX ORDER — PANTOPRAZOLE SODIUM 40 MG/1
40 TABLET, DELAYED RELEASE ORAL
Qty: 90 TABLET | Refills: 1 | Status: SHIPPED | OUTPATIENT
Start: 2020-08-19 | End: 2021-03-15 | Stop reason: SDUPTHER

## 2020-08-19 RX ORDER — MIDODRINE HYDROCHLORIDE 5 MG/1
5 TABLET ORAL 3 TIMES DAILY
Qty: 270 TABLET | Refills: 1 | Status: SHIPPED | OUTPATIENT
Start: 2020-08-19 | End: 2021-03-15 | Stop reason: SDUPTHER

## 2020-08-19 RX ORDER — VARENICLINE TARTRATE 0.5 MG/1
TABLET, FILM COATED ORAL
Qty: 57 TABLET | Refills: 0 | Status: SHIPPED | OUTPATIENT
Start: 2020-08-19 | End: 2022-01-27

## 2020-08-19 RX ORDER — MONTELUKAST SODIUM 10 MG/1
TABLET ORAL
Qty: 90 TABLET | Refills: 1 | Status: SHIPPED | OUTPATIENT
Start: 2020-08-19 | End: 2021-03-15 | Stop reason: SDUPTHER

## 2020-08-19 RX ORDER — LEVOCETIRIZINE DIHYDROCHLORIDE 5 MG/1
5 TABLET, FILM COATED ORAL DAILY
Qty: 90 TABLET | Refills: 1 | Status: SHIPPED | OUTPATIENT
Start: 2020-08-19 | End: 2021-03-15 | Stop reason: SDUPTHER

## 2020-08-19 RX ORDER — FLUDROCORTISONE ACETATE 0.1 MG/1
0.1 TABLET ORAL DAILY
Qty: 90 TABLET | Refills: 1 | Status: SHIPPED | OUTPATIENT
Start: 2020-08-19 | End: 2021-03-15 | Stop reason: SDUPTHER

## 2020-08-19 SDOH — HEALTH STABILITY: MENTAL HEALTH: HOW OFTEN DO YOU HAVE A DRINK CONTAINING ALCOHOL?: 2-3 TIMES A WEEK

## 2020-08-19 SDOH — HEALTH STABILITY: MENTAL HEALTH: HOW MANY STANDARD DRINKS CONTAINING ALCOHOL DO YOU HAVE ON A TYPICAL DAY?: 1 OR 2

## 2020-08-19 ASSESSMENT — PATIENT HEALTH QUESTIONNAIRE - PHQ9
2. FEELING DOWN, DEPRESSED OR HOPELESS: 1
1. LITTLE INTEREST OR PLEASURE IN DOING THINGS: 1
SUM OF ALL RESPONSES TO PHQ QUESTIONS 1-9: 2
SUM OF ALL RESPONSES TO PHQ QUESTIONS 1-9: 2
SUM OF ALL RESPONSES TO PHQ9 QUESTIONS 1 & 2: 2

## 2020-08-19 ASSESSMENT — ENCOUNTER SYMPTOMS
HOARSE VOICE: 0
HEARTBURN: 1
SINUS PRESSURE: 1
SINUS COMPLAINT: 1
RESPIRATORY NEGATIVE: 1
GLOBUS SENSATION: 1
COUGH: 0
ABDOMINAL PAIN: 1

## 2020-08-31 ENCOUNTER — HOSPITAL ENCOUNTER (OUTPATIENT)
Dept: GENERAL RADIOLOGY | Age: 40
Discharge: HOME | End: 2020-09-02
Payer: COMMERCIAL

## 2020-08-31 ENCOUNTER — HOSPITAL ENCOUNTER (OUTPATIENT)
Age: 40
Setting detail: SPECIMEN
Discharge: HOME | End: 2020-08-31
Payer: COMMERCIAL

## 2020-08-31 ENCOUNTER — OFFICE VISIT (OUTPATIENT)
Dept: PRIMARY CARE CLINIC | Age: 40
End: 2020-08-31
Payer: COMMERCIAL

## 2020-08-31 VITALS
HEART RATE: 87 BPM | TEMPERATURE: 97.8 F | HEIGHT: 61 IN | WEIGHT: 138 LBS | DIASTOLIC BLOOD PRESSURE: 68 MMHG | BODY MASS INDEX: 26.06 KG/M2 | SYSTOLIC BLOOD PRESSURE: 124 MMHG | OXYGEN SATURATION: 93 %

## 2020-08-31 PROCEDURE — 99214 OFFICE O/P EST MOD 30 MIN: CPT | Performed by: PHYSICIAN ASSISTANT

## 2020-08-31 PROCEDURE — 71045 X-RAY EXAM CHEST 1 VIEW: CPT

## 2020-08-31 PROCEDURE — U0003 INFECTIOUS AGENT DETECTION BY NUCLEIC ACID (DNA OR RNA); SEVERE ACUTE RESPIRATORY SYNDROME CORONAVIRUS 2 (SARS-COV-2) (CORONAVIRUS DISEASE [COVID-19]), AMPLIFIED PROBE TECHNIQUE, MAKING USE OF HIGH THROUGHPUT TECHNOLOGIES AS DESCRIBED BY CMS-2020-01-R: HCPCS

## 2020-08-31 RX ORDER — AZITHROMYCIN 250 MG/1
250 TABLET, FILM COATED ORAL SEE ADMIN INSTRUCTIONS
Qty: 6 TABLET | Refills: 0 | Status: SHIPPED | OUTPATIENT
Start: 2020-08-31 | End: 2020-09-05

## 2020-08-31 ASSESSMENT — PATIENT HEALTH QUESTIONNAIRE - PHQ9
2. FEELING DOWN, DEPRESSED OR HOPELESS: 1
1. LITTLE INTEREST OR PLEASURE IN DOING THINGS: 0
SUM OF ALL RESPONSES TO PHQ QUESTIONS 1-9: 1
SUM OF ALL RESPONSES TO PHQ QUESTIONS 1-9: 1
SUM OF ALL RESPONSES TO PHQ9 QUESTIONS 1 & 2: 1

## 2020-08-31 ASSESSMENT — ENCOUNTER SYMPTOMS
SHORTNESS OF BREATH: 1
WHEEZING: 1
COUGH: 1
SORE THROAT: 1

## 2020-08-31 NOTE — PROGRESS NOTES
Subjective:      Patient ID: Marty Solomon is a 41 y.o. female. Cough   This is a new problem. The current episode started in the past 7 days. The cough is productive of sputum. Associated symptoms include ear pain, myalgias, nasal congestion, a sore throat, shortness of breath and wheezing. Pertinent negatives include no fever or headaches. Risk factors for lung disease include smoking/tobacco exposure. Treatments tried: Robitussin, Ibuprofen. The treatment provided mild relief. Her past medical history is significant for environmental allergies. Recent Travel Screening and Travel History documentation:     Travel Screening     Question   Response    In the last month, have you been in contact with someone who was confirmed or suspected to have Coronavirus / COVID-19? No / Unsure    Do you have any of the following symptoms? Cough    Have you traveled internationally in the last month? No      Travel History   Travel since 07/31/20     No documented travel since 07/31/20           Review of Systems   Constitutional: Negative for fever. HENT: Positive for ear pain and sore throat. Respiratory: Positive for cough, shortness of breath and wheezing. Musculoskeletal: Positive for myalgias. Allergic/Immunologic: Positive for environmental allergies. Neurological: Negative for headaches.      Past Medical History:   Diagnosis Date    Atrophic vaginitis 3/10/2016    Chronic fatigue     Constipation     Endometriosis     h/o endometriosis for which she had a hysterectomy    Genital herpes     Hyperlipidemia     Migraine     migraine cephalgia    Neurocardiogenic syncope     Ovarian cyst     Pelvic inflammatory disease (PID)     Sinusitis, chronic     chronic sinusitis probably related to tobacco abuse    Tobacco abuse     Vitamin D deficiency      Past Surgical History:   Procedure Laterality Date    APPENDECTOMY      LAPAROSCOPY      X3 before the hysterectomy    OTHER SURGICAL HISTORY  2016    lump removed lower right leg    KRISTIN AND BSO  2006    total with bilateral salpingo-oophorectomy    WISDOM TOOTH EXTRACTION       Social History     Tobacco Use    Smoking status: Current Every Day Smoker     Packs/day: 1.00     Years: 15.00     Pack years: 15.00     Types: Cigarettes    Smokeless tobacco: Never Used    Tobacco comment: Will see PCP PRN when ready to quit. Substance Use Topics    Alcohol use: Yes     Alcohol/week: 0.0 standard drinks     Frequency: 2-3 times a week     Drinks per session: 1 or 2     Binge frequency: Never     Comment: RARE    Drug use: No     Types: Cocaine     Comment: HAS BEEN OFF CRACK COCAINE FOR SEVERAL YEARS         Objective:   Physical Exam  HENT:      Head: Normocephalic. Nose: Rhinorrhea present. Mouth/Throat:      Mouth: Mucous membranes are moist.      Pharynx: Posterior oropharyngeal erythema present. Eyes:      Pupils: Pupils are equal, round, and reactive to light. Neck:      Musculoskeletal: Neck supple. Cardiovascular:      Rate and Rhythm: Normal rate. Pulmonary:      Effort: Pulmonary effort is normal. No respiratory distress. Comments: Decreased breath sounds bases, poor inspiratory effort. No conversational dyspnea. Chest:      Chest wall: Tenderness (right lateral rib) present. Neurological:      General: No focal deficit present. Mental Status: She is alert and oriented to person, place, and time. Psychiatric:         Mood and Affect: Mood normal.       Xr Chest Portable    Result Date: 8/31/2020  EXAMINATION: ONE XRAY VIEW OF THE CHEST 8/31/2020 9:25 am COMPARISON: 02/26/2019 HISTORY: ORDERING SYSTEM PROVIDED HISTORY: Cough TECHNOLOGIST PROVIDED HISTORY: cough x 1 week Reason for Exam: Cough 1 week with fever FINDINGS: Cardiac silhouette is normal in size. Bibasilar dependent opacities noted.  Small bilateral pleural effusions noted with adjacent airspace disease, either atelectasis or multifocal infectious process. No pneumothorax identified. Trachea is midline. Osseous structures and soft tissues are grossly intact. Small bowel pleural effusions. Adjacent airspace disease noted within the lung bases bilaterally, either atelectasis or multifocal infection. Assessment:      1. Cough    2. Person under investigation for COVID-19          Plan:      COVID swab obtained. CXR reviewed with patient. Incentive spirometry discussed in detail. Push fluids. ZPak prescribed. Patient should self-quarantine until test results. ER if worsening symptoms. Follow-up PRN.         PIERO Ojeda

## 2020-08-31 NOTE — LETTER
[address] [facility]  [address] [place]  [place] [address]  Phone: [phone]  Fax: [phone]    Talita Ojeda        August 31, 2020     Patient: Teresa Beltre   YOB: 1980   Date of Visit: 8/31/2020       To Whom It May Concern: It is my medical opinion that Antonina Mary should remain out of work until treadalong. If you have any questions or concerns, please don't hesitate to call.     Sincerely,        PIERO Ojeda

## 2020-09-03 ENCOUNTER — TELEPHONE (OUTPATIENT)
Dept: FAMILY MEDICINE CLINIC | Age: 40
End: 2020-09-03

## 2020-09-03 LAB — SARS-COV-2, NAA: NOT DETECTED

## 2020-09-03 NOTE — TELEPHONE ENCOUNTER
----- Message from Talita Toth sent at 9/3/2020  8:07 AM EDT -----  Negative COVID. How is patient feeling?

## 2020-10-12 ENCOUNTER — OFFICE VISIT (OUTPATIENT)
Dept: FAMILY MEDICINE CLINIC | Age: 40
End: 2020-10-12
Payer: COMMERCIAL

## 2020-10-12 VITALS
BODY MASS INDEX: 26.21 KG/M2 | HEIGHT: 61 IN | WEIGHT: 138.8 LBS | DIASTOLIC BLOOD PRESSURE: 70 MMHG | HEART RATE: 76 BPM | SYSTOLIC BLOOD PRESSURE: 124 MMHG

## 2020-10-12 PROCEDURE — 99213 OFFICE O/P EST LOW 20 MIN: CPT | Performed by: PHYSICIAN ASSISTANT

## 2020-10-12 PROCEDURE — L3908 WHO COCK-UP NONMOLDE PRE OTS: HCPCS | Performed by: PHYSICIAN ASSISTANT

## 2020-10-12 ASSESSMENT — PATIENT HEALTH QUESTIONNAIRE - PHQ9
1. LITTLE INTEREST OR PLEASURE IN DOING THINGS: 0
2. FEELING DOWN, DEPRESSED OR HOPELESS: 1
SUM OF ALL RESPONSES TO PHQ QUESTIONS 1-9: 1
SUM OF ALL RESPONSES TO PHQ QUESTIONS 1-9: 1
SUM OF ALL RESPONSES TO PHQ9 QUESTIONS 1 & 2: 1

## 2020-10-12 ASSESSMENT — ENCOUNTER SYMPTOMS: RESPIRATORY NEGATIVE: 1

## 2020-10-12 NOTE — PATIENT INSTRUCTIONS
Patient Education        Carpal Tunnel Syndrome: Exercises  Introduction  Here are some examples of exercises for you to try. The exercises may be suggested for a condition or for rehabilitation. Start each exercise slowly. Ease off the exercises if you start to have pain. You will be told when to start these exercises and which ones will work best for you. Warm-up stretches  When you no longer have pain or numbness, you can do exercises to help prevent carpal tunnel syndrome from coming back. Do not do any stretch or movement that is uncomfortable or painful. 1. Rotate your wrist up, down, and from side to side. Repeat 4 times. 2. Stretch your fingers far apart. Relax them, and then stretch them again. Repeat 4 times. 3. Stretch your thumb by pulling it back gently, holding it, and then releasing it. Repeat 4 times. How to do the exercises  Prayer stretch   1. Start with your palms together in front of your chest just below your chin. 2. Slowly lower your hands toward your waistline, keeping your hands close to your stomach and your palms together until you feel a mild to moderate stretch under your forearms. 3. Hold for at least 15 to 30 seconds. Repeat 2 to 4 times. Wrist flexor stretch   1. Extend your arm in front of you with your palm up. 2. Bend your wrist, pointing your hand toward the floor. 3. With your other hand, gently bend your wrist farther until you feel a mild to moderate stretch in your forearm. 4. Hold for at least 15 to 30 seconds. Repeat 2 to 4 times. Wrist extensor stretch   1. Repeat steps 1 through 4 of the stretch above, but begin with your extended hand palm down. Follow-up care is a key part of your treatment and safety. Be sure to make and go to all appointments, and call your doctor if you are having problems. It's also a good idea to know your test results and keep a list of the medicines you take. Where can you learn more?   Go to https://[email or web address] and sign in to your [employer] account. Enter W582 in the [place] box to learn more about \"Carpal Tunnel Syndrome: Exercises. \"     If you do not have an account, please click on the \"Sign Up Now\" link. Current as of: March 2, 2020               Content Version: 12.6  © 2006-2020 [employer]. Care instructions adapted under license by [facility] ([facility]). If you have questions about a medical condition or this instruction, always ask your healthcare professional. [place] [address] any warranty or liability for your use of this information. Patient Education        Electromyogram (EMG) and Nerve Conduction Studies: About These Tests  What are they? An electromyogram (EMG) measures the electrical activity of your muscles when you are not using them (at rest) and when you tighten them (muscle contraction). Nerve conduction studies (NCS) measure how well and how fast the nerves can send electrical signals. EMG and nerve conduction studies are often done together. If they are done together, the nerve conduction studies are done before the EMG. Why are they done? You may need an EMG to find diseases that damage your muscles or nerves or to find why you can't move your muscles (paralysis), why they feel weak, or why they twitch. These problems may include a herniated disc, amyotrophic lateral sclerosis (ALS), or myasthenia gravis (MG). You may need nerve conduction studies to find damage to the nerves that lead from the brain and spinal cord to the rest of the body. (This is called the peripheral nervous system.) These studies are often used to help find nerve disorders, such as carpal tunnel syndrome. How do you prepare for these tests?    · Wear loose-fitting clothing. You may be given a hospital gown to wear.     · The electrodes for the test are attached to your skin.  Your skin needs to be clean and free of sprays, oils, creams, and lotions.     · You may be asked to sign a consent form that says you understand the risks of the test and agree to have it done. · Tell your doctor ALL the medicines, vitamins, supplements, and herbal remedies you take. Some may increase the risk of problems during your test. Your doctor will tell you if you should stop taking any of them before the test and how soon to do it.     · If you take aspirin or some other blood thinner, ask your doctor if you should stop taking it before your test. Make sure that you understand exactly what your doctor wants you to do. These medicines increase the risk of bleeding. How are the tests done? You lie on a table or bed or sit in a reclining chair so your muscles are relaxed. For an EMG:   · Your doctor will insert a needle electrode into a muscle. This will record the electrical activity while the muscle is at rest.  · Your doctor will ask you to tighten the same muscle slowly and steadily while the electrical activity is recorded. · Your doctor may move the electrode to a different area of the muscle or a different muscle. For nerve conduction studies:   · Your doctor will attach two types of electrodes to your skin. ? One type of electrode is placed over a nerve and will give the nerve an electrical pulse. ? The other type of electrode is placed over the muscle that the nerve controls. It will record how long it takes the muscle to react to the electrical pulse. How does having electromyogram (EMG) and nerve conduction studies feel? During an EMG test, you may feel a quick, sharp pain when the needle electrode is put into a muscle. With nerve conduction studies, you will be able to feel the electrical pulses. The tests make some people anxious. Keep in mind that only a very low-voltage electrical current is used. And each electrical pulse is very quick. It lasts less than a second. How long do they take?   · An EMG may take 30 to 60 minutes. · Nerve conduction tests may take from 15 minutes to 1 hour or more. It depends on how many nerves and muscles your doctor tests. What happens after these tests? · After the test, you may be sore and feel a tingling in your muscles. This may last for up to 2 days. · If any of the test areas are sore:  ? Put ice or a cold pack on the area for 10 to 20 minutes at a time. Put a thin cloth between the ice and your skin. ? Take an over-the-counter pain medicine, such as acetaminophen (Tylenol), ibuprofen (Advil, Motrin), or naproxen (Aleve). Be safe with medicines. Read and follow all instructions on the label. · You will probably be able to go home right away. It depends on the reason for the test.  · You can go back to your usual activities right away. When should you call for help? Watch closely for changes in your health, and be sure to contact your doctor if:  · Muscle pain from an EMG test gets worse or you have swelling, tenderness, or pus at any of the needle sites. · You have any problems that you think may be from the test.  · You have any questions about the test or have not received your results. Follow-up care is a key part of your treatment and safety. Be sure to make and go to all appointments, and call your doctor if you are having problems. It's also a good idea to keep a list of the medicines you take. Ask your doctor when you can expect to have your test results. Where can you learn more? Go to https://[email or web address].[email or web address] org and sign in to your [employer] account. Enter L461 in the [employer] box to learn more about \"Electromyogram (EMG) and Nerve Conduction Studies: About These Tests. \"     If you do not have an account, please click on the \"Sign Up Now\" link. Current as of: November 20, 2019               Content Version: 12.6  © 0118-3272 [employer]. Care instructions adapted under license by [facility] ([facility]).  If you have questions about a medical condition or this instruction, always ask your healthcare professional. [place] [address] any warranty or liability for your use of this information.

## 2020-10-12 NOTE — PROGRESS NOTES
Subjective:      Patient ID: Zuly Weiss is a 41 y.o. female. Hand Pain    The incident occurred more than 1 week ago. The injury mechanism was repetitive motion. The pain is present in the right fingers and right hand. The pain has been worsening since the incident. Associated symptoms include muscle weakness, numbness and tingling. She has tried NSAIDs for the symptoms. The treatment provided no relief. Patient is right-hand dominant. Review of Systems   Constitutional: Negative. HENT: Negative. Respiratory: Negative. Cardiovascular: Negative. Neurological: Positive for tingling and numbness. Objective:   Physical Exam  HENT:      Head: Normocephalic. Cardiovascular:      Rate and Rhythm: Normal rate and regular rhythm. Pulmonary:      Effort: Pulmonary effort is normal.      Breath sounds: Normal breath sounds. Musculoskeletal:      Comments: + Tinel ,+ Phalen bilaterally   Neurological:      General: No focal deficit present. Mental Status: She is alert. Psychiatric:         Mood and Affect: Mood normal.         Assessment:      1. Right hand paresthesia          Plan:      Schedule EMG bilateral upper extremities. HEP recommended. Wrist cock-up splint applied. Discussed referral to orthopedics. Procedures    Procare Comfort Form Wrist Brace     Patient was prescribed a Procare Comfort Form Wrist brace. The right wrist will require stabilization / immobilization from this semi-rigid / rigid orthosis to improve their function. The orthosis will assist in protecting the affected area, provide functional support and facilitate healing. The patient was educated and fit by a healthcare professional with expert knowledge and specialization in brace application while under the direct supervision of the treating physician. Verbal and written instructions for the use of and application of this item were provided.    They were instructed to contact the office immediately should the brace result in increased pain, decreased sensation, increased swelling or worsening of the condition. Follow-up as planned/sooner PRN.         PIERO Phillips

## 2020-11-24 ENCOUNTER — PROCEDURE VISIT (OUTPATIENT)
Dept: PODIATRY | Age: 40
End: 2020-11-24
Payer: COMMERCIAL

## 2020-11-24 VITALS
RESPIRATION RATE: 20 BRPM | DIASTOLIC BLOOD PRESSURE: 66 MMHG | BODY MASS INDEX: 26.07 KG/M2 | SYSTOLIC BLOOD PRESSURE: 124 MMHG | WEIGHT: 138 LBS | HEART RATE: 80 BPM

## 2020-11-24 PROCEDURE — 99999 PR OFFICE/OUTPT VISIT,PROCEDURE ONLY: CPT | Performed by: PODIATRIST

## 2020-11-24 PROCEDURE — 11750 EXCISION NAIL&NAIL MATRIX: CPT | Performed by: PODIATRIST

## 2020-11-24 RX ORDER — METHYLPREDNISOLONE 4 MG/1
TABLET ORAL
Qty: 1 KIT | Refills: 0 | Status: SHIPPED | OUTPATIENT
Start: 2020-11-24 | End: 2020-12-07

## 2020-11-24 RX ORDER — SIMVASTATIN 20 MG
TABLET ORAL
Qty: 90 TABLET | Refills: 1 | OUTPATIENT
Start: 2020-11-24

## 2020-11-24 NOTE — PROGRESS NOTES
varenicline (CHANTIX) 0.5 MG tablet 0.5mg DAILY for 3 days followed by 0.5mg TWICE DAILY for 4 days followed by 1mg TWICE DAILY 8/19/20  Yes PIERO Hinojosa   varenicline (CHANTIX) 1 MG tablet Take 1 tablet by mouth 2 times daily 8/19/20  Yes Talita Hinojosa   levocetirizine (XYZAL) 5 MG tablet Take 1 tablet by mouth daily 8/19/20  Yes PIERO Hinojosa   pantoprazole (PROTONIX) 40 MG tablet Take 1 tablet by mouth every morning (before breakfast) 8/19/20  Yes PIERO Hinojosa   oxybutynin (DITROPAN XL) 10 MG extended release tablet Take 1 tablet by mouth daily 12/2/19  Yes Steven Solis MD   diphenhydrAMINE (BENADRYL) 25 MG tablet Take 25 mg by mouth nightly as needed for Itching   Yes Historical Provider, MD   vitamin D (CHOLECALCIFEROL) 1000 UNIT TABS tablet Take 2 tablets by mouth daily. gummies   Yes Historical Provider, MD   Multiple Vitamins-Minerals (MULTIVITAMIN PO) Take 1 tablet by mouth daily. Yes Historical Provider, MD   BLACK COHOSH ROOT Take 600 mg by mouth 2 times daily as needed. FOR HOT FLASHES   Yes Historical Provider, MD     ROS: All 14 ROS systems reviewed and pertinent positives noted above, all others negative. Objective:  Patient is alert and oriented. Vascular: DP and PT pulses palpable 2/4, bilateral.  CFT <3 seconds, bilateral.  Hair growth present to the level of the digits, bilateral.  Edema absent, bilateral.  Varicosities absent, bilateral. Erythema absent, bilateral. Distal Rubor absent bilateral.  Temperature within normal limits bilateral. Hyperpigmentation absent bilateral. No atrophic skin. Neuro: Protective sensation is intact. Reflexes WNL. Musculoskeletal:  Pain present upon palpation of right, hallux. Muscle strength 5/5, Bilateral. within normal limits medial longitudinal arch, Bilateral. ROM WNL. Integument: Onychocryptosis present right, lateral, medial, hallux. Granuloma is absent right. Paronychia changes with drainage and crust are absent right. Deformed R hallux nail. Skin color, texture, turgor normal. No rashes or lesions. .    Assessment:   Diagnosis Orders   1. OC (onychocryptosis)  MT REMOVAL OF NAIL BED   2. Deformity of nail bed  MT REMOVAL OF NAIL BED   3. Pain of toe of right foot  MT REMOVAL OF NAIL BED         Plan:  Patient was educated on all treatment options. Risks and complications were discussed with the patient and they opted for a phenol matrixectomy nail procedure on the right, hallux. Verbal and signed consent took place. A total of 3cc 1% lidocaine plain was used to anesthetize the right, hallux. It was then prepped and draped in the usual sterile manner. Anesthesia was checked and was adequate. The right, hallux nail border and matrix was removed. No remaining nail spicules. Three consecutive 30 seconds applications of 00% phenol were then applied to the nail matrix with an applicator. Rinsed with normal saline. A dry sterile dressing of silvadene or antibiotic ointment with telfa and coban took place. Patient will leave dry and intact for 24 hours then start soaking bid in warm soapy water or epsom salts then apply the ointment and a band aid for the first week then continue once per day for the second week. Patient will use OTC anti-inflammatory or tylenol PRN for pain. Post nail procedure instructions were given. Any signs of infections and patient should be see back in the office immediately. Orders Placed This Encounter   Medications    silver sulfADIAZINE (SILVADENE) 1 % cream     Sig: Apply topically daily. Dispense:  30 g     Refill:  1    methylPREDNISolone (MEDROL DOSEPACK) 4 MG tablet     Sig: Take by mouth. Dispense:  1 kit     Refill:  0     Patient has history of inflammatory reaction post nail procedures. Patient will take Medrol Dosepak if this starts.

## 2020-12-01 ENCOUNTER — HOSPITAL ENCOUNTER (OUTPATIENT)
Dept: NEUROLOGY | Age: 40
Discharge: HOME | End: 2020-12-01
Payer: COMMERCIAL

## 2020-12-01 PROCEDURE — 95886 MUSC TEST DONE W/N TEST COMP: CPT

## 2020-12-01 PROCEDURE — 95912 NRV CNDJ TEST 11-12 STUDIES: CPT

## 2020-12-01 NOTE — PROGRESS NOTES
EMG/NCS Bilateral    upper Completed    PCP: PIERO Saucedo    Ordering: Talita Khanna    Interpreting Physician: Grant Chaney.  Shankar Lee Neurologist    Technician: Bryan García RN

## 2020-12-01 NOTE — PROCEDURES
[address]                 [address] [place] [id number]                          EMG/NERVE CONDUCTION STUDIES REPORT        PATIENT NAME: Seun Dove                :        1980  MED REC NO:   [id number]                             ROOM:  ACCOUNT NO:   [de-identified]                           ADMIT DATE: 2020  PROVIDER:     Troy Marcial MD, F.A. A. N    DATE OF EM2020    REFERRING PROVIDER:  Domitila Sotelo      TECHNICAL SUMMARY:  The nature, purpose, goals, expectations and process  involved in the procedures of nerve conduction studies and needle  electromyography were reviewed, discussed, explained and verbal consent  was obtained from the patient. The patient's questions were answered  with reference to the above processes and procedures. There were no significant technical difficulties encountered during this  study and nerve conduction studies were performed under temperature  monitoring. CLINICAL DATA:        The patient is 41years old with a history of tingling,  numbness, paresthesias involving from elbow down into the fingers,  hands; decreased hand  bilaterally, more so on the right side. These symptoms are present for the last two years and progressively  getting worse in the last four months. The patient indicated she works  in a [address] [employer] The patient also with pain in the hands. No history of  diabetes, no history of trauma. The purpose of the study is to evaluate for mononeuropathy,  radiculopathy, plexopathy, peripheral polyneuropathy. SUMMARY:        The sensory nerve action potentials of the right and left  radial nerves were not recordable bilaterally. Sensory nerve action potentials of the right and left median and ulnar  nerves were not recordable bilaterally. Mixed nerve palmar potentials of the right and left median and ulnar  nerves were not recordable bilaterally.     Compound muscle action potentials of the right and left median nerve  shows normal amplitudes, distal latency, borderline conduction  velocities. Compound muscle action potentials of the right and left ulnar nerve  shows normal amplitude, distal latencies, conduction velocities  bilaterally. Proximal conductions as measured by the F responses were not recordable  in the right median nerve, normal in the left median nerve. Ulnar F  responses are within normal limits. Nerve  Conductions   Results  Were  Personally  Reviewed and  Analysed. Abnormal  Nerve  Conductions  Were  Personally  Repeated,  Verified, reconfirmed  And  Updated as needed  appropriately. Please    See   Wave  Forms   And    Details  Of     Nerve  Conduction   Studies   For  Additional  Information             Extensive   Needle  Electromyography  Was personally  Performed  In  Both      Upper   Extremities   In  The  Following  Muscles :    Deltoid,  Biceps  Brachii,  Triceps . Pronator  Teres,  Flexor Carpi Ulnaris,    Ext. Digitorum Communis,  FDI (Hand)         Extensive  Needle  Electromyography  Shows  No   Abnormality with :      A) Normal  insertional  Activity. There  Is  Absence  Of   P  Waves,  Fibrillations,  Fasciculations and        Other  Spontaneous   Activity. B) Voluntary  Motor unit  Potentials    Show :    Normal  Effort,  Recruitment, amplitude,  Duration. No Polyphasia  Noted. IMPRESSION:      1. There is electrodiagnostic evidence of moderate median         mononeuropathy at wrist (carpal tunnel syndrome) bilaterally. 2.  There is electrodiagnostic evidence of sensory ulnar neuropathy at         wrist bilaterally. 3.  There is also electrodiagnostic evidence suggestive of sensory          peripheral polyneuropathy involving examined both upper extremities.         4.  There is no definite electrodiagnostic evidence of lower cervical         radiculopathy involving examined both upper extremities. Further clinical correlation, followup recommended. Katelyn Waldron MD, [address] [place] [address]     Board Certified in Neurology  & in  [address] of Psychiatry and Neurology ([facility]). D: 12/01/2020 10:03:38       T: 12/01/2020 10:39:55     [id number]  Job#: [id number]     Doc#: [id number]    CC:     Talita Umanzor

## 2020-12-03 ENCOUNTER — TELEPHONE (OUTPATIENT)
Dept: FAMILY MEDICINE CLINIC | Age: 40
End: 2020-12-03

## 2020-12-03 NOTE — TELEPHONE ENCOUNTER
----- Message from Diogo Guzman [place] sent at 12/3/2020 10:15 AM EST -----  Moderate carpal tunnel bilaterally. Referral to orthopedics.

## 2020-12-07 ENCOUNTER — OFFICE VISIT (OUTPATIENT)
Dept: UROLOGY | Age: 40
End: 2020-12-07
Payer: COMMERCIAL

## 2020-12-07 ENCOUNTER — OFFICE VISIT (OUTPATIENT)
Dept: ORTHOPEDIC SURGERY | Age: 40
End: 2020-12-07
Payer: COMMERCIAL

## 2020-12-07 VITALS
HEART RATE: 66 BPM | BODY MASS INDEX: 26.06 KG/M2 | OXYGEN SATURATION: 96 % | WEIGHT: 138 LBS | DIASTOLIC BLOOD PRESSURE: 72 MMHG | RESPIRATION RATE: 12 BRPM | SYSTOLIC BLOOD PRESSURE: 136 MMHG | HEIGHT: 61 IN

## 2020-12-07 VITALS
BODY MASS INDEX: 25.4 KG/M2 | HEART RATE: 66 BPM | SYSTOLIC BLOOD PRESSURE: 136 MMHG | WEIGHT: 138 LBS | DIASTOLIC BLOOD PRESSURE: 72 MMHG | HEIGHT: 62 IN

## 2020-12-07 PROCEDURE — 99203 OFFICE O/P NEW LOW 30 MIN: CPT | Performed by: ORTHOPAEDIC SURGERY

## 2020-12-07 PROCEDURE — 99212 OFFICE O/P EST SF 10 MIN: CPT | Performed by: UROLOGY

## 2020-12-07 RX ORDER — OXYBUTYNIN CHLORIDE 10 MG/1
10 TABLET, EXTENDED RELEASE ORAL DAILY
Qty: 90 TABLET | Refills: 3 | Status: SHIPPED | OUTPATIENT
Start: 2020-12-07 | End: 2022-01-31 | Stop reason: SDUPTHER

## 2020-12-07 NOTE — PROGRESS NOTES
Orthopedic Office Note  [facility] [address]  [address], [address]  [facility] [address] [phone]      CHIEF COMPLAINT:    Chief Complaint   Patient presents with    Carpal Tunnel     bruna cts       HISTORY OF PRESENT ILLNESS:      The patient is a 41 y.o. female  who presents today for bilateral hand paresthesias which have progressively getting worse. Symptoms have been present for years. She reports all fingers are involved including the thumb other than the small finger. She has had an EMG study and was referred here for evaluation. She reports symptoms occur constantly throughout the day and wake her up at night. Fingertips feel permanently numb. She wears splints at night which did not alleviate her symptoms. Past Medical History:    Past Medical History:   Diagnosis Date    Atrophic vaginitis 3/10/2016    Chronic fatigue     Constipation     Endometriosis     h/o endometriosis for which she had a hysterectomy    Genital herpes     Hyperlipidemia     Migraine     migraine cephalgia    Neurocardiogenic syncope     Ovarian cyst     Pelvic inflammatory disease (PID)     Sinusitis, chronic     chronic sinusitis probably related to tobacco abuse    Tobacco abuse     Vitamin D deficiency        Past Surgical History:    Past Surgical History:   Procedure Laterality Date    APPENDECTOMY      LAPAROSCOPY      X3 before the hysterectomy    OTHER SURGICAL HISTORY  2016    lump removed lower right leg    KRISTIN AND BSO  2006    total with bilateral salpingo-oophorectomy    WISDOM TOOTH EXTRACTION         Medications Prior to Admission:   Current Outpatient Medications   Medication Sig Dispense Refill    oxybutynin (DITROPAN XL) 10 MG extended release tablet Take 1 tablet by mouth daily 90 tablet 3    silver sulfADIAZINE (SILVADENE) 1 % cream Apply topically daily.  30 g 1    simvastatin (ZOCOR) 20 MG tablet TAKE ONE TABLET BY MOUTH EVERY NIGHT 90 tablet 1    citalopram (CELEXA) 40 MG tablet Take 1 tablet by mouth daily 90 tablet 1    montelukast (SINGULAIR) 10 MG tablet TAKE ONE TABLET BY MOUTH EVERY EVENING 90 tablet 1    metoprolol tartrate (LOPRESSOR) 25 MG tablet TAKE ONE TABLET BY MOUTH TWICE A  tablet 1    fludrocortisone (FLORINEF) 0.1 MG tablet Take 1 tablet by mouth daily 90 tablet 1    midodrine (PROAMATINE) 5 MG tablet Take 1 tablet by mouth 3 times daily 270 tablet 1    varenicline (CHANTIX) 0.5 MG tablet 0.5mg DAILY for 3 days followed by 0.5mg TWICE DAILY for 4 days followed by 1mg TWICE DAILY 57 tablet 0    varenicline (CHANTIX) 1 MG tablet Take 1 tablet by mouth 2 times daily 60 tablet 5    levocetirizine (XYZAL) 5 MG tablet Take 1 tablet by mouth daily 90 tablet 1    pantoprazole (PROTONIX) 40 MG tablet Take 1 tablet by mouth every morning (before breakfast) 90 tablet 1    diphenhydrAMINE (BENADRYL) 25 MG tablet Take 25 mg by mouth nightly as needed for Itching      vitamin D (CHOLECALCIFEROL) 1000 UNIT TABS tablet Take 2 tablets by mouth daily. gummies      Multiple Vitamins-Minerals (MULTIVITAMIN PO) Take 1 tablet by mouth daily.  BLACK COHOSH ROOT Take 600 mg by mouth 2 times daily as needed. FOR HOT FLASHES       No current facility-administered medications for this visit. Allergies:  Amoxicillin; Other; and Pcn [penicillins]    Social History:   Social History     Tobacco Use   Smoking Status Current Every Day Smoker    Packs/day: 1.00    Years: 15.00    Pack years: 15.00    Types: Cigarettes    Start date: 1/1/1998   Smokeless Tobacco Never Used   Tobacco Comment    Will see PCP PRN when ready to quit.        Social History     Substance and Sexual Activity   Alcohol Use Yes    Alcohol/week: 0.0 standard drinks    Frequency: 2-3 times a week    Drinks per session: 1 or 2    Binge frequency: Never    Comment: RARE     Social History     Substance and Sexual Activity   Drug Use No    Types: Cocaine    Comment: HAS BEEN OFF CRACK COCAINE FOR SEVERAL YEARS       Family History:  Family History   Problem Relation Age of Onset    Heart Disease Mother         heart murmur/valvular heart disease    Other Mother         family h/o diabetes    Migraines Mother         runs in females on her side of family   Juan Marie Stroke Mother 62    Hypertension Mother    Juan Marie Parkinsonism Mother     Depression Mother     Other Father         family h/o diabetes    Diabetes Maternal Grandmother         diabetes    Diabetes Maternal Grandfather         diabetes    Cancer Paternal Grandfather 76        Acute Leukemia and infection/sudden death    Diabetes Maternal Uncle         Type 2         REVIEW OF SYSTEMS:  Please see the ROS form attached to today's encounter. I have reviewed it with the patient during the visit. All other systems were reviewed and are negative. PHYSICAL EXAM:  On exam today she is alert and oriented x3. She is in no obvious distress. Her general appearance is within normal limits. Both hands were evaluated and her skin is intact. She does seem to have some slight thenar atrophy of both hands. She has full finger range of motion. She has diminished sensation to light touch in the thumb, index, long, and ring fingers bilaterally. She has positive median nerve compression test and positive Phalen's test.  Normal sensation in the small fingers. Tinel's over the ulnar nerve is are negative. Elbow flexion test does not reproduce numbness or tingling. She has good capillary refill and no lymphadenopathy. Radiology:  EMG studies were reviewed and show median neuropathy at the wrist, ulnar neuropathy at the wrist, peripheral neuropathy    ASSESSMENT/PLAN:  1. Bilateral carpal tunnel syndrome        I have discussed treatment options with the patient. Clinically her symptoms seem most consistent with carpal tunnel syndrome.   I have discussed that the EMG study indicates ulnar neuropathy at the wrist and also peripheral polyneuropathy. I have recommended we first treat the carpal tunnel syndrome given that clinically her symptoms seem most consistent with median neuropathy at the wrist.  I have discussed there is a possibility for incomplete resolution in symptoms given that some of this could be coming from peripheral neuropathy or ulnar neuropathy although clinically this does not seem to be the case. We have gone through informed consent today in clinic. She understands risks associated with surgery and has elected to proceed with a right followed by left endoscopic carpal tunnel release. No orders of the defined types were placed in this encounter.        Isael Ashely MD

## 2020-12-07 NOTE — PROGRESS NOTES
from:    CT scan 2014    IMPRESSION:    No acute CT finding in the abdomen or pelvis. Nonvisualization the appendix. Status post hysterectomy. No right lower    quadrant inflammatory process, bowel obstruction, pathologic free fluid    or free air.       No obstructive uropathy or obvious stone; tiny stones could be missed on    this study.       Mild atelectatic or fibrotic changes LLL.       Report Electronically signed by Ashley Ferguson M.D. on 7/22/2014 9:15 AM   Transcribed by: [facility] on Jul 22 2014  9:17A    Read by: Jonathan Anderson M.D.  [id number] on Jul 22 2014  9:17A    Electronically Signed by: Maxwell Anderson M.D. on: Kathe Agostouro 22 2014     9:17A          PAST MEDICAL, FAMILY AND SOCIAL HISTORY:  Past Medical History:   Diagnosis Date    Atrophic vaginitis 3/10/2016    Chronic fatigue     Constipation     Endometriosis     h/o endometriosis for which she had a hysterectomy    Genital herpes     Hyperlipidemia     Migraine     migraine cephalgia    Neurocardiogenic syncope     Ovarian cyst     Pelvic inflammatory disease (PID)     Sinusitis, chronic     chronic sinusitis probably related to tobacco abuse    Tobacco abuse     Vitamin D deficiency      Past Surgical History:   Procedure Laterality Date    APPENDECTOMY      LAPAROSCOPY      X3 before the hysterectomy    OTHER SURGICAL HISTORY  2016    lump removed lower right leg    KRISTIN AND BSO  2006    total with bilateral salpingo-oophorectomy    WISDOM TOOTH EXTRACTION       Family History   Problem Relation Age of Onset    Heart Disease Mother         heart murmur/valvular heart disease    Other Mother         family h/o diabetes    Migraines Mother         runs in females on her side of family   [facility] Stroke Mother 62    Hypertension Mother    [facility] Parkinsonism Mother     Depression Mother     Other Father         family h/o diabetes    Diabetes Maternal Grandmother         diabetes    Diabetes Maternal Grandfather         diabetes    Cancer Paternal Grandfather 76        Acute Leukemia and infection/sudden death    Diabetes Maternal Uncle         Type 2     Outpatient Medications Marked as Taking for the 12/7/20 encounter (Office Visit) with Cristin Larson MD   Medication Sig Dispense Refill    silver sulfADIAZINE (SILVADENE) 1 % cream Apply topically daily. 30 g 1    simvastatin (ZOCOR) 20 MG tablet TAKE ONE TABLET BY MOUTH EVERY NIGHT 90 tablet 1    citalopram (CELEXA) 40 MG tablet Take 1 tablet by mouth daily 90 tablet 1    montelukast (SINGULAIR) 10 MG tablet TAKE ONE TABLET BY MOUTH EVERY EVENING 90 tablet 1    metoprolol tartrate (LOPRESSOR) 25 MG tablet TAKE ONE TABLET BY MOUTH TWICE A  tablet 1    fludrocortisone (FLORINEF) 0.1 MG tablet Take 1 tablet by mouth daily 90 tablet 1    midodrine (PROAMATINE) 5 MG tablet Take 1 tablet by mouth 3 times daily 270 tablet 1    varenicline (CHANTIX) 0.5 MG tablet 0.5mg DAILY for 3 days followed by 0.5mg TWICE DAILY for 4 days followed by 1mg TWICE DAILY 57 tablet 0    varenicline (CHANTIX) 1 MG tablet Take 1 tablet by mouth 2 times daily 60 tablet 5    levocetirizine (XYZAL) 5 MG tablet Take 1 tablet by mouth daily 90 tablet 1    pantoprazole (PROTONIX) 40 MG tablet Take 1 tablet by mouth every morning (before breakfast) 90 tablet 1    oxybutynin (DITROPAN XL) 10 MG extended release tablet Take 1 tablet by mouth daily 90 tablet 3    diphenhydrAMINE (BENADRYL) 25 MG tablet Take 25 mg by mouth nightly as needed for Itching      vitamin D (CHOLECALCIFEROL) 1000 UNIT TABS tablet Take 2 tablets by mouth daily. gummies      Multiple Vitamins-Minerals (MULTIVITAMIN PO) Take 1 tablet by mouth daily.  BLACK COHOSH ROOT Take 600 mg by mouth 2 times daily as needed. FOR HOT FLASHES         Amoxicillin;  Other; and Pcn [penicillins]  Social History     Tobacco Use   Smoking Status Current Every Day Smoker    Packs/day: 1.00    Years: 15.00    Pack years: 15.00    Types: Cigarettes  Start date: 1/1/1998   Smokeless Tobacco Never Used   Tobacco Comment    Will see PCP PRN when ready to quit. (If patient a smoker, smoking cessation counseling offered)   Social History     Substance and Sexual Activity   Alcohol Use Yes    Alcohol/week: 0.0 standard drinks    Frequency: 2-3 times a week    Drinks per session: 1 or 2    Binge frequency: Never    Comment: RARE       REVIEW OF SYSTEMS:  Constitutional: negative  Eyes: negative  Respiratory: negative  Cardiovascular: negative  Gastrointestinal: negative  Genitourinary: see HPI  Musculoskeletal: negative  Skin: negative   Neurological: negative  Hematological/Lymphatic: negative  Psychological: negative      Physical Exam:    This a 41 y.o. female  Vitals:    12/07/20 0906   BP: 136/72   Pulse: 66   Resp: 12   SpO2: 96%     Body mass index is 26.07 kg/m². Constitutional: Patient in no acute distress;         Assessment and Plan        1. OAB (overactive bladder)               Plan:        Has tried other anticholinergics before   100 percent improvement on Oxybutynin  Decrease bladder irritants  1 year follow up      Prescriptions Ordered:  No orders of the defined types were placed in this encounter. Orders Placed:  No orders of the defined types were placed in this encounter.            Kalin Ruiz MD

## 2020-12-11 RX ORDER — TRAZODONE HYDROCHLORIDE 50 MG/1
TABLET ORAL
Qty: 90 TABLET | Refills: 0 | OUTPATIENT
Start: 2020-12-11

## 2020-12-14 RX ORDER — TRAZODONE HYDROCHLORIDE 50 MG/1
50 TABLET ORAL NIGHTLY PRN
Qty: 90 TABLET | Refills: 1 | Status: SHIPPED | OUTPATIENT
Start: 2020-12-14 | End: 2021-09-15 | Stop reason: SDUPTHER

## 2021-03-15 ENCOUNTER — OFFICE VISIT (OUTPATIENT)
Dept: FAMILY MEDICINE CLINIC | Age: 41
End: 2021-03-15
Payer: COMMERCIAL

## 2021-03-15 VITALS
OXYGEN SATURATION: 98 % | DIASTOLIC BLOOD PRESSURE: 68 MMHG | BODY MASS INDEX: 25.03 KG/M2 | WEIGHT: 136 LBS | TEMPERATURE: 98 F | SYSTOLIC BLOOD PRESSURE: 122 MMHG | HEIGHT: 62 IN | HEART RATE: 80 BPM

## 2021-03-15 DIAGNOSIS — K21.9 GASTROESOPHAGEAL REFLUX DISEASE, UNSPECIFIED WHETHER ESOPHAGITIS PRESENT: Primary | ICD-10-CM

## 2021-03-15 DIAGNOSIS — R55 NEUROCARDIOGENIC SYNCOPE: ICD-10-CM

## 2021-03-15 DIAGNOSIS — E78.2 MIXED HYPERLIPIDEMIA: ICD-10-CM

## 2021-03-15 DIAGNOSIS — J32.4 CHRONIC PANSINUSITIS: ICD-10-CM

## 2021-03-15 DIAGNOSIS — Z72.0 TOBACCO ABUSE: ICD-10-CM

## 2021-03-15 DIAGNOSIS — F41.8 ANXIETY WITH DEPRESSION: ICD-10-CM

## 2021-03-15 PROCEDURE — 99214 OFFICE O/P EST MOD 30 MIN: CPT | Performed by: PHYSICIAN ASSISTANT

## 2021-03-15 RX ORDER — LEVOCETIRIZINE DIHYDROCHLORIDE 5 MG/1
5 TABLET, FILM COATED ORAL DAILY
Qty: 90 TABLET | Refills: 1 | Status: SHIPPED | OUTPATIENT
Start: 2021-03-15 | End: 2021-09-15 | Stop reason: SDUPTHER

## 2021-03-15 RX ORDER — MIDODRINE HYDROCHLORIDE 5 MG/1
5 TABLET ORAL 3 TIMES DAILY
Qty: 270 TABLET | Refills: 1 | Status: SHIPPED | OUTPATIENT
Start: 2021-03-15 | End: 2021-09-15 | Stop reason: SDUPTHER

## 2021-03-15 RX ORDER — CITALOPRAM 40 MG/1
40 TABLET ORAL DAILY
Qty: 90 TABLET | Refills: 1 | Status: SHIPPED | OUTPATIENT
Start: 2021-03-15 | End: 2021-09-15 | Stop reason: SDUPTHER

## 2021-03-15 RX ORDER — SIMVASTATIN 20 MG
TABLET ORAL
Qty: 90 TABLET | Refills: 1 | Status: SHIPPED | OUTPATIENT
Start: 2021-03-15 | End: 2021-11-08

## 2021-03-15 RX ORDER — PANTOPRAZOLE SODIUM 40 MG/1
40 TABLET, DELAYED RELEASE ORAL 2 TIMES DAILY
Qty: 180 TABLET | Refills: 1 | Status: SHIPPED | OUTPATIENT
Start: 2021-03-15 | End: 2021-09-15 | Stop reason: SDUPTHER

## 2021-03-15 RX ORDER — FLUDROCORTISONE ACETATE 0.1 MG/1
0.1 TABLET ORAL DAILY
Qty: 90 TABLET | Refills: 1 | Status: SHIPPED | OUTPATIENT
Start: 2021-03-15 | End: 2021-09-15 | Stop reason: SDUPTHER

## 2021-03-15 RX ORDER — MONTELUKAST SODIUM 10 MG/1
TABLET ORAL
Qty: 90 TABLET | Refills: 1 | Status: SHIPPED | OUTPATIENT
Start: 2021-03-15 | End: 2021-09-15 | Stop reason: SDUPTHER

## 2021-03-15 ASSESSMENT — PATIENT HEALTH QUESTIONNAIRE - PHQ9
SUM OF ALL RESPONSES TO PHQ QUESTIONS 1-9: 2
1. LITTLE INTEREST OR PLEASURE IN DOING THINGS: 1
2. FEELING DOWN, DEPRESSED OR HOPELESS: 1
SUM OF ALL RESPONSES TO PHQ QUESTIONS 1-9: 2

## 2021-03-15 ASSESSMENT — ENCOUNTER SYMPTOMS
ABDOMINAL PAIN: 0
SORE THROAT: 0
HEARTBURN: 1
COUGH: 1
CHANGE IN BOWEL HABIT: 0

## 2021-03-15 NOTE — PROGRESS NOTES
Subjective:      Patient ID: Maria E Siu is a 41 y.o. female. Cough  This is a chronic problem. The current episode started in the past 7 days. The problem has been unchanged. The cough is productive of sputum. Associated symptoms include ear pain and heartburn. Pertinent negatives include no fever, headaches, sore throat or weight loss. She has tried OTC cough suppressant for the symptoms. The treatment provided mild relief. Gastroesophageal Reflux  She complains of coughing and heartburn. She reports no abdominal pain or no sore throat. This is a chronic problem. The current episode started more than 1 year ago. The problem has been gradually worsening. Pertinent negatives include no fatigue or weight loss. Risk factors include smoking/tobacco exposure. She has tried a PPI for the symptoms. The treatment provided moderate relief. Hyperlipidemia  This is a chronic problem. The current episode started more than 1 year ago. The problem is controlled. Current antihyperlipidemic treatment includes statins. The current treatment provides significant improvement of lipids. There are no compliance problems. Mental Health Problem  The primary symptoms do not include dysphoric mood. The current episode started more than 1 month ago. This is a chronic problem. The onset of the illness is precipitated by emotional stress. The degree of incapacity that she is experiencing as a consequence of her illness is moderate. Additional symptoms of the illness include agitation (related to OT at work). Additional symptoms of the illness do not include anhedonia, insomnia, fatigue, headaches or abdominal pain. She does not admit to suicidal ideas. She does not have a plan to attempt suicide. Dizziness  This is a chronic problem. The current episode started more than 1 year ago. The problem has been unchanged. Associated symptoms include congestion and coughing.  Pertinent negatives include no abdominal pain, change in bowel habit, fatigue, fever, headaches or sore throat. Treatments tried: midodrine. Review of Systems   Constitutional: Negative for fatigue, fever and weight loss. HENT: Positive for congestion and ear pain. Negative for sore throat. No loss of taste/smell   Respiratory: Positive for cough. Gastrointestinal: Positive for heartburn. Negative for abdominal pain and change in bowel habit. Neurological: Positive for dizziness. Negative for headaches. Psychiatric/Behavioral: Positive for agitation (related to OT at work). Negative for dysphoric mood. The patient does not have insomnia. Past Medical History:   Diagnosis Date    Atrophic vaginitis 3/10/2016    Chronic fatigue     Constipation     Endometriosis     h/o endometriosis for which she had a hysterectomy    Genital herpes     Hyperlipidemia     Migraine     migraine cephalgia    Neurocardiogenic syncope     Ovarian cyst     Pelvic inflammatory disease (PID)     Sinusitis, chronic     chronic sinusitis probably related to tobacco abuse    Tobacco abuse     Vitamin D deficiency      Past Surgical History:   Procedure Laterality Date    APPENDECTOMY      LAPAROSCOPY      X3 before the hysterectomy    OTHER SURGICAL HISTORY  2016    lump removed lower right leg    KRISTIN AND BSO  2006    total with bilateral salpingo-oophorectomy    WISDOM TOOTH EXTRACTION       Social History     Tobacco Use    Smoking status: Current Every Day Smoker     Packs/day: 1.00     Years: 15.00     Pack years: 15.00     Types: Cigarettes     Start date: 1/1/1998    Smokeless tobacco: Never Used    Tobacco comment: Will see PCP PRN when ready to quit. Substance Use Topics    Alcohol use:  Yes     Alcohol/week: 0.0 standard drinks     Frequency: 2-3 times a week     Drinks per session: 1 or 2     Binge frequency: Never     Comment: RARE    Drug use: No     Types: Cocaine     Comment: HAS BEEN OFF CRACK COCAINE FOR SEVERAL YEARS

## 2021-03-29 ENCOUNTER — TELEPHONE (OUTPATIENT)
Dept: FAMILY MEDICINE CLINIC | Age: 41
End: 2021-03-29

## 2021-04-14 ENCOUNTER — OFFICE VISIT (OUTPATIENT)
Dept: PRIMARY CARE CLINIC | Age: 41
End: 2021-04-14
Payer: COMMERCIAL

## 2021-04-14 VITALS
SYSTOLIC BLOOD PRESSURE: 118 MMHG | HEART RATE: 98 BPM | BODY MASS INDEX: 24.8 KG/M2 | HEIGHT: 62 IN | RESPIRATION RATE: 20 BRPM | OXYGEN SATURATION: 96 % | TEMPERATURE: 97.6 F | WEIGHT: 134.8 LBS | DIASTOLIC BLOOD PRESSURE: 74 MMHG

## 2021-04-14 DIAGNOSIS — J01.40 ACUTE NON-RECURRENT PANSINUSITIS: Primary | ICD-10-CM

## 2021-04-14 DIAGNOSIS — R05.9 COUGH: ICD-10-CM

## 2021-04-14 DIAGNOSIS — R06.2 WHEEZES: ICD-10-CM

## 2021-04-14 PROCEDURE — 99213 OFFICE O/P EST LOW 20 MIN: CPT | Performed by: NURSE PRACTITIONER

## 2021-04-14 PROCEDURE — 96372 THER/PROPH/DIAG INJ SC/IM: CPT | Performed by: NURSE PRACTITIONER

## 2021-04-14 RX ORDER — ALBUTEROL SULFATE 90 UG/1
2 AEROSOL, METERED RESPIRATORY (INHALATION) EVERY 4 HOURS PRN
Qty: 1 INHALER | Refills: 0 | Status: SHIPPED | OUTPATIENT
Start: 2021-04-14

## 2021-04-14 RX ORDER — AZITHROMYCIN 250 MG/1
TABLET, FILM COATED ORAL
Qty: 1 PACKET | Refills: 0 | Status: SHIPPED | OUTPATIENT
Start: 2021-04-14 | End: 2021-09-15

## 2021-04-14 RX ORDER — BETAMETHASONE SODIUM PHOSPHATE AND BETAMETHASONE ACETATE 3; 3 MG/ML; MG/ML
6 INJECTION, SUSPENSION INTRA-ARTICULAR; INTRALESIONAL; INTRAMUSCULAR; SOFT TISSUE ONCE
Status: COMPLETED | OUTPATIENT
Start: 2021-04-14 | End: 2021-04-14

## 2021-04-14 RX ADMIN — BETAMETHASONE SODIUM PHOSPHATE AND BETAMETHASONE ACETATE 6 MG: 3; 3 INJECTION, SUSPENSION INTRA-ARTICULAR; INTRALESIONAL; INTRAMUSCULAR; SOFT TISSUE at 09:04

## 2021-04-14 ASSESSMENT — ENCOUNTER SYMPTOMS
RHINORRHEA: 0
SORE THROAT: 0
WHEEZING: 1
COUGH: 1
SINUS PRESSURE: 1
SHORTNESS OF BREATH: 1
GASTROINTESTINAL NEGATIVE: 1

## 2021-04-14 ASSESSMENT — PATIENT HEALTH QUESTIONNAIRE - PHQ9
SUM OF ALL RESPONSES TO PHQ QUESTIONS 1-9: 2
SUM OF ALL RESPONSES TO PHQ QUESTIONS 1-9: 2

## 2021-04-14 NOTE — PATIENT INSTRUCTIONS
Patient Education        Cough: Care Instructions  Your Care Instructions     A cough is your body's response to something that bothers your throat or airways. Many things can cause a cough. You might cough because of a cold or the flu, bronchitis, or asthma. Smoking, postnasal drip, allergies, and stomach acid that backs up into your throat also can cause coughs. A cough is a symptom, not a disease. Most coughs stop when the cause, such as a cold, goes away. You can take a few steps at home to cough less and feel better. Follow-up care is a key part of your treatment and safety. Be sure to make and go to all appointments, and call your doctor if you are having problems. It's also a good idea to know your test results and keep a list of the medicines you take. How can you care for yourself at home? · Drink lots of water and other fluids. This helps thin the mucus and soothes a dry or sore throat. Honey or lemon juice in hot water or tea may ease a dry cough. · Take cough medicine as directed by your doctor. · Prop up your head on pillows to help you breathe and ease a dry cough. · Try cough drops to soothe a dry or sore throat. Cough drops don't stop a cough. Medicine-flavored cough drops are no better than candy-flavored drops or hard candy. · Do not smoke. Avoid secondhand smoke. If you need help quitting, talk to your doctor about stop-smoking programs and medicines. These can increase your chances of quitting for good. When should you call for help? Call 911 anytime you think you may need emergency care. For example, call if:    · You have severe trouble breathing. Call your doctor now or seek immediate medical care if:    · You cough up blood.     · You have new or worse trouble breathing.     · You have a new or higher fever.     · You have a new rash.    Watch closely for changes in your health, and be sure to contact your doctor if:    · You cough more deeply or more often, especially if you [employer]. Care instructions adapted under license by [facility] ([facility]). If you have questions about a medical condition or this instruction, always ask your healthcare professional. [address] any warranty or liability for your use of this information.

## 2021-04-14 NOTE — PROGRESS NOTES
[facility]             [address], [address]                        Telephone [phone]             Fax [phone]     Jay Waldron  1980  VYZ:[id number]   Date of visit:  4/14/2021    Subjective:    Jay Waldron is a 35 y.o.  female who presents to [facility] today (4/14/2021) for evaluation of:    Chief Complaint   Patient presents with    Cough     congestion,SOB,started in March       Cough  This is a new problem. The current episode started more than 1 month ago (X 4-5 weeks she was told it was allergies). The problem has been unchanged. The problem occurs every few minutes. The cough is productive of sputum (infrequent small amount clear mucus). Associated symptoms include ear congestion (bilateral), headaches, nasal congestion, postnasal drip, shortness of breath (with \"coughing spell\") and wheezing. Pertinent negatives include no chest pain, chills, fever, myalgias, rash, rhinorrhea or sore throat. Associated symptoms comments: Bilateral ears ache; dry \"raspy\" throat and clearing throat often; cough worse in the morning; denies difficulty sleeping; sinus pressure 6/10. Nothing aggravates the symptoms. Treatments tried: robitussin, xyzal. The treatment provided no relief.  GERD       She has the following problem list:  Patient Active Problem List   Diagnosis    Vitamin D deficiency    Sinusitis, chronic    Migraine    Constipation    Genital herpes    Neurocardiogenic syncope    Chronic fatigue    Tobacco abuse    Atrophic vaginitis    Mixed hyperlipidemia    Lower urinary tract symptoms        Current medications are:  Current Outpatient Medications   Medication Sig Dispense Refill    azithromycin (ZITHROMAX Z-SULMA) 250 MG tablet Take 2 tablets (500 mg) on Day 1, and then take 1 tablet (250 mg) on days 2 through 5. 1 packet 0    albuterol sulfate HFA (PROVENTIL understanding. No orders of the defined types were placed in this encounter.         Electronically signed by OSEI Fonseca CNP on 4/14/21 at 8:38 AM EDT

## 2021-04-15 ENCOUNTER — TELEPHONE (OUTPATIENT)
Dept: FAMILY MEDICINE CLINIC | Age: 41
End: 2021-04-15

## 2021-04-15 NOTE — TELEPHONE ENCOUNTER
Please call patient and advise her to stop Celexa for the 5 days that she is taking the zithromax. She can then resume the Celexa.

## 2021-05-12 ENCOUNTER — TELEPHONE (OUTPATIENT)
Dept: PRIMARY CARE CLINIC | Age: 41
End: 2021-05-12

## 2021-05-12 NOTE — LETTER
[address]  Urgent Care A department of [facility]  Phone: [phone]  Fax:   Aung George, [address]        May 12, 2021    [place] [phone]  [id number]      Dear Valentina Little: This is to remind you that you have fasting blood work that [employer] would like you to complete. If you have any questions or concerns, please don't hesitate to call.     Sincerely,        PIERO Montalvo/Virgilio

## 2021-06-23 ENCOUNTER — TELEPHONE (OUTPATIENT)
Dept: FAMILY MEDICINE CLINIC | Age: 41
End: 2021-06-23

## 2021-09-15 ENCOUNTER — OFFICE VISIT (OUTPATIENT)
Dept: FAMILY MEDICINE CLINIC | Age: 41
End: 2021-09-15
Payer: COMMERCIAL

## 2021-09-15 ENCOUNTER — TELEPHONE (OUTPATIENT)
Dept: FAMILY MEDICINE CLINIC | Age: 41
End: 2021-09-15

## 2021-09-15 VITALS
SYSTOLIC BLOOD PRESSURE: 100 MMHG | WEIGHT: 135 LBS | HEART RATE: 72 BPM | HEIGHT: 61 IN | DIASTOLIC BLOOD PRESSURE: 60 MMHG | BODY MASS INDEX: 25.49 KG/M2

## 2021-09-15 DIAGNOSIS — J32.4 CHRONIC PANSINUSITIS: ICD-10-CM

## 2021-09-15 DIAGNOSIS — E55.9 VITAMIN D DEFICIENCY: ICD-10-CM

## 2021-09-15 DIAGNOSIS — K21.9 GASTROESOPHAGEAL REFLUX DISEASE, UNSPECIFIED WHETHER ESOPHAGITIS PRESENT: Primary | ICD-10-CM

## 2021-09-15 DIAGNOSIS — G47.00 INSOMNIA, UNSPECIFIED TYPE: ICD-10-CM

## 2021-09-15 DIAGNOSIS — F41.8 ANXIETY WITH DEPRESSION: ICD-10-CM

## 2021-09-15 DIAGNOSIS — R55 NEUROCARDIOGENIC SYNCOPE: ICD-10-CM

## 2021-09-15 DIAGNOSIS — E78.2 MIXED HYPERLIPIDEMIA: ICD-10-CM

## 2021-09-15 PROCEDURE — 99214 OFFICE O/P EST MOD 30 MIN: CPT | Performed by: PHYSICIAN ASSISTANT

## 2021-09-15 RX ORDER — BUSPIRONE HYDROCHLORIDE 5 MG/1
5 TABLET ORAL 2 TIMES DAILY
Qty: 60 TABLET | Refills: 2 | Status: SHIPPED | OUTPATIENT
Start: 2021-09-15 | End: 2022-01-05

## 2021-09-15 RX ORDER — MIDODRINE HYDROCHLORIDE 5 MG/1
5 TABLET ORAL 3 TIMES DAILY
Qty: 270 TABLET | Refills: 1 | Status: SHIPPED | OUTPATIENT
Start: 2021-09-15 | End: 2022-08-15

## 2021-09-15 RX ORDER — LEVOCETIRIZINE DIHYDROCHLORIDE 5 MG/1
5 TABLET, FILM COATED ORAL DAILY
Qty: 90 TABLET | Refills: 1 | Status: SHIPPED | OUTPATIENT
Start: 2021-09-15 | End: 2022-05-05 | Stop reason: SDUPTHER

## 2021-09-15 RX ORDER — PANTOPRAZOLE SODIUM 40 MG/1
40 TABLET, DELAYED RELEASE ORAL 2 TIMES DAILY
Qty: 180 TABLET | Refills: 1 | Status: SHIPPED | OUTPATIENT
Start: 2021-09-15 | End: 2022-04-04

## 2021-09-15 RX ORDER — FLUDROCORTISONE ACETATE 0.1 MG/1
0.1 TABLET ORAL DAILY
Qty: 90 TABLET | Refills: 1 | Status: SHIPPED | OUTPATIENT
Start: 2021-09-15

## 2021-09-15 RX ORDER — MONTELUKAST SODIUM 10 MG/1
TABLET ORAL
Qty: 90 TABLET | Refills: 1 | Status: SHIPPED | OUTPATIENT
Start: 2021-09-15 | End: 2022-07-05

## 2021-09-15 RX ORDER — TRAZODONE HYDROCHLORIDE 50 MG/1
50 TABLET ORAL NIGHTLY PRN
Qty: 90 TABLET | Refills: 1 | Status: SHIPPED | OUTPATIENT
Start: 2021-09-15 | End: 2022-08-05 | Stop reason: SDUPTHER

## 2021-09-15 RX ORDER — CITALOPRAM 40 MG/1
40 TABLET ORAL DAILY
Qty: 90 TABLET | Refills: 1 | Status: SHIPPED | OUTPATIENT
Start: 2021-09-15 | End: 2022-08-05 | Stop reason: SDUPTHER

## 2021-09-15 SDOH — ECONOMIC STABILITY: FOOD INSECURITY: WITHIN THE PAST 12 MONTHS, YOU WORRIED THAT YOUR FOOD WOULD RUN OUT BEFORE YOU GOT MONEY TO BUY MORE.: NEVER TRUE

## 2021-09-15 SDOH — ECONOMIC STABILITY: FOOD INSECURITY: WITHIN THE PAST 12 MONTHS, THE FOOD YOU BOUGHT JUST DIDN'T LAST AND YOU DIDN'T HAVE MONEY TO GET MORE.: NEVER TRUE

## 2021-09-15 ASSESSMENT — SOCIAL DETERMINANTS OF HEALTH (SDOH)
HOW HARD IS IT FOR YOU TO PAY FOR THE VERY BASICS LIKE FOOD, HOUSING, MEDICAL CARE, AND HEATING?: NOT HARD AT ALL
HOW HARD IS IT FOR YOU TO PAY FOR THE VERY BASICS LIKE FOOD, HOUSING, MEDICAL CARE, AND HEATING?: NOT HARD AT ALL

## 2021-09-17 NOTE — PROGRESS NOTES
CHIEF COMPLAINT  Chief Complaint   Patient presents with    6 Month Follow-Up    Anxiety     Feels down more than she feels better. Bianca Bhardwaj is a 35 y.o. female who presents to the office for follow-up of chronic medical conditions. Patient admits to increased anxiety recently. Patient is taking her citalopram with trazodone to sleep. Patient sees a counselor monthly at [employer]. Patient says that she feels down at times. She is not interested in changing citalopram because she thinks this has been effective for her. Patient reports compliance with all prescribed medications. Reflux symptoms have been stable. She does have chronic migraines that require injections in UC at times. She admits that sinuses can trigger migraines. Patient takes Florinef for NCS and does find this to be effective. Patient declines all vaccinations. She is due for laboratory studies and agrees to completing. She denies any additional concerns or complaints today. ROS  All other review of systems negative, except for those noted.     PAST MEDICAL HISTORY    Past Medical History:   Diagnosis Date    Atrophic vaginitis 3/10/2016    Chronic fatigue     Constipation     Endometriosis     h/o endometriosis for which she had a hysterectomy    Genital herpes     Hyperlipidemia     Migraine     migraine cephalgia    Neurocardiogenic syncope     Ovarian cyst     Pelvic inflammatory disease (PID)     Sinusitis, chronic     chronic sinusitis probably related to tobacco abuse    Tobacco abuse     Vitamin D deficiency        SURGICAL HISTORY    Past Surgical History:   Procedure Laterality Date    APPENDECTOMY      LAPAROSCOPY      X3 before the hysterectomy    OTHER SURGICAL HISTORY  2016    lump removed lower right leg    KRISTIN AND BSO  2006    total with bilateral salpingo-oophorectomy    WISDOM TOOTH EXTRACTION         FAMILY HISTORY    Family History   Problem Relation Age of Onset    Heart Disease Mother         heart murmur/valvular heart disease    Other Mother         family h/o diabetes    Migraines Mother         runs in females on her side of family   Chance Stroke Mother 62    Hypertension Mother    Chance Parkinsonism Mother     Depression Mother     Other Father         family h/o diabetes    Diabetes Maternal Grandmother         diabetes    Diabetes Maternal Grandfather         diabetes    Cancer Paternal Grandfather 76        Acute Leukemia and infection/sudden death    Diabetes Maternal Uncle         Type 2       SOCIAL HISTORY    Social History     Socioeconomic History    Marital status:      Spouse name: None    Number of children: None    Years of education: None    Highest education level: None   Occupational History    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 Vaping Use    Vaping Use: Never used   Substance and Sexual Activity    Alcohol use: Yes     Alcohol/week: 0.0 standard drinks     Comment: RARE    Drug use: No     Types: Cocaine     Comment: HAS BEEN OFF CRACK COCAINE FOR SEVERAL YEARS    Sexual activity: Yes     Partners: Male     Birth control/protection: Surgical     Comment:    Other Topics Concern    None   Social History Narrative    None     Social Determinants of Health     Financial Resource Strain: Low Risk     Difficulty of Paying Living Expenses: Not hard at all   Food Insecurity: No Food Insecurity    Worried About Running Out of Food in the Last Year: Never true    Heron of Food in the Last Year: Never true   Transportation Needs:     Lack of Transportation (Medical):      Lack of Transportation (Non-Medical):    Physical Activity:     Days of Exercise per Week:     Minutes of Exercise per Session:    Stress:     Feeling of Stress :    Social Connections:     Frequency of Communication with Friends and Family:     Frequency of Social Gatherings with Friends and Family:     Attends Voodoo Services:     Active Member of Clubs or Organizations:     Attends Club or Organization Meetings:     Marital Status:    Intimate Partner Violence:     Fear of Current or Ex-Partner:     Emotionally Abused:     Physically Abused:     Sexually Abused:        MEDICATIONS  Current Outpatient Medications   Medication Sig Dispense Refill    pantoprazole (PROTONIX) 40 MG tablet Take 1 tablet by mouth 2 times daily 180 tablet 1    citalopram (CELEXA) 40 MG tablet Take 1 tablet by mouth daily 90 tablet 1    montelukast (SINGULAIR) 10 MG tablet TAKE ONE TABLET BY MOUTH EVERY EVENING 90 tablet 1    metoprolol tartrate (LOPRESSOR) 25 MG tablet TAKE ONE TABLET BY MOUTH TWICE A  tablet 1    fludrocortisone (FLORINEF) 0.1 MG tablet Take 1 tablet by mouth daily 90 tablet 1    midodrine (PROAMATINE) 5 MG tablet Take 1 tablet by mouth 3 times daily 270 tablet 1    levocetirizine (XYZAL) 5 MG tablet Take 1 tablet by mouth daily 90 tablet 1    traZODone (DESYREL) 50 MG tablet Take 1 tablet by mouth nightly as needed for Sleep 90 tablet 1    busPIRone (BUSPAR) 5 MG tablet Take 1 tablet by mouth 2 times daily 60 tablet 2    albuterol sulfate HFA (PROVENTIL HFA) 108 (90 Base) MCG/ACT inhaler Inhale 2 puffs into the lungs every 4 hours as needed for Wheezing 1 Inhaler 0    simvastatin (ZOCOR) 20 MG tablet TAKE ONE TABLET BY MOUTH EVERY NIGHT 90 tablet 1    oxybutynin (DITROPAN XL) 10 MG extended release tablet Take 1 tablet by mouth daily 90 tablet 3    diphenhydrAMINE (BENADRYL) 25 MG tablet Take 25 mg by mouth nightly as needed for Itching      vitamin D (CHOLECALCIFEROL) 1000 UNIT TABS tablet Take 2 tablets by mouth daily.  gummies      Multiple Vitamins-Minerals (MULTIVITAMIN PO) Take 1 tablet by mouth daily.  BLACK COHOSH ROOT Take 600 mg by mouth 2 times daily as needed. FOR HOT FLASHES      varenicline (CHANTIX) 0.5 MG tablet 0.5mg DAILY for 3 days followed by 0.5mg TWICE DAILY for 4 days followed by 1mg TWICE DAILY (Patient not taking: Reported on 9/15/2021) 57 tablet 0    varenicline (CHANTIX) 1 MG tablet Take 1 tablet by mouth 2 times daily (Patient not taking: Reported on 3/15/2021) 60 tablet 5     No current facility-administered medications for this visit. ALLERGIES  Allergies   Allergen Reactions    Amoxicillin Nausea Only     GI UPSET    Other      seaosanal allergies      Pcn [Penicillins] Nausea Only     GI UPSET       PHYSICAL EXAM:   Vital Signs: /60 (Site: Left Upper Arm, Position: Sitting, Cuff Size: Medium Adult)   Pulse 72   Ht 5' 1\" (1.549 m)   Wt 135 lb (61.2 kg)   LMP 08/01/2006   BMI 25.51 kg/m²   Constitutional:  Alert and oriented x 3   HENT:  Normocephalic, Atraumatic, Bilateral external ears normal, Oropharynx moist, No oral exudates, Nose normal. Neck- Normal range of motion, No tenderness, Supple, No stridor. Eyes:  PERRL, Conjunctiva normal, No discharge. Respiratory:  Normal breath sounds, No respiratory distress, No wheezing, No chest tenderness. Cardiovascular:  Normal heart rate, Normal rhythm, No murmurs, No rubs, No gallops. GI:  Bowel sounds normal, Soft, No tenderness, No masses, No pulsatile masses. Musculoskeletal:  Intact distal pulses, No edema, No tenderness, No cyanosis, No clubbing. Good range of motion in all major joints. No tenderness to palpation or major deformities noted. Back- No tenderness. Integument:  Warm, Dry, No erythema, No rash. Lymphatic:  No lymphadenopathy noted. Neurologic:  Alert & oriented x 3, Normal motor function, Normal sensory function, No focal deficits noted. Psychiatric:  Affect flat, Mood normal.     RESULTS  Ordered in this encounter.     FINAL DIAGNOSIS AND ORDERS   Diagnosis Orders   1. Gastroesophageal reflux disease, unspecified whether esophagitis present  pantoprazole (PROTONIX) 40 MG tablet    Hemoglobin A1C    TSH with Reflex   2. Anxiety with depression  citalopram (CELEXA) 40 MG tablet    Hemoglobin A1C    busPIRone (BUSPAR) 5 MG tablet    TSH with Reflex   3. Chronic pansinusitis  montelukast (SINGULAIR) 10 MG tablet    Hemoglobin A1C    TSH with Reflex   4. Neurocardiogenic syncope  metoprolol tartrate (LOPRESSOR) 25 MG tablet    fludrocortisone (FLORINEF) 0.1 MG tablet    midodrine (PROAMATINE) 5 MG tablet    Hemoglobin A1C    TSH with Reflex   5. Insomnia, unspecified type  traZODone (DESYREL) 50 MG tablet    Hemoglobin A1C    TSH with Reflex   6. Mixed hyperlipidemia  Lipid Panel    Comprehensive Metabolic Panel    Hemoglobin A1C    TSH with Reflex   7. Vitamin D deficiency  Hemoglobin A1C    Vitamin D 25 Hydroxy       ASSESSMENT & PLAN  1. Update fasting laboratory studies. Interval history reviewed. Continue chronic medications. Add Buspar 5 mg bid. Coping strategies. Continue to follow with counselor. Patient declined vaccinations. Follow-up in 6 months/sooner PRN.     DISCHARGE MEDS  Outpatient Encounter Medications as of 9/15/2021   Medication Sig Dispense Refill    pantoprazole (PROTONIX) 40 MG tablet Take 1 tablet by mouth 2 times daily 180 tablet 1    citalopram (CELEXA) 40 MG tablet Take 1 tablet by mouth daily 90 tablet 1    montelukast (SINGULAIR) 10 MG tablet TAKE ONE TABLET BY MOUTH EVERY EVENING 90 tablet 1    metoprolol tartrate (LOPRESSOR) 25 MG tablet TAKE ONE TABLET BY MOUTH TWICE A  tablet 1    fludrocortisone (FLORINEF) 0.1 MG tablet Take 1 tablet by mouth daily 90 tablet 1    midodrine (PROAMATINE) 5 MG tablet Take 1 tablet by mouth 3 times daily 270 tablet 1    levocetirizine (XYZAL) 5 MG tablet Take 1 tablet by mouth daily 90 tablet 1    traZODone (DESYREL) 50 MG tablet Take 1 tablet by mouth nightly as needed for Sleep 90 tablet 1    busPIRone (BUSPAR) 5 MG tablet Take 1 tablet by mouth 2 times daily 60 tablet 2    albuterol sulfate HFA (PROVENTIL HFA) 108 (90 Base) MCG/ACT inhaler Inhale 2 puffs into the lungs every 4 hours as needed for Wheezing 1 Inhaler 0    simvastatin (ZOCOR) 20 MG tablet TAKE ONE TABLET BY MOUTH EVERY NIGHT 90 tablet 1    oxybutynin (DITROPAN XL) 10 MG extended release tablet Take 1 tablet by mouth daily 90 tablet 3    diphenhydrAMINE (BENADRYL) 25 MG tablet Take 25 mg by mouth nightly as needed for Itching      vitamin D (CHOLECALCIFEROL) 1000 UNIT TABS tablet Take 2 tablets by mouth daily. gummies      Multiple Vitamins-Minerals (MULTIVITAMIN PO) Take 1 tablet by mouth daily.  BLACK COHOSH ROOT Take 600 mg by mouth 2 times daily as needed. FOR HOT FLASHES      [DISCONTINUED] azithromycin (ZITHROMAX Z-SULMA) 250 MG tablet Take 2 tablets (500 mg) on Day 1, and then take 1 tablet (250 mg) on days 2 through 5. (Patient not taking: Reported on 9/15/2021) 1 packet 0    [DISCONTINUED] pantoprazole (PROTONIX) 40 MG tablet Take 1 tablet by mouth 2 times daily 180 tablet 1    [DISCONTINUED] citalopram (CELEXA) 40 MG tablet Take 1 tablet by mouth daily 90 tablet 1    [DISCONTINUED] montelukast (SINGULAIR) 10 MG tablet TAKE ONE TABLET BY MOUTH EVERY EVENING 90 tablet 1    [DISCONTINUED] metoprolol tartrate (LOPRESSOR) 25 MG tablet TAKE ONE TABLET BY MOUTH TWICE A  tablet 1    [DISCONTINUED] fludrocortisone (FLORINEF) 0.1 MG tablet Take 1 tablet by mouth daily 90 tablet 1    [DISCONTINUED] midodrine (PROAMATINE) 5 MG tablet Take 1 tablet by mouth 3 times daily 270 tablet 1    [DISCONTINUED] levocetirizine (XYZAL) 5 MG tablet Take 1 tablet by mouth daily 90 tablet 1    [DISCONTINUED] traZODone (DESYREL) 50 MG tablet Take 1 tablet by mouth nightly as needed for Sleep 90 tablet 1    [DISCONTINUED] silver sulfADIAZINE (SILVADENE) 1 % cream Apply topically daily.  (Patient not taking: Reported on 3/15/2021) 30 g 1    varenicline (CHANTIX) 0.5 MG tablet 0.5mg DAILY for 3 days followed by 0.5mg TWICE DAILY for 4 days followed by 1mg TWICE DAILY (Patient not taking: Reported on 9/15/2021) 57 tablet 0    varenicline (CHANTIX) 1 MG tablet Take 1 tablet by mouth 2 times daily (Patient not taking: Reported on 3/15/2021) 60 tablet 5     No facility-administered encounter medications on file as of 9/15/2021.

## 2021-09-28 ENCOUNTER — TELEPHONE (OUTPATIENT)
Dept: FAMILY MEDICINE CLINIC | Age: 41
End: 2021-09-28

## 2021-11-06 DIAGNOSIS — E78.2 MIXED HYPERLIPIDEMIA: ICD-10-CM

## 2021-11-08 RX ORDER — SIMVASTATIN 20 MG
TABLET ORAL
Qty: 30 TABLET | Refills: 0 | Status: SHIPPED | OUTPATIENT
Start: 2021-11-08 | End: 2022-03-16 | Stop reason: SDUPTHER

## 2021-11-08 NOTE — TELEPHONE ENCOUNTER
Elsie Press called requesting a refill of the below medication which has been pended for you:     Requested Prescriptions     Pending Prescriptions Disp Refills    simvastatin (ZOCOR) 20 MG tablet [Pharmacy Med Name: SIMVASTATIN 20 MG TABLET] 90 tablet 1     Sig: TAKE ONE TABLET BY MOUTH ONCE NIGHTLY       Last Appointment Date: 9/15/2021  Next Appointment Date: 3/18/2022    Allergies   Allergen Reactions    Amoxicillin Nausea Only     GI UPSET    Other      seaosanal allergies      Pcn [Penicillins] Nausea Only     GI UPSET

## 2021-12-10 ENCOUNTER — TELEPHONE (OUTPATIENT)
Dept: FAMILY MEDICINE CLINIC | Age: 41
End: 2021-12-10

## 2021-12-10 NOTE — TELEPHONE ENCOUNTER
----- Message from Mary Constantino sent at 12/10/2021  8:14 AM EST -----  Subject: Appointment Request    Reason for Call: Semi-Routine Cough, Cold Symptoms    QUESTIONS  Type of Appointment? Established Patient  Reason for appointment request? No appointments available during search  Additional Information for Provider? Pt called in and stated has been   having cough , runny nose, congestion , headache for a couple of days and   did not want vv and want to be seen in person . Screened red   ---------------------------------------------------------------------------  --------------  CALL BACK INFO  What is the best way for the office to contact you? OK to leave message on   voicemail  Preferred Call Back Phone Number? [phone]  ---------------------------------------------------------------------------  --------------  SCRIPT ANSWERS  Relationship to Patient? Self  Are you currently unable to finish sentences due to any difficulty   breathing? No  Are you unable to swallow liquids? No  Are you having fevers (100.4 or greater), chills, or sweats? No  Do you have COPD, asthma or a chronic lung condition? No  Have your symptoms been present for more than 5 days? No  Have you recently (14 days) been seen by a provider for this issue? No  Have you been diagnosed with, awaiting test results for, or told that you   are suspected of having COVID-19 (Coronavirus)? (If patient has tested   negative or was tested as a requirement for work, school, or travel and   not based on symptoms, answer no)? No  Within the past two weeks have you developed any of the following symptoms   (answer no if symptoms have been present longer than 2 weeks or began   more than 2 weeks ago)?  Fever or Chills, Cough, Shortness of breath or   difficulty breathing, Loss of taste or smell, Sore throat, Nasal   congestion, Sneezing or runny nose, Fatigue or generalized body aches   (answer no if pain is specific to a body part e.g. back pain), Diarrhea,   Headache?  Yes

## 2021-12-13 ENCOUNTER — HOSPITAL ENCOUNTER (OUTPATIENT)
Age: 41
Setting detail: SPECIMEN
Discharge: HOME | End: 2021-12-13
Payer: COMMERCIAL

## 2021-12-13 ENCOUNTER — HOSPITAL ENCOUNTER (OUTPATIENT)
Dept: GENERAL RADIOLOGY | Age: 41
Discharge: HOME | End: 2021-12-15
Payer: COMMERCIAL

## 2021-12-13 ENCOUNTER — OFFICE VISIT (OUTPATIENT)
Dept: PRIMARY CARE CLINIC | Age: 41
End: 2021-12-13
Payer: COMMERCIAL

## 2021-12-13 VITALS
OXYGEN SATURATION: 92 % | DIASTOLIC BLOOD PRESSURE: 88 MMHG | SYSTOLIC BLOOD PRESSURE: 138 MMHG | TEMPERATURE: 97.6 F | WEIGHT: 132 LBS | BODY MASS INDEX: 24.94 KG/M2 | HEART RATE: 88 BPM

## 2021-12-13 DIAGNOSIS — J40 BRONCHITIS: Primary | ICD-10-CM

## 2021-12-13 DIAGNOSIS — R06.02 SOB (SHORTNESS OF BREATH): ICD-10-CM

## 2021-12-13 DIAGNOSIS — R05.9 COUGH: ICD-10-CM

## 2021-12-13 DIAGNOSIS — R53.83 FATIGUE, UNSPECIFIED TYPE: ICD-10-CM

## 2021-12-13 DIAGNOSIS — Z20.822 PERSON UNDER INVESTIGATION FOR COVID-19: ICD-10-CM

## 2021-12-13 DIAGNOSIS — R06.2 WHEEZING: ICD-10-CM

## 2021-12-13 DIAGNOSIS — J01.40 ACUTE NON-RECURRENT PANSINUSITIS: ICD-10-CM

## 2021-12-13 PROCEDURE — U0005 INFEC AGEN DETEC AMPLI PROBE: HCPCS

## 2021-12-13 PROCEDURE — 99214 OFFICE O/P EST MOD 30 MIN: CPT | Performed by: NURSE PRACTITIONER

## 2021-12-13 PROCEDURE — U0003 INFECTIOUS AGENT DETECTION BY NUCLEIC ACID (DNA OR RNA); SEVERE ACUTE RESPIRATORY SYNDROME CORONAVIRUS 2 (SARS-COV-2) (CORONAVIRUS DISEASE [COVID-19]), AMPLIFIED PROBE TECHNIQUE, MAKING USE OF HIGH THROUGHPUT TECHNOLOGIES AS DESCRIBED BY CMS-2020-01-R: HCPCS

## 2021-12-13 PROCEDURE — 71046 X-RAY EXAM CHEST 2 VIEWS: CPT

## 2021-12-13 RX ORDER — AZITHROMYCIN 250 MG/1
250 TABLET, FILM COATED ORAL SEE ADMIN INSTRUCTIONS
Qty: 6 TABLET | Refills: 0 | Status: SHIPPED | OUTPATIENT
Start: 2021-12-13 | End: 2021-12-18

## 2021-12-13 RX ORDER — ALBUTEROL SULFATE 90 UG/1
2 AEROSOL, METERED RESPIRATORY (INHALATION) 4 TIMES DAILY PRN
Qty: 18 G | Refills: 0 | Status: SHIPPED | OUTPATIENT
Start: 2021-12-13 | End: 2022-05-17

## 2021-12-13 RX ORDER — BENZONATATE 100 MG/1
100 CAPSULE ORAL 3 TIMES DAILY PRN
Qty: 30 CAPSULE | Refills: 0 | Status: SHIPPED | OUTPATIENT
Start: 2021-12-13 | End: 2021-12-23

## 2021-12-13 ASSESSMENT — ENCOUNTER SYMPTOMS
SINUS COMPLAINT: 1
CHEST TIGHTNESS: 0
COUGH: 1
SHORTNESS OF BREATH: 1
WHEEZING: 1
DIARRHEA: 0
VOMITING: 0
NAUSEA: 0

## 2021-12-13 NOTE — PATIENT INSTRUCTIONS
Will notify you of covid test result as soon as available. You should isolate at home in an area away from family. If you must be around family members, please wear a mask. Quarantine at home until result is available. This means do not go to work/school, attend family gatherings, or invite others to your home until you know your test results. A work/school note will be provided for you. Chest xray does not show any acute findings at this time. Start zpak today. Use albuterol inhaler with spacer every 4-6 hours as needed for wheezing. Tessalon as needed for cough. Can use mucinex DM as well for cough and congestion. Increase fluids to help thin secretions. Sinus rinses. Cool mist humidifier at bedside at night. Monitor your oxygenation with a pulse oximeter at home. Take several deep breaths periodically to help open your lungs. If your oxygen level is consistently below 90%, please go to ER. Patient Education        Learning About Using a Pulse Oximeter  What is a pulse oximeter? A pulse oximeter is a device that checks to see how much oxygen your blood is carrying. Usually a small clip is put on the end of your finger. (Sometimes it's put on your toe or earlobe.) The device shines a light beam through the skin. It estimates your oxygen level by measuring the percentage of your blood that's carrying oxygen. Your oxygen level (or oxygen saturation, SpO2) shows on the display screen. Pulse oximeters are used in doctors' offices and hospitals. Your doctor may think it's a good idea to use one at home. This may be the case for people who have a condition that affects their oxygen levels. Examples include people who have long-term heart or lung problems or an infection like COVID-19. Choose a device that has been approved to give accurate readings. Talk to your doctor if you want help choosing one. Why is it used?   Usually, low blood oxygen levels cause symptoms like fatigue or shortness of breath. But with some health problems, you may not have symptoms from low blood oxygen. Your doctor may suggest checking your oxygen at different times. This can help you know when you need medical attention even if you don't have symptoms. How do you use a pulse oximeter? Turn on the pulse oximeter. (Check that it has batteries.) Clip it on the end of a finger. Your nail should be facing up. You'll see the results in a few seconds. The device gives two results: your blood oxygen level (SpO2) and your pulse rate (HI). Your doctor can help you know what numbers are normal for you. The device may not show any results if you have cold hands or you wear nail polish or artificial nails. Warm your hand, or remove the nail polish or nail. Or try a different finger. Your doctor may suggest checking your oxygen level at different times, during exercise, or anytime your symptoms get worse. Keep a record of your levels in case you need to show it to your doctor. When should you call for help? Your doctor probably told you what numbers to watch for when you use your pulse oximeter. If not, here is some guidance. Call your doctor if:    · Your blood oxygen level (SpO2) drops below 95%. This is true even if the number only drops when you're active. If you have certain health problems, like COPD, your oxygen level may always be lower than 95%. Ask your doctor what oxygen number you should expect when using your pulse oximeter. Find out which number is a sign that you should call for help. Watch closely for changes in your health, and be sure to contact your doctor if:    · Your symptoms get worse.     · You are not getting better as expected. Follow-up care is a key part of your treatment and safety. Be sure to make and go to all appointments, and call your doctor if you are having problems. It's also a good idea to know your test results and keep a list of the medicines you take.   Current as of: July 6, 2021               Content Version: 13.0  © 2006-2021 [employer]. Care instructions adapted under license by [facility] ([facility]). If you have questions about a medical condition or this instruction, always ask your healthcare professional. [address] any warranty or liability for your use of this information. Patient Education        Sinusitis: Care Instructions  Your Care Instructions     Sinusitis is an infection of the lining of the sinus cavities in your head. Sinusitis often follows a cold. It causes pain and pressure in your head and face. In most cases, sinusitis gets better on its own in 1 to 2 weeks. But some mild symptoms may last for several weeks. Sometimes antibiotics are needed. Follow-up care is a key part of your treatment and safety. Be sure to make and go to all appointments, and call your doctor if you are having problems. It's also a good idea to know your test results and keep a list of the medicines you take. How can you care for yourself at home? · Take an over-the-counter pain medicine, such as acetaminophen (Tylenol), ibuprofen (Advil, Motrin), or naproxen (Aleve). Read and follow all instructions on the label. · If the doctor prescribed antibiotics, take them as directed. Do not stop taking them just because you feel better. You need to take the full course of antibiotics. · Be careful when taking over-the-counter cold or flu medicines and Tylenol at the same time. Many of these medicines have acetaminophen, which is Tylenol. Read the labels to make sure that you are not taking more than the recommended dose. Too much acetaminophen (Tylenol) can be harmful. · Breathe warm, moist air from a steamy shower, a hot bath, or a sink filled with hot water. Avoid cold, dry air. Using a humidifier in your home may help. Follow the directions for cleaning the machine. · Use saline (saltwater) nasal washes.  This can help keep your nasal passages open and wash out mucus and bacteria. You can buy saline nose drops at a grocery store or drugstore. Or you can make your own at home by adding 1 teaspoon of salt and 1 teaspoon of baking soda to 2 cups of distilled water. If you make your own, fill a bulb syringe with the solution, insert the tip into your nostril, and squeeze gently. [employer] your nose. · Put a hot, wet towel or a warm gel pack on your face 3 or 4 times a day for 5 to 10 minutes each time. · Try a decongestant nasal spray like oxymetazoline (Afrin). Do not use it for more than 3 days in a row. Using it for more than 3 days can make your congestion worse. When should you call for help? Call your doctor now or seek immediate medical care if:    · You have new or worse swelling or redness in your face or around your eyes.     · You have a new or higher fever. Watch closely for changes in your health, and be sure to contact your doctor if:    · You have new or worse facial pain.     · The mucus from your nose becomes thicker (like pus) or has new blood in it.     · You are not getting better as expected. Where can you learn more? Go to https://Sportilia.Meme. org and sign in to your [employer] account. Enter Q045 in the [facility] box to learn more about \"Sinusitis: Care Instructions. \"     If you do not have an account, please click on the \"Sign Up Now\" link. Current as of: December 2, 2020               Content Version: 13.0  © 2006-2021 Healthwise, Incorporated. Care instructions adapted under license by [facility] [facility] ([facility]). If you have questions about a medical condition or this instruction, always ask your healthcare professional. [place] [address] any warranty or liability for your use of this information. Patient Education        Bronchitis: Care Instructions  Your Care Instructions     Bronchitis is inflammation of the bronchial tubes, which carry air to the lungs.  The tubes swell and produce mucus, or Watch closely for changes in your health, and be sure to contact your doctor if:    · You cough more deeply or more often, especially if you notice more mucus or a change in the color of your mucus.     · You are not getting better as expected. Where can you learn more? Go to https://chpepiceweb.Linkpass. org and sign in to your jellyfish account. Enter H333 in the [facility] box to learn more about \"Bronchitis: Care Instructions. \"     If you do not have an account, please click on the \"Sign Up Now\" link. Current as of: July 6, 2021               Content Version: 13.0  © 4055-9721 Healthwise, Incorporated. Care instructions adapted under license by [facility] ([facility]). If you have questions about a medical condition or this instruction, always ask your healthcare professional. [address] any warranty or liability for your use of this information.

## 2021-12-13 NOTE — PROGRESS NOTES
[address] [place] [address]  Dept: [phone]  Dept Fax: [phone]: [phone]        CHIEF COMPLAINT       Chief Complaint   Patient presents with    Cough     sinus pressure. EA. x1 week. Nurses Notes reviewed and I agree except as noted in the HPI. HISTORY OF PRESENT ILLNESS   Fabio Milan is a 35 y.o. female who presents to [facility] today (12/13/2021) for evaluation of:   Sinus Problem  This is a new problem. The current episode started in the past 7 days (12/6/21). The problem is unchanged. Maximum temperature: tmax 99.3. Associated symptoms include congestion, coughing, ear pain (bilateral), headaches (slight) and shortness of breath. Treatments tried: robitussin, ibuprofen. The treatment provided mild relief. Denies any known ill contacts. Denies hx of asthma or COPD but states she was told in the past she may be developing COPD. Pt is a smoker. REVIEW OF SYSTEMS     Review of Systems   Constitutional: Positive for fatigue and fever. Negative for appetite change. HENT: Positive for congestion and ear pain (bilateral). Respiratory: Positive for cough, shortness of breath and wheezing. Negative for chest tightness. Cardiovascular: Negative for chest pain and palpitations. Gastrointestinal: Negative for diarrhea, nausea and vomiting. Musculoskeletal: Negative for myalgias. Neurological: Positive for headaches (slight).        PAST MEDICAL HISTORY         Diagnosis Date    Atrophic vaginitis 3/10/2016    Chronic fatigue     Constipation     Endometriosis     h/o endometriosis for which she had a hysterectomy    Genital herpes     Hyperlipidemia     Migraine     migraine cephalgia    Neurocardiogenic syncope     Ovarian cyst     Pelvic inflammatory disease (PID)     Sinusitis, chronic     chronic sinusitis probably related to tobacco abuse    Tobacco abuse     Vitamin D deficiency        SURGICAL HISTORY     Patient  has a past surgical history that includes silvino and bso (cervix removed) (2006); Appendectomy; [place] tooth extraction; laparoscopy; and other surgical history (2016). CURRENT MEDICATIONS       Outpatient Medications Prior to Visit   Medication Sig Dispense Refill    simvastatin (ZOCOR) 20 MG tablet TAKE ONE TABLET BY MOUTH ONCE NIGHTLY 30 tablet 0    pantoprazole (PROTONIX) 40 MG tablet Take 1 tablet by mouth 2 times daily 180 tablet 1    citalopram (CELEXA) 40 MG tablet Take 1 tablet by mouth daily 90 tablet 1    montelukast (SINGULAIR) 10 MG tablet TAKE ONE TABLET BY MOUTH EVERY EVENING 90 tablet 1    metoprolol tartrate (LOPRESSOR) 25 MG tablet TAKE ONE TABLET BY MOUTH TWICE A  tablet 1    fludrocortisone (FLORINEF) 0.1 MG tablet Take 1 tablet by mouth daily 90 tablet 1    midodrine (PROAMATINE) 5 MG tablet Take 1 tablet by mouth 3 times daily 270 tablet 1    levocetirizine (XYZAL) 5 MG tablet Take 1 tablet by mouth daily 90 tablet 1    traZODone (DESYREL) 50 MG tablet Take 1 tablet by mouth nightly as needed for Sleep 90 tablet 1    albuterol sulfate HFA (PROVENTIL HFA) 108 (90 Base) MCG/ACT inhaler Inhale 2 puffs into the lungs every 4 hours as needed for Wheezing 1 Inhaler 0    oxybutynin (DITROPAN XL) 10 MG extended release tablet Take 1 tablet by mouth daily 90 tablet 3    varenicline (CHANTIX) 0.5 MG tablet 0.5mg DAILY for 3 days followed by 0.5mg TWICE DAILY for 4 days followed by 1mg TWICE DAILY 57 tablet 0    varenicline (CHANTIX) 1 MG tablet Take 1 tablet by mouth 2 times daily 60 tablet 5    diphenhydrAMINE (BENADRYL) 25 MG tablet Take 25 mg by mouth nightly as needed for Itching      vitamin D (CHOLECALCIFEROL) 1000 UNIT TABS tablet Take 2 tablets by mouth daily. gummies      Multiple Vitamins-Minerals (MULTIVITAMIN PO) Take 1 tablet by mouth daily.       BLACK COHOSH ROOT Take expiratory wheezing noted bilaterally. Pulse ox 90-92% on room air at rest, 88-91% with ambulation in hallway. Pulse ox increases to 96% with deep breaths. Pt denies chest pain or pressure both at rest and with ambulation. Musculoskeletal:      Cervical back: Normal range of motion and neck supple. Lymphadenopathy:      Cervical: No cervical adenopathy. Skin:     General: Skin is warm and dry. Capillary Refill: Capillary refill takes less than 2 seconds. Neurological:      General: No focal deficit present. Mental Status: She is alert. DIAGNOSTIC RESULTS   Labs:No results found for this visit on 12/13/21. IMAGING:    EXAMINATION:   TWO XRAY VIEWS OF THE CHEST       12/13/2021 10:28 am       COMPARISON:   08/31/2020       HISTORY:   ORDERING SYSTEM PROVIDED HISTORY: Cough   TECHNOLOGIST PROVIDED HISTORY: Cough, SOB, and Fatigue since 12/6/21.  Covid   test pending. Reason for Exam: Cough; SOB, fatigue 1 week       FINDINGS:   The lungs are without acute focal process.  No effusion or pneumothorax. The   cardiomediastinal silhouette is normal.  The osseous structures are intact   without acute process.           Impression   Negative chest         CLINICAL COURSE:     Vitals:    12/13/21 0929   BP: 138/88   Site: Left Upper Arm   Position: Sitting   Cuff Size: Large Adult   Pulse: 88   Temp: 97.6 °F (36.4 °C)   TempSrc: Tympanic   SpO2: 92%   Weight: 132 lb (59.9 kg)           PROCEDURES:  None  FINAL IMPRESSION      1. Bronchitis    2. Acute non-recurrent pansinusitis    3. Person under investigation for COVID-19    4. Cough    5. SOB (shortness of breath)    6. Fatigue, unspecified type    7. Wheezing         DISPOSITION/PLAN     CXR negative. Will start pt on zpak course today, tessalon for cough PRN, and albuterol inhaler for wheezing PRN. Encouraged pt to  pulse oximeter and monitor oxygen level; if below 90% consistently, pt is to go to ER.  Covid testing collected and quarantine guidelines reviewed; will notify as soon as results are available. Discussed supportive measures for symptom relief and educated pt on s/s that warrant return to clinic. Encouraged to return to clinic should symptoms worsen or any concerns arise. The use, risks, benefits, and potential side effects of prescribed and/or recommended medications were discussed. All questions were answered and the patient/caregiver voiced understanding. Patient Instructions     Will notify you of covid test result as soon as available. You should isolate at home in an area away from family. If you must be around family members, please wear a mask. Quarantine at home until result is available. This means do not go to work/school, attend family gatherings, or invite others to your home until you know your test results. A work/school note will be provided for you. Chest xray does not show any acute findings at this time. Start zpak today. Use albuterol inhaler with spacer every 4-6 hours as needed for wheezing. Tessalon as needed for cough. Can use mucinex DM as well for cough and congestion. Increase fluids to help thin secretions. Sinus rinses. Cool mist humidifier at bedside at night. Monitor your oxygenation with a pulse oximeter at home. Take several deep breaths periodically to help open your lungs. If your oxygen level is consistently below 90%, please go to ER. Patient Education        Learning About Using a Pulse Oximeter  What is a pulse oximeter? A pulse oximeter is a device that checks to see how much oxygen your blood is carrying. Usually a small clip is put on the end of your finger. (Sometimes it's put on your toe or earlobe.) The device shines a light beam through the skin. It estimates your oxygen level by measuring the percentage of your blood that's carrying oxygen. Your oxygen level (or oxygen saturation, SpO2) shows on the display screen.   Pulse oximeters are used in doctors' offices and hospitals. Your doctor may think it's a good idea to use one at home. This may be the case for people who have a condition that affects their oxygen levels. Examples include people who have long-term heart or lung problems or an infection like COVID-19. Choose a device that has been approved to give accurate readings. Talk to your doctor if you want help choosing one. Why is it used? Usually, low blood oxygen levels cause symptoms like fatigue or shortness of breath. But with some health problems, you may not have symptoms from low blood oxygen. Your doctor may suggest checking your oxygen at different times. This can help you know when you need medical attention even if you don't have symptoms. How do you use a pulse oximeter? Turn on the pulse oximeter. (Check that it has batteries.) Clip it on the end of a finger. Your nail should be facing up. You'll see the results in a few seconds. The device gives two results: your blood oxygen level (SpO2) and your pulse rate ([place]). Your doctor can help you know what numbers are normal for you. The device may not show any results if you have cold hands or you wear nail polish or artificial nails. Warm your hand, or remove the nail polish or nail. Or try a different finger. Your doctor may suggest checking your oxygen level at different times, during exercise, or anytime your symptoms get worse. Keep a record of your levels in case you need to show it to your doctor. When should you call for help? Your doctor probably told you what numbers to watch for when you use your pulse oximeter. If not, here is some guidance. Call your doctor if:    · Your blood oxygen level (SpO2) drops below 95%. This is true even if the number only drops when you're active. If you have certain health problems, like COPD, your oxygen level may always be lower than 95%. Ask your doctor what oxygen number you should expect when using your pulse oximeter.  Find out which number is a sign that you should call for help. Watch closely for changes in your health, and be sure to contact your doctor if:    · Your symptoms get worse.     · You are not getting better as expected. Follow-up care is a key part of your treatment and safety. Be sure to make and go to all appointments, and call your doctor if you are having problems. It's also a good idea to know your test results and keep a list of the medicines you take. Current as of: July 6, 2021               Content Version: 13.0  © 2006-2021 [employer]. Care instructions adapted under license by [facility] ([facility]). If you have questions about a medical condition or this instruction, always ask your healthcare professional. [address] any warranty or liability for your use of this information. Patient Education        Sinusitis: Care Instructions  Your Care Instructions     Sinusitis is an infection of the lining of the sinus cavities in your head. Sinusitis often follows a cold. It causes pain and pressure in your head and face. In most cases, sinusitis gets better on its own in 1 to 2 weeks. But some mild symptoms may last for several weeks. Sometimes antibiotics are needed. Follow-up care is a key part of your treatment and safety. Be sure to make and go to all appointments, and call your doctor if you are having problems. It's also a good idea to know your test results and keep a list of the medicines you take. How can you care for yourself at home? · Take an over-the-counter pain medicine, such as acetaminophen (Tylenol), ibuprofen (Advil, Motrin), or naproxen (Aleve). Read and follow all instructions on the label. · If the doctor prescribed antibiotics, take them as directed. Do not stop taking them just because you feel better. You need to take the full course of antibiotics. · Be careful when taking over-the-counter cold or flu medicines and Tylenol at the same time.  Many of these medicines have acetaminophen, which is Tylenol. Read the labels to make sure that you are not taking more than the recommended dose. Too much acetaminophen (Tylenol) can be harmful. · Breathe warm, moist air from a steamy shower, a hot bath, or a sink filled with hot water. Avoid cold, dry air. Using a humidifier in your home may help. Follow the directions for cleaning the machine. · Use saline (saltwater) nasal washes. This can help keep your nasal passages open and wash out mucus and bacteria. You can buy saline nose drops at a grocery store or [employer]. Or you can make your own at home by adding 1 teaspoon of salt and 1 teaspoon of baking soda to 2 cups of distilled water. If you make your own, fill a bulb syringe with the solution, insert the tip into your nostril, and squeeze gently. Flonnie Carrier your nose. · Put a hot, wet towel or a warm gel pack on your face 3 or 4 times a day for 5 to 10 minutes each time. · Try a decongestant nasal spray like oxymetazoline (Afrin). Do not use it for more than 3 days in a row. Using it for more than 3 days can make your congestion worse. When should you call for help? Call your doctor now or seek immediate medical care if:    · You have new or worse swelling or redness in your face or around your eyes.     · You have a new or higher fever. Watch closely for changes in your health, and be sure to contact your doctor if:    · You have new or worse facial pain.     · The mucus from your nose becomes thicker (like pus) or has new blood in it.     · You are not getting better as expected. Where can you learn more? Go to https://[email or web address] [email or web address] org and sign in to your [employer] account. Enter E061 in the Tripwire box to learn more about \"Sinusitis: Care Instructions. \"     If you do not have an account, please click on the \"Sign Up Now\" link. Current as of: December 2, 2020               Content Version: 13.0  © 3309-2185 [employer]. Care instructions adapted under license by [facility] ([facility]). If you have questions about a medical condition or this instruction, always ask your healthcare professional. [address] any warranty or liability for your use of this information. Patient Education        Bronchitis: Care Instructions  Your Care Instructions     Bronchitis is inflammation of the bronchial tubes, which carry air to the lungs. The tubes swell and produce mucus, or phlegm. The mucus and inflamed bronchial tubes make you cough. You may have trouble breathing. Most cases of bronchitis are caused by viruses like those that cause colds. Antibiotics usually do not help and they may be harmful. Bronchitis usually develops rapidly and lasts about 2 to 3 weeks in otherwise healthy people. Follow-up care is a key part of your treatment and safety. Be sure to make and go to all appointments, and call your doctor if you are having problems. It's also a good idea to know your test results and keep a list of the medicines you take. How can you care for yourself at home? · Take all medicines exactly as prescribed. Call your doctor if you think you are having a problem with your medicine. · Get some extra rest.  · Take an over-the-counter pain medicine, such as acetaminophen (Tylenol), ibuprofen (Advil, Motrin), or naproxen (Aleve) to reduce fever and relieve body aches. Read and follow all instructions on the label. · Do not take two or more pain medicines at the same time unless the doctor told you to. Many pain medicines have acetaminophen, which is Tylenol. Too much acetaminophen (Tylenol) can be harmful. · Take an over-the-counter cough medicine to help quiet a dry, hacking cough so that you can sleep. Avoid cough medicines that have more than one active ingredient. Read and follow all instructions on the label. · Do not smoke. Smoking can make bronchitis worse.  If you need help quitting, talk to your doctor about stop-smoking programs and medicines. These can increase your chances of quitting for good. When should you call for help? Call 911 anytime you think you may need emergency care. For example, call if:    · You have severe trouble breathing. Call your doctor now or seek immediate medical care if:    · You have new or worse trouble breathing.     · You cough up dark brown or bloody mucus (sputum).     · You have a new or higher fever.     · You have a new rash. Watch closely for changes in your health, and be sure to contact your doctor if:    · You cough more deeply or more often, especially if you notice more mucus or a change in the color of your mucus.     · You are not getting better as expected. Where can you learn more? Go to https://Syapse.[email or web address] org and sign in to your [employer] account. Enter H333 in the [employer] box to learn more about \"Bronchitis: Care Instructions. \"     If you do not have an account, please click on the \"Sign Up Now\" link. Current as of: July 6, 2021               Content Version: 13.0  © 6035-3177 [employer]. Care instructions adapted under license by [facility] ([facility]). If you have questions about a medical condition or this instruction, always ask your healthcare professional. [place] [address] any warranty or liability for your use of this information. Orders Placed This Encounter   Procedures    XR CHEST STANDARD (2 VW)     Standing Status:   Future     Number of Occurrences:   1     Standing Expiration Date:   1/13/2022     Order Specific Question:   Reason for exam:     Answer:   Cough, SOB, and Fatigue since 12/6/21. Covid test pending.     COVID-19     Standing Status:   Future     Number of Occurrences:   1     Standing Expiration Date:   1/13/2022     Scheduling Instructions:      1) Due to current limited availability of the COVID-19 test, tests will be prioritized based on responses to questions above. Testing may be delayed due to volume. 2) Print and instruct patient to adhere to CDC home isolation program. (Link Above)              3) Set up or refer patient for a monitoring program.              4) Have patient sign up for and leverage MyChart (if not previously done). Order Specific Question:   Is this test for diagnosis or screening? Answer:   Diagnosis of ill patient     Order Specific Question:   Symptomatic for COVID-19 as defined by CDC? Answer:   Yes     Order Specific Question:   Date of Symptom Onset     Answer:   12/6/2021     Order Specific Question:   Hospitalized for COVID-19? Answer:   No     Order Specific Question:   Admitted to ICU for COVID-19? Answer:   No     Order Specific Question:   Employed in healthcare setting? Answer:   No     Order Specific Question:   Resident in a congregate (group) care setting? Answer:   No     Order Specific Question:   Pregnant? Answer:   No     Order Specific Question:   Previously tested for COVID-19?      Answer:   Yes     Outpatient Encounter Medications as of 12/13/2021   Medication Sig Dispense Refill    benzonatate (TESSALON) 100 MG capsule Take 1 capsule by mouth 3 times daily as needed for Cough 30 capsule 0    albuterol sulfate HFA (VENTOLIN HFA) 108 (90 Base) MCG/ACT inhaler Inhale 2 puffs into the lungs 4 times daily as needed for Wheezing 18 g 0    Spacer/Aero-Holding Chambers MARIA DE JESUS 1 Device by Does not apply route as needed (To be used with albuterol inhaler) 1 each 0    azithromycin (ZITHROMAX) 250 MG tablet Take 1 tablet by mouth See Admin Instructions for 5 days 500mg on day 1 followed by 250mg on days 2 - 5 6 tablet 0    simvastatin (ZOCOR) 20 MG tablet TAKE ONE TABLET BY MOUTH ONCE NIGHTLY 30 tablet 0    pantoprazole (PROTONIX) 40 MG tablet Take 1 tablet by mouth 2 times daily 180 tablet 1    citalopram (CELEXA) 40 MG tablet Take 1 tablet by mouth daily 90 tablet 1    montelukast (SINGULAIR) 10 MG tablet TAKE ONE TABLET BY MOUTH EVERY EVENING 90 tablet 1    metoprolol tartrate (LOPRESSOR) 25 MG tablet TAKE ONE TABLET BY MOUTH TWICE A  tablet 1    fludrocortisone (FLORINEF) 0.1 MG tablet Take 1 tablet by mouth daily 90 tablet 1    midodrine (PROAMATINE) 5 MG tablet Take 1 tablet by mouth 3 times daily 270 tablet 1    levocetirizine (XYZAL) 5 MG tablet Take 1 tablet by mouth daily 90 tablet 1    traZODone (DESYREL) 50 MG tablet Take 1 tablet by mouth nightly as needed for Sleep 90 tablet 1    albuterol sulfate HFA (PROVENTIL HFA) 108 (90 Base) MCG/ACT inhaler Inhale 2 puffs into the lungs every 4 hours as needed for Wheezing 1 Inhaler 0    oxybutynin (DITROPAN XL) 10 MG extended release tablet Take 1 tablet by mouth daily 90 tablet 3    varenicline (CHANTIX) 0.5 MG tablet 0.5mg DAILY for 3 days followed by 0.5mg TWICE DAILY for 4 days followed by 1mg TWICE DAILY 57 tablet 0    varenicline (CHANTIX) 1 MG tablet Take 1 tablet by mouth 2 times daily 60 tablet 5    diphenhydrAMINE (BENADRYL) 25 MG tablet Take 25 mg by mouth nightly as needed for Itching      vitamin D (CHOLECALCIFEROL) 1000 UNIT TABS tablet Take 2 tablets by mouth daily. gummies      Multiple Vitamins-Minerals (MULTIVITAMIN PO) Take 1 tablet by mouth daily.  BLACK COHOSH ROOT Take 600 mg by mouth 2 times daily as needed. FOR HOT FLASHES       No facility-administered encounter medications on file as of 12/13/2021. Return if symptoms worsen or fail to improve.                 Electronically signed by OSEI Howell CNP on 12/13/2021 at 11:20 AM

## 2021-12-13 NOTE — LETTER
[address] [facility] A department of [place] [address]  Phone: [phone]  Fax: [phone]    OSEI Poe CNP        December 13, 2021     Patient: Chi Davidson   YOB: 1980   Date of Visit: 12/13/2021       To Whom it May Concern:    Yazmin Evans was seen in my clinic on 12/13/2021. Pt states missed work 12/10/21 as well. She may return to work after a negative covid test result (results expected in appx 1-3 days) and marked symptom improvement. Pt should be fever free for 24 hours without medication. If you have any questions or concerns, please don't hesitate to call.     Sincerely,         OSEI Poe CNP

## 2021-12-13 NOTE — LETTER
[address] [facility] A department of [facility], [place] [address]  Phone: [phone]  Fax: [phone]    OSEI Gonzales CNP        December 13, 2021     Patient: Fabio Milan   YOB: 1980   Date of Visit: 12/13/2021       To Whom it May Concern:    Ej Lyles was seen in my clinic on 12/13/2021. She may return to work after a negative covid test result (results expected in appx 1-3 days) and marked symptom improvement. Pt should be fever free for 24 hours without medication. If you have any questions or concerns, please don't hesitate to call.     Sincerely,         OSEI Gonzales CNP

## 2021-12-14 LAB
SARS-COV-2: NORMAL
SARS-COV-2: NOT DETECTED
SOURCE: NORMAL

## 2022-01-05 ENCOUNTER — OFFICE VISIT (OUTPATIENT)
Dept: FAMILY MEDICINE CLINIC | Age: 42
End: 2022-01-05
Payer: COMMERCIAL

## 2022-01-05 DIAGNOSIS — F41.8 ANXIETY WITH DEPRESSION: ICD-10-CM

## 2022-01-05 DIAGNOSIS — J40 BRONCHITIS: Primary | ICD-10-CM

## 2022-01-05 PROCEDURE — 99213 OFFICE O/P EST LOW 20 MIN: CPT | Performed by: STUDENT IN AN ORGANIZED HEALTH CARE EDUCATION/TRAINING PROGRAM

## 2022-01-05 RX ORDER — PREDNISONE 20 MG/1
TABLET ORAL
Qty: 15 TABLET | Refills: 0 | Status: SHIPPED | OUTPATIENT
Start: 2022-01-05 | End: 2022-01-15

## 2022-01-05 RX ORDER — BUSPIRONE HYDROCHLORIDE 5 MG/1
TABLET ORAL
Qty: 60 TABLET | Refills: 2 | Status: SHIPPED | OUTPATIENT
Start: 2022-01-05 | End: 2022-08-05 | Stop reason: SDUPTHER

## 2022-01-05 ASSESSMENT — ENCOUNTER SYMPTOMS
WHEEZING: 1
CONSTIPATION: 0
EYE REDNESS: 0
ABDOMINAL PAIN: 0
SHORTNESS OF BREATH: 0
COUGH: 1
NAUSEA: 0
EYE PAIN: 0
RHINORRHEA: 0
CHEST TIGHTNESS: 1
DIARRHEA: 0
VOMITING: 0

## 2022-01-05 NOTE — PROGRESS NOTES
Adam Umanzor (:  1980) is a 35 y.o. female,Established patient:, here for evaluation of the following chief complaint(s): Cough (for 2 weeks covid (-) 2 weeks ago)         ASSESSMENT/PLAN:  1. Bronchitis  -     predniSONE (DELTASONE) 20 MG tablet; Take 1 tablet by mouth 2 times daily for 5 days, THEN 1 tablet daily for 5 days. , Disp-15 tablet, R-[de-identified]ormal  38-year-old female presents the office via virtual visit secondary to COVID-19 pandemic for concern of 2-week history of ongoing cough and wheezing. Patient is status post treatment with a azithromycin 2 weeks ago. Chest x-ray done 2 weeks ago negative. COVID-19 testing done 2 weeks ago negative. Patient symptoms are most likely residual inflammation from previously treated bacterial bronchitis. Will treat with a 10-day course of prednisone to reduce lung inflammation. Patient should start using her albuterol 2-3 times per day for wheezing and shortness of breath. Continue over-the-counter use of Mucinex DM for mucolytic activity. Patient verbalizes understanding of plan and is agreeable to above. No need for additional testing at this time. Return if symptoms worsen or fail to improve. SUBJECTIVE/OBJECTIVE:  HPI   38-year-old female presents the office via virtual visit secondary to the COVID-19 pandemic for concern of 2-week history of ongoing cough. Patient states approximately 2 weeks ago she was seen at the doctor's office and told she had bronchitis. She was given a prescription for azithromycin and cough medication. Patient also had chest x-ray done at that time, which was negative. Patient was also tested for COVID-19 at that time, which was negative. Patient states after the antibiotic she did seem to improve some but she cannot get rid of this dry hacking cough. Patient denies any fevers, chills, congestion, runny nose.   She continues to use either Mucinex DM or Robitussin over-the-counter for mucolytic activity which does seem to also help. Patient is wondering if there is anything she can do to help get rid of this cough completely. Review of Systems   Constitutional: Negative for fatigue and fever. HENT: Negative for congestion, postnasal drip and rhinorrhea. Eyes: Negative for pain and redness. Respiratory: Positive for cough, chest tightness and wheezing. Negative for shortness of breath. Cardiovascular: Negative for chest pain and palpitations. Gastrointestinal: Negative for abdominal pain, constipation, diarrhea, nausea and vomiting. Genitourinary: Negative for difficulty urinating and dysuria. Skin: Negative for pallor and rash. Neurological: Negative for dizziness and headaches. Hematological: Does not bruise/bleed easily. Psychiatric/Behavioral: Negative for dysphoric mood. The patient is not nervous/anxious. No flowsheet data found.      Physical Exam    [INSTRUCTIONS:  \"[x]\" Indicates a positive item  \"[]\" Indicates a negative item  -- DELETE ALL ITEMS NOT EXAMINED]    Constitutional: [x] Appears well-developed and well-nourished [x] No apparent distress      [] Abnormal -     Mental status: [x] Alert and awake  [x] Oriented to person/place/time [x] Able to follow commands    [] Abnormal -     Eyes:   EOM    [x]  Normal    [] Abnormal -   Sclera  [x]  Normal    [] Abnormal -          Discharge [x]  None visible   [] Abnormal -     HENT: [x] Normocephalic, atraumatic  [] Abnormal -   [x] Mouth/Throat: Mucous membranes are moist    External Ears [x] Normal  [] Abnormal -    Neck: [x] No visualized mass [] Abnormal -     Pulmonary/Chest: [x] Respiratory effort normal   [x] No visualized signs of difficulty breathing or respiratory distress        [x] Abnormal -dry hacking cough on exam.     Neurological:        [x] No Facial Asymmetry (Cranial nerve 7 motor function) (limited exam due to video visit)          [x] No gaze palsy        [] Abnormal -          Skin:        [x] No significant exanthematous lesions or discoloration noted on facial skin         [] Abnormal -            Psychiatric:       [x] Normal Affect [] Abnormal -        [x] No Hallucinations    Other pertinent observable physical exam findings:-            Jalil Truong, was evaluated through a synchronous (real-time) audio-video encounter. The patient (or guardian if applicable) is aware that this is a billable service. Verbal consent to proceed has been obtained within the past 12 months. The visit was conducted pursuant to the emergency declaration under the [address] [place] [address] and the [employer] General Act. Patient identification was verified, and a caregiver was present when appropriate. The patient was located in a state where the provider was credentialed to provide care. An electronic signature was used to authenticate this note.     --Theesdras Easley, DO

## 2022-01-05 NOTE — TELEPHONE ENCOUNTER
Last Appt:  9/15/2021  Next Appt:   3/18/2022  Med verified in [facility] [address]

## 2022-01-17 ENCOUNTER — TELEPHONE (OUTPATIENT)
Dept: FAMILY MEDICINE CLINIC | Age: 42
End: 2022-01-17

## 2022-01-25 ENCOUNTER — HOSPITAL ENCOUNTER (OUTPATIENT)
Dept: GENERAL RADIOLOGY | Age: 42
Discharge: HOME | End: 2022-01-27
Payer: COMMERCIAL

## 2022-01-25 ENCOUNTER — OFFICE VISIT (OUTPATIENT)
Dept: FAMILY MEDICINE CLINIC | Age: 42
End: 2022-01-25
Payer: COMMERCIAL

## 2022-01-25 VITALS
HEART RATE: 80 BPM | DIASTOLIC BLOOD PRESSURE: 70 MMHG | HEIGHT: 62 IN | SYSTOLIC BLOOD PRESSURE: 122 MMHG | WEIGHT: 137 LBS | BODY MASS INDEX: 25.21 KG/M2

## 2022-01-25 DIAGNOSIS — M79.602 PAIN OF LEFT UPPER EXTREMITY: ICD-10-CM

## 2022-01-25 DIAGNOSIS — S52.502A CLOSED FRACTURE OF DISTAL END OF LEFT RADIUS, UNSPECIFIED FRACTURE MORPHOLOGY, INITIAL ENCOUNTER: Primary | ICD-10-CM

## 2022-01-25 DIAGNOSIS — M25.532 LEFT WRIST PAIN: ICD-10-CM

## 2022-01-25 PROCEDURE — 73110 X-RAY EXAM OF WRIST: CPT

## 2022-01-25 PROCEDURE — 99214 OFFICE O/P EST MOD 30 MIN: CPT | Performed by: PHYSICIAN ASSISTANT

## 2022-01-25 PROCEDURE — 73090 X-RAY EXAM OF FOREARM: CPT

## 2022-01-25 PROCEDURE — MISC295 DJO ARM SLING WITH SWATHE: Performed by: PHYSICIAN ASSISTANT

## 2022-01-25 ASSESSMENT — PATIENT HEALTH QUESTIONNAIRE - PHQ9
SUM OF ALL RESPONSES TO PHQ QUESTIONS 1-9: 1
SUM OF ALL RESPONSES TO PHQ9 QUESTIONS 1 & 2: 1
SUM OF ALL RESPONSES TO PHQ QUESTIONS 1-9: 1
1. LITTLE INTEREST OR PLEASURE IN DOING THINGS: 1
2. FEELING DOWN, DEPRESSED OR HOPELESS: 0
SUM OF ALL RESPONSES TO PHQ QUESTIONS 1-9: 1
SUM OF ALL RESPONSES TO PHQ QUESTIONS 1-9: 1

## 2022-01-25 ASSESSMENT — ENCOUNTER SYMPTOMS
GASTROINTESTINAL NEGATIVE: 1
RESPIRATORY NEGATIVE: 1

## 2022-01-25 NOTE — LETTER
[facility] A department of [place] [address]  Phone: [phone]  Fax: [phone]    Talita Morfin        January 25, 2022     Patient: Adam Umanzor   YOB: 1980   Date of Visit: 1/25/2022       To Whom It May Concern: It is my medical opinion that [place] should remain off work thru 01/27/2022. If you have any questions or concerns, please don't hesitate to call.     Sincerely,        PIERO Morfin/Virgilio

## 2022-01-25 NOTE — PROGRESS NOTES
Subjective:      Patient ID: Joan Reed is a 35 y.o. female. Wrist Pain   The pain is present in the left fingers and left wrist. This is a new problem. The current episode started yesterday. There has been a history of trauma (fell 1/24/2022 in parking lot at [place]). The quality of the pain is described as aching. Associated symptoms include a limited range of motion, numbness, stiffness and tingling. Pertinent negatives include no inability to bear weight. The symptoms are aggravated by activity. She has tried NSAIDS and heat for the symptoms. The treatment provided no relief. Patient is right hand dominant. Review of Systems   Constitutional: Negative. HENT: Negative. Respiratory: Negative. Cardiovascular: Negative. Gastrointestinal: Negative. Musculoskeletal: Positive for arthralgias and stiffness. Neurological: Positive for tingling and numbness. Past Medical History:   Diagnosis Date    Atrophic vaginitis 3/10/2016    Chronic fatigue     Constipation     Endometriosis     h/o endometriosis for which she had a hysterectomy    Genital herpes     Hyperlipidemia     Migraine     migraine cephalgia    Neurocardiogenic syncope     Ovarian cyst     Pelvic inflammatory disease (PID)     Sinusitis, chronic     chronic sinusitis probably related to tobacco abuse    Tobacco abuse     Vitamin D deficiency      Past Surgical History:   Procedure Laterality Date    APPENDECTOMY      LAPAROSCOPY      X3 before the hysterectomy    OTHER SURGICAL HISTORY  2016    lump removed lower right leg    KRISTIN AND BSO  2006    total with bilateral salpingo-oophorectomy    WISDOM TOOTH EXTRACTION       Social History     Tobacco Use    Smoking status: Current Every Day Smoker     Packs/day: 1.00     Years: 15.00     Pack years: 15.00     Types: Cigarettes     Start date: 1/1/1998    Smokeless tobacco: Never Used    Tobacco comment: Will see PCP PRN when ready to quit. Vaping Use    Vaping Use: Never used   Substance Use Topics    Alcohol use: Yes     Alcohol/week: 0.0 standard drinks     Comment: RARE    Drug use: No     Types: Cocaine     Comment: HAS BEEN OFF CRACK COCAINE FOR SEVERAL YEARS       Objective:   Physical Exam  HENT:      Head: Normocephalic. Cardiovascular:      Rate and Rhythm: Normal rate. Pulmonary:      Effort: Pulmonary effort is normal.      Breath sounds: Normal breath sounds. Musculoskeletal:      Left forearm: Edema and tenderness present. Arms:    Skin:     General: Skin is warm. Neurological:      General: No focal deficit present. Mental Status: She is alert. Psychiatric:         Mood and Affect: Mood normal.         XR RADIUS ULNA LEFT (2 VIEWS)    Result Date: 1/25/2022  EXAMINATION: 3 XRAY VIEWS OF THE LEFT WRIST; TWO XRAY VIEWS OF THE LEFT FOREARM 1/25/2022 9:22 am COMPARISON: None. HISTORY: ORDERING SYSTEM PROVIDED HISTORY: Left wrist pain TECHNOLOGIST PROVIDED HISTORY: Reason for Exam: Left wrist and forearm pain following fall yesterday FINDINGS: There is subtle cortical buckling at the radial aspect of the distal radial metaphysis, possibly indicating a nondisplaced impaction fracture. No visible intra-articular extension. No other acute fracture or dislocation. No bony erosion. Questionable nondisplaced impaction fracture at the radial aspect of the distal radial metaphysis. Otherwise no acute osseous abnormality in the left forearm or wrist.     XR WRIST LEFT (MIN 3 VIEWS)    Result Date: 1/25/2022  EXAMINATION: 3 XRAY VIEWS OF THE LEFT WRIST; TWO XRAY VIEWS OF THE LEFT FOREARM 1/25/2022 9:22 am COMPARISON: None.  HISTORY: ORDERING SYSTEM PROVIDED HISTORY: Left wrist pain TECHNOLOGIST PROVIDED HISTORY: Reason for Exam: Left wrist and forearm pain following fall yesterday FINDINGS: There is subtle cortical buckling at the radial aspect of the distal radial metaphysis, possibly indicating a nondisplaced impaction fracture. No visible intra-articular extension. No other acute fracture or dislocation. No bony erosion. Questionable nondisplaced impaction fracture at the radial aspect of the distal radial metaphysis. Otherwise no acute osseous abnormality in the left forearm or wrist.       Assessment:      1. Closed fracture of distal end of left radius, unspecified fracture morphology, initial encounter    2. Left wrist pain    3. Pain of left upper extremity          Plan:      Short arm splint applied. Referral to orthopedics. Supportive care. Tylenol PRN pain. Work note provided. Questions answered for patient. Follow-up PRN. Procedures    DJO ARM SLING WITH SWATHE     Patient was supplied a Simple Arm Sling. This retail item was supplied to provide functional support and assist in protecting the affected area. Verbal and written instructions for the use of and application of this item were provided. The patient was educated and fit by a healthcare professional with expert knowledge and specialization in brace application. They were instructed to contact the office immediately should the equipment result in increased pain, decreased sensation, increased swelling or worsening of the condition.                PIERO Gandara

## 2022-01-27 ENCOUNTER — OFFICE VISIT (OUTPATIENT)
Dept: ORTHOPEDIC SURGERY | Age: 42
End: 2022-01-27
Payer: COMMERCIAL

## 2022-01-27 ENCOUNTER — HOSPITAL ENCOUNTER (OUTPATIENT)
Dept: CT IMAGING | Age: 42
Discharge: HOME | End: 2022-01-29
Payer: COMMERCIAL

## 2022-01-27 VITALS
SYSTOLIC BLOOD PRESSURE: 130 MMHG | DIASTOLIC BLOOD PRESSURE: 88 MMHG | HEART RATE: 85 BPM | BODY MASS INDEX: 25.21 KG/M2 | HEIGHT: 62 IN | WEIGHT: 137 LBS

## 2022-01-27 DIAGNOSIS — S52.502A CLOSED FRACTURE OF DISTAL END OF LEFT RADIUS, INITIAL ENCOUNTER: Primary | ICD-10-CM

## 2022-01-27 DIAGNOSIS — S69.91XA RIGHT WRIST INJURY, INITIAL ENCOUNTER: ICD-10-CM

## 2022-01-27 PROCEDURE — L3913 HFO W/O JOINTS CF: HCPCS | Performed by: FAMILY MEDICINE

## 2022-01-27 PROCEDURE — 99203 OFFICE O/P NEW LOW 30 MIN: CPT | Performed by: FAMILY MEDICINE

## 2022-01-27 PROCEDURE — 73200 CT UPPER EXTREMITY W/O DYE: CPT

## 2022-01-27 NOTE — PROGRESS NOTES
Sports Medicine Consultation    CHIEF COMPLAINT:  Wrist Pain (left ulna fracture/ films here)      HPI:  The patient is a 35 y.o. female who is being seen for  new patient being seen for regarding new problem of  Left wrist pain. The patient is a right hand dominant female who has had left hand/wrist pain for 3 days. As far as trauma to the hand the patient indicates slipped in a parking lot after getting her nails. The following medications/interventions have been tried: tylenol and ibu, splint and sling without benefit.      she has a past medical history of Atrophic vaginitis, Chronic fatigue, Constipation, Endometriosis, Genital herpes, Hyperlipidemia, Migraine, Neurocardiogenic syncope, Ovarian cyst, Pelvic inflammatory disease (PID), Sinusitis, chronic, Tobacco abuse, and Vitamin D deficiency. she has a past surgical history that includes silvino and bso (cervix removed) (2006); Appendectomy; Stewart tooth extraction; laparoscopy; and other surgical history (2016). family history includes Cancer (age of onset: 76) in her paternal grandfather; Depression in her mother; Diabetes in her maternal grandfather, maternal grandmother, and maternal uncle; Heart Disease in her mother; Hypertension in her mother; Migraines in her mother; Other in her father and mother; Parkinsonism in her mother; Stroke (age of onset: 62) in her mother. 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      Vaping Use    Vaping Use: Never used   Substance and Sexual Activity    Alcohol use: Yes     Alcohol/week: 0.0 standard drinks     Comment: RARE    Drug use: No     Types: Cocaine     Comment: HAS BEEN OFF CRACK COCAINE FOR SEVERAL YEARS    Sexual activity: Yes     Partners: Male     Birth control/protection: Surgical     Comment:    Other Topics Concern    Not on file   Social History Narrative    Not on file     Social Determinants of Health     Financial Resource Strain: Low Risk     Difficulty of Paying Living Expenses: Not hard at all   Food Insecurity: No Food Insecurity    Worried About Running Out of Food in the Last Year: Never true    Heron of Food in the Last Year: Never true   Transportation Needs:     Lack of Transportation (Medical): Not on file    Lack of Transportation (Non-Medical):  Not on file   Physical Activity:     Days of Exercise per Week: Not on file    Minutes of Exercise per Session: Not on file   Stress:     Feeling of Stress : Not on file   Social Connections:     Frequency of Communication with Friends and Family: Not on file    Frequency of Social Gatherings with Friends and Family: Not on file    Attends Mormonism Services: Not on file    Active Member of [address] [place] [address] or Organizations: Not on file    Attends Club or Organization Meetings: Not on file    Marital Status: Not on file   Intimate Partner Violence:     Fear of Current or Ex-Partner: Not on file    Emotionally Abused: Not on file    Physically Abused: Not on file    Sexually Abused: Not on file   Housing Stability:     Unable to Pay for Housing in the Last Year: Not on file    Number of [place] in the Last Year: Not on file    Unstable Housing in the Last Year: Not on file       Current Outpatient Medications   Medication Sig Dispense Refill    busPIRone (BUSPAR) 5 MG tablet TAKE ONE TABLET BY MOUTH TWICE A DAY 60 tablet 2    albuterol sulfate HFA (VENTOLIN HFA) 108 (90 Base) MCG/ACT inhaler Inhale 2 puffs into the lungs 4 times daily as needed for Wheezing 18 g 0    Spacer/Aero-Holding Chambers MARIA DE JESUS 1 Device by Does not apply route as needed (To be used with albuterol inhaler) 1 each 0    simvastatin (ZOCOR) 20 MG tablet TAKE ONE TABLET BY MOUTH ONCE NIGHTLY 30 tablet 0    pantoprazole (PROTONIX) 40 MG tablet Take 1 tablet by mouth 2 times daily 180 tablet 1    citalopram (CELEXA) 40 MG tablet Take 1 tablet by mouth daily 90 tablet 1    montelukast (SINGULAIR) 10 MG tablet TAKE ONE TABLET BY MOUTH EVERY EVENING 90 tablet 1    metoprolol tartrate (LOPRESSOR) 25 MG tablet TAKE ONE TABLET BY MOUTH TWICE A  tablet 1    fludrocortisone (FLORINEF) 0.1 MG tablet Take 1 tablet by mouth daily 90 tablet 1    midodrine (PROAMATINE) 5 MG tablet Take 1 tablet by mouth 3 times daily 270 tablet 1    levocetirizine (XYZAL) 5 MG tablet Take 1 tablet by mouth daily (Patient not taking: Reported on 1/27/2022) 90 tablet 1    traZODone (DESYREL) 50 MG tablet Take 1 tablet by mouth nightly as needed for Sleep 90 tablet 1    albuterol sulfate HFA (PROVENTIL HFA) 108 (90 Base) MCG/ACT inhaler Inhale 2 puffs into the lungs every 4 hours as needed for Wheezing 1 Inhaler 0    oxybutynin (DITROPAN XL) 10 MG extended release tablet Take 1 tablet by mouth daily 90 tablet 3    diphenhydrAMINE (BENADRYL) 25 MG tablet Take 25 mg by mouth nightly as needed for Itching      vitamin D (CHOLECALCIFEROL) 1000 UNIT TABS tablet Take 2 tablets by mouth daily. gummies      Multiple Vitamins-Minerals (MULTIVITAMIN PO) Take 1 tablet by mouth daily.  BLACK COHOSH ROOT Take 600 mg by mouth 2 times daily as needed. FOR HOT FLASHES       No current facility-administered medications for this visit. Allergies:  sheis allergic to amoxicillin, other, and pcn [penicillins]. ROS:  CV:  Denies chest pain; palpitations; shortness of breath; swelling of feet, ankles; and loss of consciousness.   CON: Denies fever and dizziness. ENT:  Denies hearing loss / ringing, ear infections hoarseness, and swallowing problems. RESP:  Denies chronic cough, spitting up blood, and asthma/wheezing. GI: Denies abdominal pain, change in bowel habits, nausea or vomiting, and blood in stools. :  Denies frequent urination, burning or painful urination, blood in the urine, and bladder incontinence. NEURO:  Denies headache, memory loss, sleep disturbance, and tremor or movement disorder. PHYSICAL EXAM:    SKIN:  Intact without rashes, lesions or ulcerations. No obvious deformity or swelling. EYES:  Extraocular muscles intact. MOUTH: Oral mucosa moist.  No perioral lesions. PULM:  Respirations unlabored and regular. VASC:  Capillary refill less than 2 seconds. Hand/Wrist Exam  ROM:  Full flexor and extensor tendon function intact   Finger, hand, and wrist range of motion are Reduced resulting in globally  Inspection-Deformity: yes  Palpation-Tenderness: distal radius, snuff box and ucl  There is is not thenar and is not interosseous atrophy. Strength- Reduced resulting in pain causing  NEURO: Radial, ulnar, and median nerves are intact to motor and sensory testing. Two point discrimination is less than six mm. There is not decreased sensation to light touch and pinprick in the median and ulnar nerve distribution. CTS: Tinel's test at the wrist is negative. Flexion compression test negative  Phalen's test is negative. Finkelstein's test is negative. Elbow:  Range of motion and strength about the elbow is intact. Tinel's test is negative at the elbow. Cerv:  Full pain free range of motion with a negative Spurling's test.    RADIOLOGY: No results found. XR RADIUS ULNA LEFT (2 VIEWS)    Result Date: 1/25/2022  Questionable nondisplaced impaction fracture at the radial aspect of the distal radial metaphysis.   Otherwise no acute osseous abnormality in the left forearm or wrist.     XR WRIST LEFT (MIN 3 VIEWS)    Result Date: 1/25/2022  Questionable nondisplaced impaction fracture at the radial aspect of the distal radial metaphysis. Otherwise no acute osseous abnormality in the left forearm or wrist.     CT WRIST LEFT WO CONTRAST    Result Date: 1/27/2022  Acute nondisplaced intra-articular distal left radial fracture. Posterior soft tissue swelling. IMPRESSION:     1. Closed fracture of distal end of left radius, initial encounter        PLAN:   We discussed some of the etiologies and natural histories of     ICD-10-CM    1. Closed fracture of distal end of left radius, initial encounter  S52.502A Hfo w/o joints cf     We discussed the various treatment alternatives including anti-inflammatory medications, physical therapy, injections, further imaging studies and as a last resort surgery. At this point patient has fairly significant wrist injury and I am very concerned about the swelling in her hand as the x-ray is really noncontributory to an acute fracture this just suggestion of a little bit of a ridge off the radial styloid. On top of the fact that she has snuffbox pain I would like a stat CT of that wrist to further evaluate the structures of the bone we will place her in a Exos thumb spica fracture brace discussed elevation and icing along with alternating Tylenol and ibuprofen is the safest treatment for this issue we will call her with the results of the imaging. We will give her off work until Monday where she can resume right-handed work only provided they can accommodate it. In order to assist the patient we have referred her to our retained program to see if she qualifies for that assistance during this difficult time. we will see her back in 1 week    No follow-ups on file.     Please be aware portions of this note were completed using voice recognition software and unforeseen errors may have occurred    Electronically signed by Bessie Delaney DO, FAOASM  on 1/27/22 at 10:49 AM EST Procedures    Hfo w/o joints cf     Patient was prescribed a Exos Thumb Spica Fracture brace. The left  thumb will require stabilization / immobilization from this semi-rigid / rigid orthosis to improve their function. The orthosis will assist in protecting the affected area, provide functional support and facilitate healing. The orthosis used a heating element to mold and shape the brace to provide a customizable fit for the patient. The patient was educated and fit by a healthcare professional with expert knowledge and specialization in brace application while under the direct supervision of the treating physician. Verbal and written instructions for the use of and application of this item were provided.    They were instructed to contact the office immediately should the brace result in increased pain, decreased sensation, increased swelling or worsening of the condition

## 2022-01-27 NOTE — LETTER
[facility] A department of [place] [address]  Phone: [phone]  Fax: Aasa 46, DO        January 27, 2022     Patient: Jez Bennett   YOB: 1980   Date of Visit: 1/27/2022       To Whom It May Concern: It is my medical opinion that Balta Denny may return to work on 01/31/22, no use of left wrist. Restrictions are in effect until 02/03/22    If you have any questions or concerns, please don't hesitate to call.     Sincerely,        Kimberlyn Pérez, DO

## 2022-01-31 ENCOUNTER — OFFICE VISIT (OUTPATIENT)
Dept: UROLOGY | Age: 42
End: 2022-01-31
Payer: COMMERCIAL

## 2022-01-31 VITALS
HEART RATE: 64 BPM | DIASTOLIC BLOOD PRESSURE: 60 MMHG | BODY MASS INDEX: 24.48 KG/M2 | HEIGHT: 62 IN | SYSTOLIC BLOOD PRESSURE: 102 MMHG | WEIGHT: 133 LBS

## 2022-01-31 DIAGNOSIS — N32.81 OAB (OVERACTIVE BLADDER): Primary | ICD-10-CM

## 2022-01-31 PROCEDURE — 99213 OFFICE O/P EST LOW 20 MIN: CPT | Performed by: UROLOGY

## 2022-01-31 RX ORDER — OXYBUTYNIN CHLORIDE 10 MG/1
10 TABLET, EXTENDED RELEASE ORAL DAILY
Qty: 90 TABLET | Refills: 3 | Status: SHIPPED | OUTPATIENT
Start: 2022-01-31 | End: 2022-08-15 | Stop reason: SDUPTHER

## 2022-01-31 NOTE — PROGRESS NOTES
[facility]  Dept: [phone]  Dept Fax: [phone]  Loc: [phone]    ELSY Coppola, [address] [place] [address] Urology Office Note - Follow up Patient    Patient:  Royer Cates  YOB: 1980  Date: 1/31/2022    The patient is a 35 y.o. female who presents today for evaluation of the following problems: urinary frequency  Chief Complaint   Patient presents with    Follow-up     1 year    referred/consultation requested by PIERO Calderon. HISTORY OF PRESENT ILLNESS:       OAB  100 percent improvement   on Oxybutynin  No UTI  No new complaints  Works third shift +bladder irritants      Previous records:    Frequency. Some incontinence. Mixed incontinence urge>>>stress. Some hesitancy  No recurrent infections  No hx of kidney stones  No pads  Does not drink many bladder irritants. Had a hx of nocturia- was on nasal spray. Nocturia not as bothersome    Here after trying vesicare. It did help but was too expensive        Requested/reviewed records from [facility] office and/or outside physician/EMR    (Patient's old records have been requested, reviewed and pertinent findings summarized in today's note.)    Procedures Today: N/A    Last several PSA's:  No results found for: PSA    Last total testosterone:  No results found for: TESTOSTERONE    Urinalysis today:  No results found for this visit on 01/31/22. Last BUN and creatinine:  Lab Results   Component Value Date    BUN 11 02/19/2020     Lab Results   Component Value Date    CREATININE 0.76 02/19/2020       Additional Lab/Culture results: none    Imaging Reviewed during this Office Visit:   Leslye Veronica MD independently reviewed the images and verified the radiology reports from:    CT scan 2014    IMPRESSION:    No acute CT finding in the abdomen or pelvis. Nonvisualization the appendix. Status post hysterectomy.  No right lower    quadrant inflammatory process, bowel obstruction, pathologic free fluid    or free air.       No obstructive uropathy or obvious stone; tiny stones could be missed on    this study.       Mild atelectatic or fibrotic changes LLL.       Report Electronically signed by Giovanni Garcia M.D. on 7/22/2014 9:15 AM   Transcribed by: [facility] on Jul 22 2014  9:17A    Read by: Colleen Schwab M.D.  [id number] on Jul 22 2014  9:17A    Electronically Signed by: Fady Schwab M.D. on: [facility] 22 2014     9:17A          PAST MEDICAL, FAMILY AND SOCIAL HISTORY:  Past Medical History:   Diagnosis Date    Atrophic vaginitis 3/10/2016    Chronic fatigue     Constipation     Endometriosis     h/o endometriosis for which she had a hysterectomy    Genital herpes     Hyperlipidemia     Migraine     migraine cephalgia    Neurocardiogenic syncope     Ovarian cyst     Pelvic inflammatory disease (PID)     Sinusitis, chronic     chronic sinusitis probably related to tobacco abuse    Tobacco abuse     Vitamin D deficiency      Past Surgical History:   Procedure Laterality Date    APPENDECTOMY      LAPAROSCOPY      X3 before the hysterectomy    OTHER SURGICAL HISTORY  2016    lump removed lower right leg    KRISTIN AND BSO  2006    total with bilateral salpingo-oophorectomy    WISDOM TOOTH EXTRACTION       Family History   Problem Relation Age of Onset    Heart Disease Mother         heart murmur/valvular heart disease    Other Mother         family h/o diabetes    Migraines Mother         runs in females on her side of family   Chance Stroke Mother 62    Hypertension Mother    Chance Parkinsonism Mother     Depression Mother     Other Father         family h/o diabetes    Diabetes Maternal Grandmother         diabetes    Diabetes Maternal Grandfather         diabetes    Cancer Paternal Grandfather 76        Acute Leukemia and infection/sudden death    Diabetes Maternal Uncle         Type 2     Outpatient Medications Marked as Taking for the 1/31/22 encounter (Office Visit) with Lexis Ernst MD   Medication Sig Dispense Refill    oxybutynin (DITROPAN XL) 10 MG extended release tablet Take 1 tablet by mouth daily 90 tablet 3    busPIRone (BUSPAR) 5 MG tablet TAKE ONE TABLET BY MOUTH TWICE A DAY 60 tablet 2    albuterol sulfate HFA (VENTOLIN HFA) 108 (90 Base) MCG/ACT inhaler Inhale 2 puffs into the lungs 4 times daily as needed for Wheezing 18 g 0    Spacer/Aero-Holding Chambers MARIA DE JESUS 1 Device by Does not apply route as needed (To be used with albuterol inhaler) 1 each 0    simvastatin (ZOCOR) 20 MG tablet TAKE ONE TABLET BY MOUTH ONCE NIGHTLY 30 tablet 0    pantoprazole (PROTONIX) 40 MG tablet Take 1 tablet by mouth 2 times daily 180 tablet 1    citalopram (CELEXA) 40 MG tablet Take 1 tablet by mouth daily 90 tablet 1    montelukast (SINGULAIR) 10 MG tablet TAKE ONE TABLET BY MOUTH EVERY EVENING 90 tablet 1    metoprolol tartrate (LOPRESSOR) 25 MG tablet TAKE ONE TABLET BY MOUTH TWICE A  tablet 1    fludrocortisone (FLORINEF) 0.1 MG tablet Take 1 tablet by mouth daily 90 tablet 1    midodrine (PROAMATINE) 5 MG tablet Take 1 tablet by mouth 3 times daily 270 tablet 1    levocetirizine (XYZAL) 5 MG tablet Take 1 tablet by mouth daily 90 tablet 1    traZODone (DESYREL) 50 MG tablet Take 1 tablet by mouth nightly as needed for Sleep 90 tablet 1    albuterol sulfate HFA (PROVENTIL HFA) 108 (90 Base) MCG/ACT inhaler Inhale 2 puffs into the lungs every 4 hours as needed for Wheezing 1 Inhaler 0    diphenhydrAMINE (BENADRYL) 25 MG tablet Take 25 mg by mouth nightly as needed for Itching      vitamin D (CHOLECALCIFEROL) 1000 UNIT TABS tablet Take 2 tablets by mouth daily. gummies      Multiple Vitamins-Minerals (MULTIVITAMIN PO) Take 1 tablet by mouth daily.  BLACK COHOSH ROOT Take 600 mg by mouth 2 times daily as needed.  FOR HOT FLASHES         Amoxicillin, Other, and Pcn [penicillins]  Social History     Tobacco Use   Smoking Status Current Every Day Smoker    Packs/day: 1.00    Years: 15.00    Pack years: 15.00    Types: Cigarettes    Start date: 1/1/1998   Smokeless Tobacco Never Used   Tobacco Comment    Will see PCP PRN when ready to quit. (If patient a smoker, smoking cessation counseling offered)   Social History     Substance and Sexual Activity   Alcohol Use Yes    Alcohol/week: 0.0 standard drinks    Comment: RARE       REVIEW OF SYSTEMS:  Constitutional: negative  Eyes: negative  Respiratory: negative  Cardiovascular: negative  Gastrointestinal: negative  Genitourinary: see HPI  Musculoskeletal: negative  Skin: negative   Neurological: negative  Hematological/Lymphatic: negative  Psychological: negative      Physical Exam:    This a 35 y.o. female  Vitals:    01/31/22 0838   BP: 102/60   Pulse: 64     Body mass index is 24.72 kg/m². Constitutional: Patient in no acute distress;         Assessment and Plan        1. OAB (overactive bladder)               Plan:      Has tried other anticholinergics before   100 percent improvement on Oxybutynin. Refilled today  Decrease bladder irritants-- works third shift. 1 year follow up      Prescriptions Ordered:  Orders Placed This Encounter   Medications    oxybutynin (DITROPAN XL) 10 MG extended release tablet     Sig: Take 1 tablet by mouth daily     Dispense:  90 tablet     Refill:  3      Orders Placed:  No orders of the defined types were placed in this encounter.            Michelle Fleix MD

## 2022-02-02 DIAGNOSIS — S52.502A CLOSED FRACTURE OF DISTAL END OF LEFT RADIUS, INITIAL ENCOUNTER: Primary | ICD-10-CM

## 2022-02-10 ENCOUNTER — OFFICE VISIT (OUTPATIENT)
Dept: ORTHOPEDIC SURGERY | Age: 42
End: 2022-02-10
Payer: COMMERCIAL

## 2022-02-10 ENCOUNTER — HOSPITAL ENCOUNTER (OUTPATIENT)
Dept: GENERAL RADIOLOGY | Age: 42
Discharge: HOME | End: 2022-02-12
Payer: COMMERCIAL

## 2022-02-10 VITALS
BODY MASS INDEX: 25.11 KG/M2 | DIASTOLIC BLOOD PRESSURE: 60 MMHG | HEIGHT: 61 IN | SYSTOLIC BLOOD PRESSURE: 124 MMHG | HEART RATE: 86 BPM | WEIGHT: 133 LBS

## 2022-02-10 DIAGNOSIS — S52.502A CLOSED FRACTURE OF DISTAL END OF LEFT RADIUS, INITIAL ENCOUNTER: Primary | ICD-10-CM

## 2022-02-10 DIAGNOSIS — S52.502A CLOSED FRACTURE OF DISTAL END OF LEFT RADIUS, INITIAL ENCOUNTER: ICD-10-CM

## 2022-02-10 PROCEDURE — 99213 OFFICE O/P EST LOW 20 MIN: CPT | Performed by: FAMILY MEDICINE

## 2022-02-10 PROCEDURE — 73110 X-RAY EXAM OF WRIST: CPT

## 2022-02-10 NOTE — PROGRESS NOTES
Subjective:      Patient ID: Jalil Truong is a 35 y.o.  female. Chief Complaint   Patient presents with    Wrist Pain     left wrist pain- ct results      Fracture Follow-up  Patient here for follow up of a left forearm (distal radius) fracture. The injury occurred 2 weeks ago. She reports no problems with the cast or injury, and no problems with swelling, numbness, or tingling in her left wrist.     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 Vaping Use    Vaping Use: Never used   Substance and Sexual Activity    Alcohol use: Yes     Alcohol/week: 0.0 standard drinks     Comment: RARE    Drug use: No     Types: Cocaine     Comment: HAS BEEN OFF CRACK COCAINE FOR SEVERAL YEARS    Sexual activity: Yes     Partners: Male     Birth control/protection: Surgical     Comment:       @ROS@    Objective:   Left Hand Exam     Tenderness   The patient is experiencing tenderness in the radial area. Range of Motion   Wrist   Extension: abnormal   Flexion: abnormal     Muscle Strength   The patient has normal left wrist strength. Tests   Phalens Sign: negative  Tinel's sign (median nerve): positive  Finkelstein's test: negative    Other   Erythema: present  Scars: absent  Sensation: normal  Pulse: present    Comments:  Radiographs demonstrate stable alignment of the distal end of the left radius fracture without significant interval healing noted on plain film radiograph              Assessment:     1. Closed fracture of distal end of left radius, initial encounter        Plan:      At this point patient is doing fairly well her fracture continues to remain stable her swelling is improving though she continues to get bruising on the palmar aspect of the wrist along the distribution of the flexor tendons she continues to experience some pain but is icing regularly and trying to continue to work I explained to her that the more she does the more that might irritated though it is not causing her harm we will see her back in 2 weeks repeat radiographs. She voiced understanding agreement this plan.

## 2022-02-16 DIAGNOSIS — S52.502A CLOSED FRACTURE OF DISTAL END OF LEFT RADIUS, INITIAL ENCOUNTER: Primary | ICD-10-CM

## 2022-02-24 ENCOUNTER — OFFICE VISIT (OUTPATIENT)
Dept: ORTHOPEDIC SURGERY | Age: 42
End: 2022-02-24
Payer: COMMERCIAL

## 2022-02-24 ENCOUNTER — HOSPITAL ENCOUNTER (OUTPATIENT)
Dept: GENERAL RADIOLOGY | Age: 42
Discharge: HOME | End: 2022-02-26
Payer: COMMERCIAL

## 2022-02-24 VITALS
BODY MASS INDEX: 25.11 KG/M2 | HEIGHT: 61 IN | DIASTOLIC BLOOD PRESSURE: 89 MMHG | HEART RATE: 73 BPM | SYSTOLIC BLOOD PRESSURE: 133 MMHG | WEIGHT: 133 LBS

## 2022-02-24 DIAGNOSIS — S52.502A CLOSED FRACTURE OF DISTAL END OF LEFT RADIUS, INITIAL ENCOUNTER: Primary | ICD-10-CM

## 2022-02-24 DIAGNOSIS — S52.502A CLOSED FRACTURE OF DISTAL END OF LEFT RADIUS, INITIAL ENCOUNTER: ICD-10-CM

## 2022-02-24 PROCEDURE — 99214 OFFICE O/P EST MOD 30 MIN: CPT | Performed by: FAMILY MEDICINE

## 2022-02-24 PROCEDURE — 99213 OFFICE O/P EST LOW 20 MIN: CPT | Performed by: FAMILY MEDICINE

## 2022-02-24 PROCEDURE — 73110 X-RAY EXAM OF WRIST: CPT

## 2022-02-24 PROCEDURE — L3908 WHO COCK-UP NONMOLDE PRE OTS: HCPCS | Performed by: FAMILY MEDICINE

## 2022-02-24 RX ORDER — DOXYCYCLINE 100 MG/1
CAPSULE ORAL
COMMUNITY
Start: 2022-02-21 | End: 2022-05-17 | Stop reason: ALTCHOICE

## 2022-02-24 NOTE — PROGRESS NOTES
Subjective:      Patient ID: Fortunato Owens is a 35 y.o.  female. Chief Complaint   Patient presents with    Wrist Injury     left wrist injury     Fracture Follow-up  Patient here for follow up of a left forearm (distal radius) fracture. The injury occurred 4 weeks ago. She reports no problems with the cast or injury, and no problems with swelling, numbness, or tingling in her left wrist.     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 Vaping Use    Vaping Use: Never used   Substance and Sexual Activity    Alcohol use: Yes     Alcohol/week: 0.0 standard drinks     Comment: RARE    Drug use: No     Types: Cocaine     Comment: HAS BEEN OFF CRACK COCAINE FOR SEVERAL YEARS    Sexual activity: Yes     Partners: Male     Birth control/protection: Surgical     Comment:       @ROS@    Objective:   Left Hand Exam     Tenderness   The patient is experiencing tenderness in the radial area. Range of Motion   Wrist   Extension: normal   Flexion: abnormal   Pronation: normal   Supination: normal     Muscle Strength   The patient has normal left wrist strength. Tests   Phalens Sign: negative  Tinel's sign (median nerve): negative  Finkelstein's test: negative    Other   Erythema: absent  Scars: absent  Sensation: normal  Pulse: present          X-rays remain stable with healing of her fracture    Assessment:     1. Closed fracture of distal end of left radius, initial encounter      Plan:      At this point patient is doing fairly well she is having some discomfort with splint we will transition her to a softer cock-up wrist splint with a thumb spica and see her back in 2 to 3 weeks for repeat radiographs        Procedures    Who cock-up nonmolde pre ots     Patient was prescribed a Procare Comfort Wrist and Thumb brace. The left wrist will require stabilization / immobilization from this semi-rigid / rigid orthosis to improve their function. The orthosis will assist in protecting the affected area, provide functional support and facilitate healing. The patient was educated and fit by a healthcare professional with expert knowledge and specialization in brace application while under the direct supervision of the treating physician. Verbal and written instructions for the use of and application of this item were provided. They were instructed to contact the office immediately should the brace result in increased pain, decreased sensation, increased swelling or worsening of the condition.

## 2022-02-25 ENCOUNTER — TELEPHONE (OUTPATIENT)
Dept: FAMILY MEDICINE CLINIC | Age: 42
End: 2022-02-25

## 2022-03-02 DIAGNOSIS — S52.502A CLOSED FRACTURE OF DISTAL END OF LEFT RADIUS, INITIAL ENCOUNTER: Primary | ICD-10-CM

## 2022-03-03 ENCOUNTER — TELEPHONE (OUTPATIENT)
Dept: FAMILY MEDICINE CLINIC | Age: 42
End: 2022-03-03

## 2022-03-03 ENCOUNTER — OFFICE VISIT (OUTPATIENT)
Dept: FAMILY MEDICINE CLINIC | Age: 42
End: 2022-03-03
Payer: COMMERCIAL

## 2022-03-03 ENCOUNTER — HOSPITAL ENCOUNTER (OUTPATIENT)
Dept: LAB | Age: 42
Discharge: HOME | End: 2022-03-03
Payer: COMMERCIAL

## 2022-03-03 VITALS
BODY MASS INDEX: 24.18 KG/M2 | WEIGHT: 131.4 LBS | HEART RATE: 72 BPM | HEIGHT: 62 IN | DIASTOLIC BLOOD PRESSURE: 78 MMHG | SYSTOLIC BLOOD PRESSURE: 110 MMHG

## 2022-03-03 DIAGNOSIS — K21.9 GASTROESOPHAGEAL REFLUX DISEASE, UNSPECIFIED WHETHER ESOPHAGITIS PRESENT: ICD-10-CM

## 2022-03-03 DIAGNOSIS — F41.8 ANXIETY WITH DEPRESSION: ICD-10-CM

## 2022-03-03 DIAGNOSIS — J32.4 CHRONIC PANSINUSITIS: ICD-10-CM

## 2022-03-03 DIAGNOSIS — J32.4 CHRONIC PANSINUSITIS: Primary | ICD-10-CM

## 2022-03-03 DIAGNOSIS — E78.2 MIXED HYPERLIPIDEMIA: ICD-10-CM

## 2022-03-03 DIAGNOSIS — R55 NEUROCARDIOGENIC SYNCOPE: ICD-10-CM

## 2022-03-03 DIAGNOSIS — G43.709 CHRONIC MIGRAINE WITHOUT AURA WITHOUT STATUS MIGRAINOSUS, NOT INTRACTABLE: ICD-10-CM

## 2022-03-03 DIAGNOSIS — G47.00 INSOMNIA, UNSPECIFIED TYPE: ICD-10-CM

## 2022-03-03 DIAGNOSIS — E55.9 VITAMIN D DEFICIENCY: ICD-10-CM

## 2022-03-03 LAB
ALBUMIN SERPL-MCNC: 4.5 G/DL (ref 3.5–5.2)
ALBUMIN/GLOBULIN RATIO: 1.5 (ref 1–2.5)
ALP BLD-CCNC: 119 U/L (ref 35–104)
ALT SERPL-CCNC: 8 U/L (ref 5–33)
ANION GAP SERPL CALCULATED.3IONS-SCNC: 12 MMOL/L (ref 9–17)
AST SERPL-CCNC: 15 U/L
BILIRUB SERPL-MCNC: 0.21 MG/DL (ref 0.3–1.2)
BUN BLDV-MCNC: 9 MG/DL (ref 6–20)
BUN/CREAT BLD: 11 (ref 9–20)
CALCIUM SERPL-MCNC: 9.4 MG/DL (ref 8.6–10.4)
CHLORIDE BLD-SCNC: 102 MMOL/L (ref 98–107)
CHOLESTEROL/HDL RATIO: 5.1
CHOLESTEROL: 204 MG/DL
CO2: 27 MMOL/L (ref 20–31)
CREAT SERPL-MCNC: 0.81 MG/DL (ref 0.5–0.9)
ESTIMATED AVERAGE GLUCOSE: 111 MG/DL
GFR AFRICAN AMERICAN: >60 ML/MIN
GFR NON-AFRICAN AMERICAN: >60 ML/MIN
GFR SERPL CREATININE-BSD FRML MDRD: ABNORMAL ML/MIN/{1.73_M2}
GLUCOSE BLD-MCNC: 75 MG/DL (ref 70–99)
HBA1C MFR BLD: 5.5 % (ref 4–6)
HDLC SERPL-MCNC: 40 MG/DL
LDL CHOLESTEROL: 141 MG/DL (ref 0–130)
POTASSIUM SERPL-SCNC: 3.5 MMOL/L (ref 3.7–5.3)
SODIUM BLD-SCNC: 141 MMOL/L (ref 135–144)
TOTAL PROTEIN: 7.5 G/DL (ref 6.4–8.3)
TRIGL SERPL-MCNC: 115 MG/DL
TSH SERPL DL<=0.05 MIU/L-ACNC: 1.82 MIU/L (ref 0.3–5)
VITAMIN D 25-HYDROXY: 40.5 NG/ML

## 2022-03-03 PROCEDURE — 99214 OFFICE O/P EST MOD 30 MIN: CPT | Performed by: PHYSICIAN ASSISTANT

## 2022-03-03 PROCEDURE — 82306 VITAMIN D 25 HYDROXY: CPT

## 2022-03-03 PROCEDURE — 80053 COMPREHEN METABOLIC PANEL: CPT

## 2022-03-03 PROCEDURE — 84443 ASSAY THYROID STIM HORMONE: CPT

## 2022-03-03 PROCEDURE — 80061 LIPID PANEL: CPT

## 2022-03-03 PROCEDURE — 36415 COLL VENOUS BLD VENIPUNCTURE: CPT

## 2022-03-03 PROCEDURE — 83036 HEMOGLOBIN GLYCOSYLATED A1C: CPT

## 2022-03-03 ASSESSMENT — PATIENT HEALTH QUESTIONNAIRE - PHQ9
1. LITTLE INTEREST OR PLEASURE IN DOING THINGS: 1
SUM OF ALL RESPONSES TO PHQ QUESTIONS 1-9: 2
SUM OF ALL RESPONSES TO PHQ QUESTIONS 1-9: 2
2. FEELING DOWN, DEPRESSED OR HOPELESS: 1
SUM OF ALL RESPONSES TO PHQ QUESTIONS 1-9: 2
SUM OF ALL RESPONSES TO PHQ9 QUESTIONS 1 & 2: 2
SUM OF ALL RESPONSES TO PHQ QUESTIONS 1-9: 2

## 2022-03-03 ASSESSMENT — ENCOUNTER SYMPTOMS
BLURRED VISION: 0
SINUS PRESSURE: 1
COUGH: 1
ABDOMINAL PAIN: 0
HOARSE VOICE: 1

## 2022-03-03 NOTE — TELEPHONE ENCOUNTER
Patient was seen in the office today. Called back this afternoon stating she was feeling worse and won't be going into work tonight either. Wanted work excuse for last night and tonight. Kerrie powell'd.

## 2022-03-03 NOTE — PROGRESS NOTES
Subjective:      Patient ID: Chandu Leal is a 35 y.o. female. Sinusitis  This is a chronic problem. The current episode started more than 1 year ago. The problem has been waxing and waning since onset. There has been no fever. Associated symptoms include congestion, coughing, a hoarse voice and sinus pressure. Past treatments include antibiotics (Singulair). Migraine   This is a chronic problem. The current episode started more than 1 year ago. The pain does not radiate. The pain quality is similar to prior headaches. Associated symptoms include coughing and sinus pressure. Pertinent negatives include no abdominal pain, abnormal behavior, blurred vision or insomnia. Mental Health Problem  The current episode started more than 1 month ago. This is a chronic problem. The onset of the illness is precipitated by emotional stress. The degree of incapacity that she is experiencing as a consequence of her illness is mild. Additional symptoms of the illness do not include anhedonia, insomnia, fatigue or abdominal pain. She does not admit to suicidal ideas. She does not have a plan to attempt suicide. Gastroesophageal Reflux  She complains of coughing and a hoarse voice. She reports no abdominal pain. This is a chronic problem. The current episode started more than 1 year ago. The symptoms are aggravated by smoking. Pertinent negatives include no fatigue. She has tried a PPI for the symptoms. Review of Systems   Constitutional: Negative for fatigue. HENT: Positive for congestion, hoarse voice and sinus pressure. Eyes: Negative for blurred vision. Respiratory: Positive for cough. Cardiovascular: Negative. Gastrointestinal: Negative for abdominal pain. Genitourinary: Negative. Musculoskeletal: Negative. Psychiatric/Behavioral: The patient does not have insomnia.         Past Medical History:   Diagnosis Date    Atrophic vaginitis 3/10/2016    Chronic fatigue     Constipation     Endometriosis     h/o endometriosis for which she had a hysterectomy    Genital herpes     Hyperlipidemia     Migraine     migraine cephalgia    Neurocardiogenic syncope     Ovarian cyst     Pelvic inflammatory disease (PID)     Sinusitis, chronic     chronic sinusitis probably related to tobacco abuse    Tobacco abuse     Vitamin D deficiency      Past Surgical History:   Procedure Laterality Date    APPENDECTOMY      LAPAROSCOPY      X3 before the hysterectomy    OTHER SURGICAL HISTORY  2016    lump removed lower right leg    KRISTIN AND BSO  2006    total with bilateral salpingo-oophorectomy    WISDOM TOOTH EXTRACTION       Social History     Tobacco Use    Smoking status: Current Every Day Smoker     Packs/day: 1.00     Years: 15.00     Pack years: 15.00     Types: Cigarettes     Start date: 1/1/1998    Smokeless tobacco: Never Used    Tobacco comment: Will see PCP PRN when ready to quit. Vaping Use    Vaping Use: Never used   Substance Use Topics    Alcohol use: Yes     Alcohol/week: 0.0 standard drinks     Comment: RARE    Drug use: No     Types: Cocaine     Comment: HAS BEEN OFF CRACK COCAINE FOR SEVERAL YEARS       Objective:   Physical Exam  HENT:      Head: Normocephalic. Right Ear: Tympanic membrane normal.      Left Ear: Tympanic membrane normal.      Nose: Congestion and rhinorrhea present. Mouth/Throat:      Mouth: Mucous membranes are moist.   Eyes:      Pupils: Pupils are equal, round, and reactive to light. Cardiovascular:      Rate and Rhythm: Normal rate. Pulmonary:      Effort: Pulmonary effort is normal.      Breath sounds: Normal breath sounds. Musculoskeletal:      Cervical back: Neck supple. Comments: Splint left wrist   Neurological:      General: No focal deficit present. Mental Status: She is alert and oriented to person, place, and time. Psychiatric:         Mood and Affect: Mood normal.         Assessment:      1. Chronic pansinusitis    2. Chronic migraine without aura without status migrainosus, not intractable    3. Anxiety with depression    4. Gastroesophageal reflux disease, unspecified whether esophagitis present          Plan:      Interval history reviewed with patient. Referral to ENT for evaluation. Healthy diet and routine exercise. Continue current medications. Continue to follow with orthopedics for wrist fracture. Continue counseling. Coping strategies. Follow-up in six months/sooner PRN.         PIERO Maciel

## 2022-03-09 DIAGNOSIS — E78.2 MIXED HYPERLIPIDEMIA: ICD-10-CM

## 2022-03-09 NOTE — TELEPHONE ENCOUNTER
----- Message from [place], [place] sent at 3/8/2022  5:07 PM EST -----  Lipids elevated. Is patient taking her Zocor? Potassium minimally decreased. Healthy diet encouraged.

## 2022-03-14 DIAGNOSIS — S52.502A CLOSED FRACTURE OF DISTAL END OF LEFT RADIUS, INITIAL ENCOUNTER: Primary | ICD-10-CM

## 2022-03-16 ENCOUNTER — TELEPHONE (OUTPATIENT)
Dept: OTOLARYNGOLOGY | Age: 42
End: 2022-03-16

## 2022-03-16 NOTE — TELEPHONE ENCOUNTER
Pt returning nurses call, advised of elevated lipids and asked about taking the Zocor and pt states she wasn't as she had asked for a refill and never got a refill, please call back with recommendations.

## 2022-03-16 NOTE — TELEPHONE ENCOUNTER
Patient states she is returning a phone call to ENT nurse to reschedule appointment. Please call her back at [phone].

## 2022-03-17 ENCOUNTER — OFFICE VISIT (OUTPATIENT)
Dept: ORTHOPEDIC SURGERY | Age: 42
End: 2022-03-17
Payer: COMMERCIAL

## 2022-03-17 ENCOUNTER — HOSPITAL ENCOUNTER (OUTPATIENT)
Dept: GENERAL RADIOLOGY | Age: 42
Discharge: HOME | End: 2022-03-19
Payer: COMMERCIAL

## 2022-03-17 VITALS
BODY MASS INDEX: 24.73 KG/M2 | HEIGHT: 61 IN | HEART RATE: 64 BPM | WEIGHT: 131 LBS | DIASTOLIC BLOOD PRESSURE: 76 MMHG | SYSTOLIC BLOOD PRESSURE: 110 MMHG

## 2022-03-17 DIAGNOSIS — S52.502A CLOSED FRACTURE OF DISTAL END OF LEFT RADIUS, INITIAL ENCOUNTER: Primary | ICD-10-CM

## 2022-03-17 DIAGNOSIS — S52.502A CLOSED FRACTURE OF DISTAL END OF LEFT RADIUS, INITIAL ENCOUNTER: ICD-10-CM

## 2022-03-17 PROCEDURE — 73110 X-RAY EXAM OF WRIST: CPT

## 2022-03-17 PROCEDURE — 99213 OFFICE O/P EST LOW 20 MIN: CPT | Performed by: FAMILY MEDICINE

## 2022-03-17 PROCEDURE — L3908 WHO COCK-UP NONMOLDE PRE OTS: HCPCS | Performed by: FAMILY MEDICINE

## 2022-03-17 PROCEDURE — 99214 OFFICE O/P EST MOD 30 MIN: CPT | Performed by: FAMILY MEDICINE

## 2022-03-17 RX ORDER — LEVOFLOXACIN 500 MG/1
500 TABLET, FILM COATED ORAL EVERY 24 HOURS
COMMUNITY
Start: 2022-03-10 | End: 2022-03-20

## 2022-03-17 RX ORDER — DEXAMETHASONE 6 MG/1
6 TABLET ORAL DAILY
COMMUNITY
Start: 2022-03-10 | End: 2022-03-20

## 2022-03-17 NOTE — PROGRESS NOTES
Subjective:      Patient ID: Nadia Helms is a 35 y.o.  female. Chief Complaint   Patient presents with    Wrist Pain     rech left wrist     Fracture Follow-up  Patient here for follow up of a left forearm (distal radius) fracture. The injury occurred 8 weeks ago. She reports no problems with the cast or injury, and no problems with swelling, numbness, or tingling in her r wrist, just uncomfortable working still. 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 Vaping Use    Vaping Use: Never used   Substance and Sexual Activity    Alcohol use: Yes     Alcohol/week: 0.0 standard drinks     Comment: RARE    Drug use: No     Types: Cocaine     Comment: HAS BEEN OFF CRACK COCAINE FOR SEVERAL YEARS    Sexual activity: Yes     Partners: Male     Birth control/protection: Surgical     Comment:       @ROS@    Objective:   Left Hand Exam     Tenderness   The patient is experiencing tenderness in the radial area. Range of Motion   Wrist   Extension: normal   Flexion: abnormal   Pronation: normal   Supination: normal     Muscle Strength   The patient has normal left wrist strength. Tests   Phalens Sign: negative  Tinel's sign (median nerve): negative  Finkelstein's test: negative    Other   Erythema: absent  Scars: absent  Sensation: normal  Pulse: present              Assessment:     1. Closed fracture of distal end of left radius, initial encounter      Plan:      At this point patient continues to improve and I do think that we can protect her little bit less which is a normal soft cock-up wrist splint we will see her back in 3 weeks for repeat radiographs patient voiced understanding agreement this plan        Procedures    Who cock-up nonmolde pre ots     Patient was prescribed a Procare Comfort Form Wrist brace. The left wrist will require stabilization / immobilization from this semi-rigid / rigid orthosis to improve their function. The orthosis will assist in protecting the affected area, provide functional support and facilitate healing. The patient was educated and fit by a healthcare professional with expert knowledge and specialization in brace application while under the direct supervision of the treating physician. Verbal and written instructions for the use of and application of this item were provided. They were instructed to contact the office immediately should the brace result in increased pain, decreased sensation, increased swelling or worsening of the condition.

## 2022-03-21 RX ORDER — SIMVASTATIN 20 MG
TABLET ORAL
Qty: 30 TABLET | Refills: 3 | Status: SHIPPED | OUTPATIENT
Start: 2022-03-21 | End: 2022-08-05 | Stop reason: SDUPTHER

## 2022-04-02 DIAGNOSIS — K21.9 GASTROESOPHAGEAL REFLUX DISEASE, UNSPECIFIED WHETHER ESOPHAGITIS PRESENT: ICD-10-CM

## 2022-04-02 DIAGNOSIS — R55 NEUROCARDIOGENIC SYNCOPE: ICD-10-CM

## 2022-04-04 RX ORDER — PANTOPRAZOLE SODIUM 40 MG/1
TABLET, DELAYED RELEASE ORAL
Qty: 180 TABLET | Refills: 1 | Status: SHIPPED | OUTPATIENT
Start: 2022-04-04 | End: 2022-08-05 | Stop reason: SDUPTHER

## 2022-04-07 ENCOUNTER — TELEPHONE (OUTPATIENT)
Dept: FAMILY MEDICINE CLINIC | Age: 42
End: 2022-04-07

## 2022-04-07 NOTE — TELEPHONE ENCOUNTER
----- Message from [facility] sent at 4/7/2022 12:29 PM EDT -----  Subject: Appointment Request    Reason for Call: Urgent (Patient Request) Hospital Follow Up    QUESTIONS  Type of Appointment? Established Patient  Reason for appointment request? No appointments available during search  Additional Information for Provider? Pt was discharged from the hospital   on 04/06/22, she was advised to see her PCP within two days, she was seen   for pneumonia, No appointment with in the two day range hospital is ask   her to be seen by.  ---------------------------------------------------------------------------  --------------  [employer]  What is the best way for the office to contact you? OK to leave message on   voicemail  Preferred Call Back Phone Number? [phone]  ---------------------------------------------------------------------------  --------------  SCRIPT ANSWERS  Relationship to Patient? Self  (Patient requests to see provider urgently. )? Yes  (Has the patient been discharged from the hospital within 2 business days   AND does not have a Telephone Encounter  Follow Up From [address] [place] [address]   documented in [address]?)? Yes  Do you have any questions for your primary care provider that need to be   answered prior to your appointment? (Use RN Triage if question pertains to   anything on the red flag list)? No  (Patient needs follow up visit after hospital discharge) Book first   available appointment within 7 days OF DISCHARGE, if no appt, proceed to   book the next available time slot within 14 days OF DISCHARGE AND Send   Message to Provider. [facility] Follow Up appointment cannot be booked   beyond 14 Days and should result in a Message to Provider. ? No  Have you been diagnosed with, awaiting test results for, or told that you   are suspected of having COVID-19 (Coronavirus)?  (If patient has tested   negative or was tested as a requirement for work, school, or travel and   not based on symptoms, answer no)? No  Within the past 10 days have you developed any of the following symptoms   (answer no if symptoms have been present longer than 10 days or began   more than 10 days ago)? Fever or Chills, Cough, Shortness of breath or   difficulty breathing, Loss of taste or smell, Sore throat, Nasal   congestion, Sneezing or runny nose, Fatigue or generalized body aches   (answer no if pain is specific to a body part e.g. back pain), Diarrhea,   Headache? No  Have you had close contact with someone with COVID-19 in the last 7 days? No  (Service Expert  click yes below to proceed with [employer] As Usual   Scheduling)?  Yes

## 2022-04-11 ENCOUNTER — OFFICE VISIT (OUTPATIENT)
Dept: FAMILY MEDICINE CLINIC | Age: 42
End: 2022-04-11
Payer: COMMERCIAL

## 2022-04-11 VITALS
BODY MASS INDEX: 25.76 KG/M2 | SYSTOLIC BLOOD PRESSURE: 122 MMHG | HEART RATE: 72 BPM | WEIGHT: 140 LBS | OXYGEN SATURATION: 96 % | DIASTOLIC BLOOD PRESSURE: 82 MMHG | HEIGHT: 62 IN

## 2022-04-11 DIAGNOSIS — S52.502A CLOSED FRACTURE OF DISTAL END OF LEFT RADIUS, INITIAL ENCOUNTER: Primary | ICD-10-CM

## 2022-04-11 DIAGNOSIS — Z72.0 TOBACCO ABUSE: ICD-10-CM

## 2022-04-11 DIAGNOSIS — J18.9 PNEUMONITIS: Primary | ICD-10-CM

## 2022-04-11 DIAGNOSIS — R09.02 HYPOXIA: ICD-10-CM

## 2022-04-11 PROCEDURE — 99214 OFFICE O/P EST MOD 30 MIN: CPT | Performed by: PHYSICIAN ASSISTANT

## 2022-04-11 PROCEDURE — 1111F DSCHRG MED/CURRENT MED MERGE: CPT | Performed by: PHYSICIAN ASSISTANT

## 2022-04-11 RX ORDER — BUDESONIDE AND FORMOTEROL FUMARATE DIHYDRATE 160; 4.5 UG/1; UG/1
1 AEROSOL RESPIRATORY (INHALATION) 2 TIMES DAILY
Qty: 10.2 G | Refills: 3 | Status: SHIPPED | OUTPATIENT
Start: 2022-04-11

## 2022-04-11 RX ORDER — NICOTINE 21 MG/24HR
1 PATCH, TRANSDERMAL 24 HOURS TRANSDERMAL EVERY 24 HOURS
Qty: 15 PATCH | Refills: 0 | Status: ON HOLD
Start: 2022-04-11 | End: 2022-07-25 | Stop reason: HOSPADM

## 2022-04-11 RX ORDER — PREDNISONE 10 MG/1
10 TABLET ORAL DAILY
COMMUNITY
End: 2022-07-02

## 2022-04-11 RX ORDER — NICOTINE 21 MG/24HR
1 PATCH, TRANSDERMAL 24 HOURS TRANSDERMAL EVERY 24 HOURS
Qty: 45 PATCH | Refills: 0 | Status: SHIPPED | OUTPATIENT
Start: 2022-04-11 | End: 2022-05-26

## 2022-04-11 ASSESSMENT — PATIENT HEALTH QUESTIONNAIRE - PHQ9
5. POOR APPETITE OR OVEREATING: 0
8. MOVING OR SPEAKING SO SLOWLY THAT OTHER PEOPLE COULD HAVE NOTICED. OR THE OPPOSITE, BEING SO FIGETY OR RESTLESS THAT YOU HAVE BEEN MOVING AROUND A LOT MORE THAN USUAL: 0
4. FEELING TIRED OR HAVING LITTLE ENERGY: 2
9. THOUGHTS THAT YOU WOULD BE BETTER OFF DEAD, OR OF HURTING YOURSELF: 0
7. TROUBLE CONCENTRATING ON THINGS, SUCH AS READING THE NEWSPAPER OR WATCHING TELEVISION: 0
6. FEELING BAD ABOUT YOURSELF - OR THAT YOU ARE A FAILURE OR HAVE LET YOURSELF OR YOUR FAMILY DOWN: 0
SUM OF ALL RESPONSES TO PHQ QUESTIONS 1-9: 8
10. IF YOU CHECKED OFF ANY PROBLEMS, HOW DIFFICULT HAVE THESE PROBLEMS MADE IT FOR YOU TO DO YOUR WORK, TAKE CARE OF THINGS AT HOME, OR GET ALONG WITH OTHER PEOPLE: 1
SUM OF ALL RESPONSES TO PHQ QUESTIONS 1-9: 8
3. TROUBLE FALLING OR STAYING ASLEEP: 3
SUM OF ALL RESPONSES TO PHQ QUESTIONS 1-9: 8
2. FEELING DOWN, DEPRESSED OR HOPELESS: 1
SUM OF ALL RESPONSES TO PHQ QUESTIONS 1-9: 8
1. LITTLE INTEREST OR PLEASURE IN DOING THINGS: 2
SUM OF ALL RESPONSES TO PHQ9 QUESTIONS 1 & 2: 3

## 2022-04-11 ASSESSMENT — ENCOUNTER SYMPTOMS
GASTROINTESTINAL NEGATIVE: 1
CHEST TIGHTNESS: 1
SHORTNESS OF BREATH: 1
COUGH: 1

## 2022-04-11 NOTE — PROGRESS NOTES
Post-Discharge Transitional Care Follow Up    Ethan Mejia   YOB: 1980    Date of Office Visit:  4/11/2022  Date of Hospital Admission: 4/6/2022  Date of Hospital Discharge: 4/7/2022    Care management risk score Rising risk (score 2-5) and Complex Care (Scores >=6): 1     Non face to face  following discharge, date last encounter closed (first attempt may have been earlier): 4/8/2022  9:56 AM     Call initiated 2 business days of discharge: Yes    ASSESSMENT/PLAN:   Pneumonitis  -     VT DISCHARGE MEDS RECONCILED W/ CURRENT OUTPATIENT MED LIST  Tobacco abuse  -     nicotine (NICODERM CQ) 21 MG/24HR; Place 1 patch onto the skin every 24 hours, Disp-45 patch, R-0Normal  -     nicotine (NICODERM CQ) 14 MG/24HR; Place 1 patch onto the skin every 24 hours for 15 days, Disp-15 patch, R-0Normal  -     nicotine (NICODERM CQ) 7 MG/24HR; Place 1 patch onto the skin every 24 hours for 15 days, Disp-15 patch, R-0Normal  -     Full PFT Study With Bronchodilator; Future  -     budesonide-formoterol (SYMBICORT) 160-4.5 MCG/ACT AERO; Inhale 1 puff into the lungs 2 times daily, Disp-10.2 g, R-3Normal  Hypoxia  -     Full PFT Study With Bronchodilator; Future      Medical Decision Making: moderate complexity  No follow-ups on file. On this date 4/11/2022 I have spent 25 minutes reviewing previous notes, test results and face to face with the patient discussing the diagnosis and importance of compliance with the treatment plan as well as documenting on the day of the visit. Subjective:   Patient is seen in the office for hospital follow-up. Patient was at [facility] for acute pneumonitis. Patient was seen in Urgent Care and pulse oximetry was 89% room air. Patient was transferred to [facility] ER. She had negative CXR. CT chest showed viral pneumonitis. Patient was discharged on prednisone which she continues to take. Patient smokes 1 ppd. We discussed the importance of complete tobacco cessation.   She is using albuterol inhaler but is on no maintenance inhaler. She has never completed a PFT. Patient says that she is feeling better. She denies concerns or complaints today. Inpatient course: Discharge summary reviewed- see chart.     Interval history/Current status: reviewed    Patient Active Problem List   Diagnosis    Vitamin D deficiency    Sinusitis, chronic    Migraine    Constipation    Genital herpes    Neurocardiogenic syncope    Chronic fatigue    Tobacco abuse    Atrophic vaginitis    Mixed hyperlipidemia    Lower urinary tract symptoms       Medication list at time of discharge reviewed: Yes    Medications marked \"taking\" at this time  Outpatient Medications Marked as Taking for the 4/11/22 encounter (Office Visit) with PIERO Forde   Medication Sig Dispense Refill    predniSONE (DELTASONE) 10 MG tablet Take 10 mg by mouth daily      nicotine (NICODERM CQ) 21 MG/24HR Place 1 patch onto the skin every 24 hours 45 patch 0    nicotine (NICODERM CQ) 14 MG/24HR Place 1 patch onto the skin every 24 hours for 15 days 15 patch 0    nicotine (NICODERM CQ) 7 MG/24HR Place 1 patch onto the skin every 24 hours for 15 days 15 patch 0    budesonide-formoterol (SYMBICORT) 160-4.5 MCG/ACT AERO Inhale 1 puff into the lungs 2 times daily 10.2 g 3    pantoprazole (PROTONIX) 40 MG tablet TAKE ONE TABLET BY MOUTH TWICE A  tablet 1    metoprolol tartrate (LOPRESSOR) 25 MG tablet TAKE ONE TABLET BY MOUTH TWICE A DAY (Patient taking differently: 50 mg 2 times daily TAKE ONE TABLET BY MOUTH TWICE A DAY) 180 tablet 1    simvastatin (ZOCOR) 20 MG tablet TAKE ONE TABLET BY MOUTH ONCE NIGHTLY 30 tablet 3    doxycycline monohydrate (MONODOX) 100 MG capsule       oxybutynin (DITROPAN XL) 10 MG extended release tablet Take 1 tablet by mouth daily 90 tablet 3    busPIRone (BUSPAR) 5 MG tablet TAKE ONE TABLET BY MOUTH TWICE A DAY 60 tablet 2    citalopram (CELEXA) 40 MG tablet Take 1 tablet by mouth daily 90 tablet 1    montelukast (SINGULAIR) 10 MG tablet TAKE ONE TABLET BY MOUTH EVERY EVENING 90 tablet 1    fludrocortisone (FLORINEF) 0.1 MG tablet Take 1 tablet by mouth daily 90 tablet 1    midodrine (PROAMATINE) 5 MG tablet Take 1 tablet by mouth 3 times daily 270 tablet 1    levocetirizine (XYZAL) 5 MG tablet Take 1 tablet by mouth daily 90 tablet 1    traZODone (DESYREL) 50 MG tablet Take 1 tablet by mouth nightly as needed for Sleep 90 tablet 1    albuterol sulfate HFA (PROVENTIL HFA) 108 (90 Base) MCG/ACT inhaler Inhale 2 puffs into the lungs every 4 hours as needed for Wheezing 1 Inhaler 0    diphenhydrAMINE (BENADRYL) 25 MG tablet Take 25 mg by mouth nightly as needed for Itching      vitamin D (CHOLECALCIFEROL) 1000 UNIT TABS tablet Take 2 tablets by mouth daily. gummies      Multiple Vitamins-Minerals (MULTIVITAMIN PO) Take 1 tablet by mouth daily.  BLACK COHOSH ROOT Take 600 mg by mouth 2 times daily as needed. FOR HOT FLASHES          Medications patient taking as of now reconciled against medications ordered at time of hospital discharge: Yes    Review of Systems   Constitutional: Negative. HENT: Negative. Respiratory: Positive for cough, chest tightness and shortness of breath. Cardiovascular: Negative. Gastrointestinal: Negative. Objective:    /82 (Site: Right Upper Arm, Position: Sitting, Cuff Size: Large Adult)   Pulse 72   Ht 5' 1.5\" (1.562 m)   Wt 140 lb (63.5 kg)   LMP 08/01/2006   SpO2 96%   BMI 26.02 kg/m²   Physical Exam  HENT:      Head: Normocephalic. Right Ear: Tympanic membrane normal.      Left Ear: Tympanic membrane normal.   Eyes:      Pupils: Pupils are equal, round, and reactive to light. Cardiovascular:      Rate and Rhythm: Normal rate. Pulmonary:      Effort: Pulmonary effort is normal.      Breath sounds: Normal breath sounds. Neurological:      General: No focal deficit present.       Mental Status: She is alert and oriented to person, place, and time. Psychiatric:         Mood and Affect: Mood normal.           An electronic signature was used to authenticate this note.   --PIERO Morales

## 2022-04-14 ENCOUNTER — OFFICE VISIT (OUTPATIENT)
Dept: ORTHOPEDIC SURGERY | Age: 42
End: 2022-04-14
Payer: COMMERCIAL

## 2022-04-14 ENCOUNTER — HOSPITAL ENCOUNTER (OUTPATIENT)
Dept: GENERAL RADIOLOGY | Age: 42
Discharge: HOME | End: 2022-04-16
Payer: COMMERCIAL

## 2022-04-14 VITALS
SYSTOLIC BLOOD PRESSURE: 116 MMHG | BODY MASS INDEX: 24.73 KG/M2 | HEART RATE: 76 BPM | DIASTOLIC BLOOD PRESSURE: 76 MMHG | HEIGHT: 61 IN | WEIGHT: 131 LBS

## 2022-04-14 DIAGNOSIS — S52.502D CLOSED FRACTURE OF DISTAL END OF LEFT RADIUS WITH ROUTINE HEALING, UNSPECIFIED FRACTURE MORPHOLOGY, SUBSEQUENT ENCOUNTER: Primary | ICD-10-CM

## 2022-04-14 DIAGNOSIS — S52.502A CLOSED FRACTURE OF DISTAL END OF LEFT RADIUS, INITIAL ENCOUNTER: ICD-10-CM

## 2022-04-14 PROCEDURE — 73110 X-RAY EXAM OF WRIST: CPT

## 2022-04-14 PROCEDURE — 99213 OFFICE O/P EST LOW 20 MIN: CPT | Performed by: FAMILY MEDICINE

## 2022-04-14 NOTE — PROGRESS NOTES
Subjective:      Patient ID: Sri Wesley is a 38 y.o.  female. Chief Complaint   Patient presents with    Wrist Injury     re ck left wrist fracture     Fracture Follow-up  Patient here for follow up of a left forearm (distal radius) fracture. The injury occurred 9 weeks ago. She reports no problems with the cast or injury, and no problems with swelling, numbness, or tingling in her left wrist.     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 Vaping Use    Vaping Use: Never used   Substance and Sexual Activity    Alcohol use: Yes     Alcohol/week: 0.0 standard drinks     Comment: RARE    Drug use: No     Types: Cocaine     Comment: HAS BEEN OFF CRACK COCAINE FOR SEVERAL YEARS    Sexual activity: Yes     Partners: Male     Birth control/protection: Surgical     Comment:       @ROS@    Objective:   Left Hand Exam     Tenderness   The patient is experiencing no tenderness. Range of Motion   Wrist   Extension: normal   Flexion: 40   Pronation: normal   Supination: normal     Muscle Strength   The patient has normal left wrist strength. Tests   Phalens sign: positive  Tinel's sign (median nerve): positive  Finkelstein's test: negative    Other   Erythema: absent  Scars: absent  Sensation: normal  Pulse: present    Comments:  XR WRIST LEFT (MIN 3 VIEWS)    Result Date: 3/17/2022  No fractures noted on today's examination                   Assessment:     1. Closed fracture of distal end of left radius with routine healing, unspecified fracture morphology, subsequent encounter      Plan:      At this point patient is completely healed from an orthopedic standpoint her fracture line is no longer visible we will have her follow-up with us otherwise as needed.   Patient voiced understanding agreement

## 2022-05-03 ENCOUNTER — TELEPHONE (OUTPATIENT)
Dept: FAMILY MEDICINE CLINIC | Age: 42
End: 2022-05-03

## 2022-05-03 NOTE — TELEPHONE ENCOUNTER
----- Message from Zhen Stern sent at 5/3/2022  8:22 AM EDT -----  Subject: Appointment Request    Reason for Call: Semi-Routine Cough, Cold Symptoms    QUESTIONS  Type of Appointment? New Patient/New to Provider  Reason for appointment request? Other - Pt would like to be seen for the   sinus infection at appt. Additional Information for Provider? Patient Screened RED  Symptomatic   for \"Sinus Infection\" Patient has an appt on Thursday and would like to be   seen for the sinus infection at that time. ---------------------------------------------------------------------------  --------------  Ramsey Blocker INFO  What is the best way for the office to contact you? OK to leave message on   voicemail  Preferred Call Back Phone Number? [phone]  ---------------------------------------------------------------------------  --------------  SCRIPT ANSWERS  Relationship to Patient? Self  Are you currently unable to finish sentences due to any difficulty   breathing? No  Are you unable to swallow liquids? No  Are you having fevers (100.4 or greater), chills, or sweats? No  Do you have COPD, asthma or a chronic lung condition? No  Have your symptoms been present for more than 5 days? No  Have you recently (14 days) been seen by a provider for this issue? No  Have you been diagnosed with, awaiting test results for, or told that you   are suspected of having COVID-19 (Coronavirus)? (If patient has tested   negative or was tested as a requirement for work, school, or travel and   not based on symptoms, answer no)? No  Within the past 10 days have you developed any of the following symptoms   (answer no if symptoms have been present longer than 10 days or began   more than 10 days ago)?  Fever or Chills, Cough, Shortness of breath or   difficulty breathing, Loss of taste or smell, Sore throat, Nasal   congestion, Sneezing or runny nose, Fatigue or generalized body aches   (answer no if pain is specific to a body part e.g. back pain), Diarrhea,   Headache?  Yes

## 2022-05-05 ENCOUNTER — OFFICE VISIT (OUTPATIENT)
Dept: OTOLARYNGOLOGY | Age: 42
End: 2022-05-05
Payer: COMMERCIAL

## 2022-05-05 ENCOUNTER — HOSPITAL ENCOUNTER (OUTPATIENT)
Dept: LAB | Age: 42
Discharge: HOME | End: 2022-05-05
Payer: COMMERCIAL

## 2022-05-05 VITALS
DIASTOLIC BLOOD PRESSURE: 78 MMHG | RESPIRATION RATE: 18 BRPM | HEIGHT: 61 IN | OXYGEN SATURATION: 92 % | HEART RATE: 65 BPM | BODY MASS INDEX: 26.43 KG/M2 | WEIGHT: 140 LBS | SYSTOLIC BLOOD PRESSURE: 110 MMHG

## 2022-05-05 DIAGNOSIS — J30.9 ALLERGIC RHINITIS, UNSPECIFIED SEASONALITY, UNSPECIFIED TRIGGER: ICD-10-CM

## 2022-05-05 DIAGNOSIS — J34.89 SINUS PRESSURE: ICD-10-CM

## 2022-05-05 DIAGNOSIS — R09.82 POSTNASAL DRIP: ICD-10-CM

## 2022-05-05 DIAGNOSIS — J30.9 ALLERGIC RHINITIS, UNSPECIFIED SEASONALITY, UNSPECIFIED TRIGGER: Primary | ICD-10-CM

## 2022-05-05 DIAGNOSIS — J34.89 NASAL OBSTRUCTION: ICD-10-CM

## 2022-05-05 DIAGNOSIS — J32.9 CHRONIC SINUSITIS, UNSPECIFIED LOCATION: ICD-10-CM

## 2022-05-05 PROCEDURE — 31231 NASAL ENDOSCOPY DX: CPT | Performed by: OTOLARYNGOLOGY

## 2022-05-05 PROCEDURE — 99204 OFFICE O/P NEW MOD 45 MIN: CPT | Performed by: OTOLARYNGOLOGY

## 2022-05-05 PROCEDURE — 82785 ASSAY OF IGE: CPT

## 2022-05-05 PROCEDURE — 36415 COLL VENOUS BLD VENIPUNCTURE: CPT

## 2022-05-05 PROCEDURE — 86003 ALLG SPEC IGE CRUDE XTRC EA: CPT

## 2022-05-05 RX ORDER — FLUNISOLIDE 0.25 MG/ML
SOLUTION NASAL
Qty: 1 EACH | Refills: 1 | Status: SHIPPED | OUTPATIENT
Start: 2022-05-05

## 2022-05-05 RX ORDER — LEVOCETIRIZINE DIHYDROCHLORIDE 5 MG/1
5 TABLET, FILM COATED ORAL DAILY
Qty: 90 TABLET | Refills: 1 | Status: ON HOLD
Start: 2022-05-05 | End: 2022-07-25 | Stop reason: HOSPADM

## 2022-05-05 RX ORDER — CEPHALEXIN 500 MG/1
500 CAPSULE ORAL 2 TIMES DAILY
Qty: 14 CAPSULE | Refills: 0 | Status: SHIPPED | OUTPATIENT
Start: 2022-05-05 | End: 2022-05-12

## 2022-05-05 NOTE — PROGRESS NOTES
2022 8:59 AM HAN Rees (:  1980) is a 38 y.o. female,New patient, here for evaluation of the following chief complaint(s):  Sinus Problem (nw- chronic pansinusitis- ref Veatrice Cirri)      ASSESSMENT/PLAN:  1. Allergic rhinitis, unspecified seasonality, unspecified trigger    2. Chronic sinusitis, unspecified location    3. Sinus pressure    4. Nasal obstruction    5. Postnasal drip      1. Allergic rhinitis, unspecified seasonality, unspecified trigger  -     Allergen, Region 5 Respiratory Panel; Future  2. Chronic sinusitis, unspecified location  3. Sinus pressure  4. Nasal obstruction  5. Postnasal drip    Refill Xyzal  Allergy test  Flunisolide in the morning time  NeilMed at nighttime  Follow-up in a month  CT sinus if no improvement    No follow-ups on file. SUBJECTIVE/OBJECTIVE:  HPI   37yo woman referred for chronic sinusitis. She takes singulair, protonix, symbicort, xyzal.  She states that she has had sinus symptoms on and off for many years. She has been treated with antibiotics for recurrent acute sinusitis about 12 times last year. Her symptoms improve on antibiotics but then returned. Her baseline symptoms include forehead and maxillary sinus pressure, postnasal drip and nasal obstruction and fullness in her ears. The symptoms magnify when she has a sinus infection. She has been feeling ill for several days and feels like she has another sinus infection today. She has been allergy tested about 25 years ago with prescription scratch testing on the back and had some positives. 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 She has been on Flonase in the past but was bothered by seemingly worsening postnasal drip.   She is requesting a refill on Xyzal.    Past Medical History:   Diagnosis Date    Atrophic vaginitis 3/10/2016    Chronic fatigue     Constipation     Endometriosis     h/o endometriosis for which she had a hysterectomy    Genital herpes     Hyperlipidemia     Migraine     migraine cephalgia    Neurocardiogenic syncope     Ovarian cyst     Pelvic inflammatory disease (PID)     Sinusitis, chronic     chronic sinusitis probably related to tobacco abuse    Tobacco abuse     Vitamin D deficiency      Past Surgical History:   Procedure Laterality Date    APPENDECTOMY      LAPAROSCOPY      X3 before the hysterectomy    OTHER SURGICAL HISTORY  2016    lump removed lower right leg    KRISTIN AND BSO  2006    total with bilateral salpingo-oophorectomy    WISDOM TOOTH EXTRACTION       Social History     Tobacco History     Smoking Status  Current Every Day Smoker Smoking Start Date  1/1/1998 Smoking Frequency  1 pack/day for 15 years (15 pk yrs) Smoking Tobacco Type  Cigarettes    Smokeless Tobacco Use  Never Used    Tobacco Comment  Will see PCP PRN when ready to quit.             Alcohol History     Alcohol Use Status  Yes Drinks/Week  0 Standard drinks or equivalent per week Amount  0.0 standard drinks of alcohol/wk Comment  RARE          Drug Use     Drug Use Status  No Comment  HAS BEEN OFF CRACK COCAINE FOR SEVERAL YEARS          Sexual Activity     Sexually Active  Yes Partners  Male Birth Control/Protection  Surgical Comment                Family History   Problem Relation Age of Onset    Heart Disease Mother         heart murmur/valvular heart disease    Other Mother         family h/o diabetes   Chance Migraines Mother         runs in females on her side of family   Chance Stroke Mother 62    Hypertension Mother    Chance Parkinsonism Mother     Depression Mother     Other Father         family h/o diabetes    Diabetes Maternal Grandmother         diabetes    Diabetes Maternal Grandfather         diabetes    Cancer Paternal Grandfather 76        Acute Leukemia and infection/sudden death    Diabetes Maternal Uncle         Type 2     Current Outpatient Medications   Medication Instructions    albuterol sulfate HFA (PROVENTIL HFA) 108 (90 Base) MCG/ACT inhaler 2 puffs, Inhalation, EVERY 4 HOURS PRN    albuterol sulfate HFA (VENTOLIN HFA) 108 (90 Base) MCG/ACT inhaler 2 puffs, Inhalation, 4 TIMES DAILY PRN    BLACK COHOSH ROOT 600 mg, 2 TIMES DAILY PRN    budesonide-formoterol (SYMBICORT) 160-4.5 MCG/ACT AERO 1 puff, Inhalation, 2 TIMES DAILY    busPIRone (BUSPAR) 5 MG tablet TAKE ONE TABLET BY MOUTH TWICE A DAY    cephALEXin (KEFLEX) 500 mg, Oral, 2 TIMES DAILY    citalopram (CELEXA) 40 mg, Oral, DAILY    diphenhydrAMINE (BENADRYL) 25 mg, Oral, NIGHTLY PRN    doxycycline monohydrate (MONODOX) 100 MG capsule No dose, route, or frequency recorded.     fludrocortisone (FLORINEF) 0.1 mg, Oral, DAILY    flunisolide (NASALIDE) 25 MCG/ACT (0.025%) SOLN 2 sprays into both nostrils once a day    levocetirizine (XYZAL) 5 mg, Oral, DAILY    metoprolol tartrate (LOPRESSOR) 25 MG tablet TAKE ONE TABLET BY MOUTH TWICE A DAY    midodrine (PROAMATINE) 5 mg, Oral, 3 TIMES DAILY    montelukast (SINGULAIR) 10 MG tablet TAKE ONE TABLET BY MOUTH EVERY EVENING    Multiple Vitamins-Minerals (MULTIVITAMIN PO) 1 tablet, DAILY    nicotine (NICODERM CQ) 14 MG/24HR 1 patch, TransDERmal, EVERY 24 HOURS    nicotine (NICODERM CQ) 21 MG/24HR 1 patch, TransDERmal, EVERY 24 HOURS    nicotine (NICODERM CQ) 7 MG/24HR 1 patch, TransDERmal, EVERY 24 HOURS    oxybutynin (DITROPAN XL) 10 mg, Oral, DAILY    pantoprazole (PROTONIX) 40 MG tablet TAKE ONE TABLET BY MOUTH TWICE A DAY    predniSONE (DELTASONE) 10 mg, Oral, DAILY    simvastatin (ZOCOR) 20 MG tablet TAKE ONE TABLET BY MOUTH ONCE NIGHTLY    Spacer/Aero-Holding Chambers MARIA DE JESUS 1 Device, Does not apply, PRN    traZODone (DESYREL) 50 mg, Oral, NIGHTLY PRN    vitamin D (CHOLECALCIFEROL) 1000 UNIT TABS tablet 2 tablets, DAILY     Allergies   Allergen Reactions    Amoxicillin Nausea Only     GI UPSET    Other      seaosanal allergies      Pcn [Penicillins] Nausea Only GI UPSET       ENT ROS: positive for - sinus pain    General: The patient is found to be alert and normally responsive female. Hearing: grossly normal   Voice: Clear   Skin: The skin has normal colour and turgor. Face: The facial contour is symmetric at rest and with movement. Ears: The pinnae have normal contours. AD: EAC clear, TM intact, no effusion/erythema/retraction   AS: EAC clear, TM intact, no effusion/erythema/retraction   Eye: The ocular movements are full and symmetric, the conjunctiva is unremarkable; sclera are anicteric, pupillary response is symmetric. No nystagmus is found. Nose:   The external nasal contour is normal  The nasal mucosa is mildly inflamed  The nasal septum is straight. The turbinates have a normal appearance  The nares are patent without evidence of polyposis   Oral cavity:   The dentition is healthy. The oral mucosa is without lesions;  the tongue is symmetric with full mobility and is without fasciculation. The soft palate is symmetric. The oropharynx is unremarkable. Neck: The neck has a normal contour; no masses are found on palpation    Fiberoptic examination of the upper airway using scope was conducted after first applying topical phenylephrine (1%) and lidocaine (4%) to the bilateral nasal cavity by spray. The endoscope was inserted and advanced through the bilateral side of the nose examining the mucosa and nasal structures. Right:  Inferior turbinate enlarged, middle meatus clear, no polyps or purulence, excess thin mucus  Left:  Inferior turbinate normal, middle meatus clear, no polyps or purulence  Nasopharynx clear, ET patent, adenoid small. Septum midline. An electronic signature was used to authenticate this note.     --Nita Acevedo MD     5/5/2022 8:59 AM EDT

## 2022-05-12 ENCOUNTER — TELEPHONE (OUTPATIENT)
Dept: FAMILY MEDICINE CLINIC | Age: 42
End: 2022-05-12

## 2022-05-13 LAB
2000687N OAK TREE IGE: <0.1 KU/L (ref 0–0.34)
ALLERGEN BERMUDA GRASS IGE: <0.1 KU/L (ref 0–0.34)
ALLERGEN BIRCH IGE: <0.1 KU/L (ref 0–0.34)
ALLERGEN DOG DANDER IGE: <0.1 KU/L (ref 0–0.34)
ALLERGEN GERMAN COCKROACH IGE: <0.1 KU/L (ref 0–0.34)
ALLERGEN HORMODENDRUM IGE: <0.1 KUL/L (ref 0–0.34)
ALLERGEN MOUSE EPITHELIA IGE: <0.1 KU/L (ref 0–0.34)
ALLERGEN PECAN TREE IGE: <0.1 KU/L (ref 0–0.34)
ALLERGEN PIGWEED ROUGH IGE: <0.1 KU/L (ref 0–0.34)
ALLERGEN SHEEP SORREL (W18) IGE: <0.1 KU/L (ref 0–0.34)
ALLERGEN TREE SYCAMORE: <0.1 KU/L (ref 0–0.34)
ALLERGEN WALNUT TREE IGE: <0.1 KU/L (ref 0–0.34)
ALLERGEN WHITE MULBERRY TREE, IGE: <0.1 KU/L (ref 0–0.34)
ALLERGEN, TREE, WHITE ASH IGE: <0.1 KU/L (ref 0–0.34)
ALTERNARIA ALTERNATA: <0.1 KU/L (ref 0–0.34)
ASPERGILLUS FUMIGATUS: <0.1 KU/L (ref 0–0.34)
CAT DANDER ANTIBODY: <0.1 KU/L (ref 0–0.34)
COTTONWOOD TREE: <0.1 KU/L (ref 0–0.34)
D. FARINAE: <0.1 KU/L (ref 0–0.34)
D. PTERONYSSINUS: <0.1 KU/L (ref 0–0.34)
ELM TREE: <0.1 KU/L (ref 0–0.34)
IGE: 50 IU/ML
MAPLE/BOXELDER TREE: <0.1 KU/L (ref 0–0.34)
MOUNTAIN CEDAR TREE: <0.1 KU/L (ref 0–0.34)
MUCOR RACEMOSUS: <0.1 KU/L (ref 0–0.34)
P. NOTATUM: <0.1 KU/L (ref 0–0.34)
RUSSIAN THISTLE: <0.1 KU/L (ref 0–0.34)
SHORT RAGWD(A ARTEMIS.) IGE: <0.1 KU/L (ref 0–0.34)
TIMOTHY GRASS: <0.1 KU/L (ref 0–0.34)

## 2022-05-13 NOTE — TELEPHONE ENCOUNTER
[facility] Transitions Initial Follow Up Call    Outreach made within 2 business days of discharge: Yes    Patient: Sabrina Hobson Patient : 1980   MRN: [id number]  Reason for Admission: pneumonia  Discharge Date:         Spoke with: magno     Discharge department/facility: [facility] Interactive Patient Contact:  Was patient able to fill all prescriptions: Yes  Was patient instructed to bring all medications to the follow-up visit: Yes  Is patient taking all medications as directed in the discharge summary?  Yes  Does patient understand their discharge instructions: Yes  Does patient have questions or concerns that need addressed prior to 7-14 day follow up office visit: yes - states coughing so hard a lot of times has accidents due to coughing so hard tessalon pearles not helping    Scheduled appointment with PCP within 7-14 days    Follow Up  Future Appointments   Date Time Provider Nabil Coulter   2022 10:00 AM Sheryle Merlin, [place] [facility]   2022  8:40 AM MD CHANTEL Osman [facility]   [facility]   2023  8:00 AM MD SAMY Cleveland [facility]       Scooter Ledezma LPN Reviewed MRI report. Normal MRI brain. Possible cyst or scarring in one of the tonsils, \"palatine tonsil\". Wrote message to patient, follow up with PCP.

## 2022-05-17 ENCOUNTER — OFFICE VISIT (OUTPATIENT)
Dept: FAMILY MEDICINE CLINIC | Age: 42
End: 2022-05-17
Payer: COMMERCIAL

## 2022-05-17 VITALS
HEIGHT: 61 IN | WEIGHT: 131.8 LBS | DIASTOLIC BLOOD PRESSURE: 78 MMHG | TEMPERATURE: 97.6 F | SYSTOLIC BLOOD PRESSURE: 122 MMHG | HEART RATE: 76 BPM | BODY MASS INDEX: 24.88 KG/M2 | OXYGEN SATURATION: 95 %

## 2022-05-17 DIAGNOSIS — J18.9 PNEUMONIA DUE TO INFECTIOUS ORGANISM, UNSPECIFIED LATERALITY, UNSPECIFIED PART OF LUNG: Primary | ICD-10-CM

## 2022-05-17 DIAGNOSIS — Z09 HOSPITAL DISCHARGE FOLLOW-UP: ICD-10-CM

## 2022-05-17 DIAGNOSIS — R06.2 WHEEZING: ICD-10-CM

## 2022-05-17 DIAGNOSIS — R06.02 SOB (SHORTNESS OF BREATH): ICD-10-CM

## 2022-05-17 DIAGNOSIS — Z23 NEED FOR PNEUMOCOCCAL VACCINATION: ICD-10-CM

## 2022-05-17 DIAGNOSIS — J40 BRONCHITIS: ICD-10-CM

## 2022-05-17 PROCEDURE — 99495 TRANSJ CARE MGMT MOD F2F 14D: CPT | Performed by: PHYSICIAN ASSISTANT

## 2022-05-17 PROCEDURE — 90677 PCV20 VACCINE IM: CPT | Performed by: PHYSICIAN ASSISTANT

## 2022-05-17 PROCEDURE — 1111F DSCHRG MED/CURRENT MED MERGE: CPT | Performed by: PHYSICIAN ASSISTANT

## 2022-05-17 ASSESSMENT — ENCOUNTER SYMPTOMS
SHORTNESS OF BREATH: 1
GASTROINTESTINAL NEGATIVE: 1
COUGH: 1

## 2022-05-17 NOTE — PROGRESS NOTES
She has not yet seen pulmonology. She denies any additional concerns or complaints today. Inpatient course: Discharge summary reviewed- see chart.     Interval history/Current status: reviewed    Patient Active Problem List   Diagnosis    Vitamin D deficiency    Sinusitis, chronic    Migraine    Constipation    Genital herpes    Neurocardiogenic syncope    Chronic fatigue    Tobacco abuse    Atrophic vaginitis    Mixed hyperlipidemia    Lower urinary tract symptoms       Medication list at time of discharge reviewed: Yes    Medications marked \"taking\" at this time  Outpatient Medications Marked as Taking for the 5/17/22 encounter (Office Visit) with PIERO Marley   Medication Sig Dispense Refill    flunisolide (NASALIDE) 25 MCG/ACT (0.025%) SOLN 2 sprays into both nostrils once a day 1 each 1    predniSONE (DELTASONE) 10 MG tablet Take 10 mg by mouth daily      nicotine (NICODERM CQ) 21 MG/24HR Place 1 patch onto the skin every 24 hours 45 patch 0    budesonide-formoterol (SYMBICORT) 160-4.5 MCG/ACT AERO Inhale 1 puff into the lungs 2 times daily 10.2 g 3    pantoprazole (PROTONIX) 40 MG tablet TAKE ONE TABLET BY MOUTH TWICE A  tablet 1    metoprolol tartrate (LOPRESSOR) 25 MG tablet TAKE ONE TABLET BY MOUTH TWICE A DAY (Patient taking differently: 50 mg 2 times daily TAKE ONE TABLET BY MOUTH TWICE A DAY) 180 tablet 1    simvastatin (ZOCOR) 20 MG tablet TAKE ONE TABLET BY MOUTH ONCE NIGHTLY 30 tablet 3    oxybutynin (DITROPAN XL) 10 MG extended release tablet Take 1 tablet by mouth daily 90 tablet 3    busPIRone (BUSPAR) 5 MG tablet TAKE ONE TABLET BY MOUTH TWICE A DAY 60 tablet 2    Spacer/Aero-Holding Chambers MARIA DE JESUS 1 Device by Does not apply route as needed (To be used with albuterol inhaler) 1 each 0    citalopram (CELEXA) 40 MG tablet Take 1 tablet by mouth daily 90 tablet 1    montelukast (SINGULAIR) 10 MG tablet TAKE ONE TABLET BY MOUTH EVERY EVENING 90 tablet 1    fludrocortisone (FLORINEF) 0.1 MG tablet Take 1 tablet by mouth daily 90 tablet 1    midodrine (PROAMATINE) 5 MG tablet Take 1 tablet by mouth 3 times daily 270 tablet 1    traZODone (DESYREL) 50 MG tablet Take 1 tablet by mouth nightly as needed for Sleep 90 tablet 1    albuterol sulfate HFA (PROVENTIL HFA) 108 (90 Base) MCG/ACT inhaler Inhale 2 puffs into the lungs every 4 hours as needed for Wheezing 1 Inhaler 0    diphenhydrAMINE (BENADRYL) 25 MG tablet Take 25 mg by mouth nightly as needed for Itching      vitamin D (CHOLECALCIFEROL) 1000 UNIT TABS tablet Take 2 tablets by mouth daily. gummies      Multiple Vitamins-Minerals (MULTIVITAMIN PO) Take 1 tablet by mouth daily.  BLACK COHOSH ROOT Take 600 mg by mouth 2 times daily as needed. FOR HOT FLASHES          Medications patient taking as of now reconciled against medications ordered at time of hospital discharge: Yes    Review of Systems   Constitutional: Positive for fatigue. HENT: Positive for congestion. Respiratory: Positive for cough and shortness of breath. Cardiovascular: Negative. Gastrointestinal: Negative. Objective:    /78 (Site: Left Upper Arm, Position: Sitting, Cuff Size: Medium Adult)   Pulse 76   Temp 97.6 °F (36.4 °C) (Tympanic)   Ht 5' 1\" (1.549 m)   Wt 131 lb 12.8 oz (59.8 kg)   LMP 08/01/2006   SpO2 95%   BMI 24.90 kg/m²   Physical Exam  HENT:      Head: Normocephalic. Right Ear: Tympanic membrane normal.      Left Ear: Tympanic membrane normal.      Mouth/Throat:      Mouth: Mucous membranes are moist.   Cardiovascular:      Rate and Rhythm: Normal rate. Pulmonary:      Effort: Pulmonary effort is normal.      Comments: Decreased breath sounds  Skin:     General: Skin is warm and dry. Neurological:      General: No focal deficit present. Mental Status: She is alert.    Psychiatric:         Mood and Affect: Mood normal.           An electronic signature was used to authenticate this note.  --PIERO Wright

## 2022-05-17 NOTE — LETTER
[facility] A department of [place] [address]  Phone: [phone]  Fax: [address], [place]        May 17, 2022     Patient: Bela Santiago   YOB: 1980   Date of Visit: 5/17/2022       To Whom It May Concern: It is my medical opinion that Jane Fragoso should remain out of work until after re-evaluation 5/23/2022. If you have any questions or concerns, please don't hesitate to call.     Sincerely,        PIERO Isbell

## 2022-05-23 ENCOUNTER — OFFICE VISIT (OUTPATIENT)
Dept: FAMILY MEDICINE CLINIC | Age: 42
End: 2022-05-23
Payer: COMMERCIAL

## 2022-05-23 VITALS
BODY MASS INDEX: 24.92 KG/M2 | HEART RATE: 66 BPM | SYSTOLIC BLOOD PRESSURE: 108 MMHG | HEIGHT: 62 IN | DIASTOLIC BLOOD PRESSURE: 80 MMHG | WEIGHT: 135.4 LBS | OXYGEN SATURATION: 95 %

## 2022-05-23 DIAGNOSIS — R22.1 NECK SWELLING: ICD-10-CM

## 2022-05-23 DIAGNOSIS — J18.9 PNEUMONIA DUE TO INFECTIOUS ORGANISM, UNSPECIFIED LATERALITY, UNSPECIFIED PART OF LUNG: Primary | ICD-10-CM

## 2022-05-23 DIAGNOSIS — J40 BRONCHITIS: ICD-10-CM

## 2022-05-23 DIAGNOSIS — Z72.0 TOBACCO ABUSE: ICD-10-CM

## 2022-05-23 PROCEDURE — 99213 OFFICE O/P EST LOW 20 MIN: CPT | Performed by: PHYSICIAN ASSISTANT

## 2022-05-23 ASSESSMENT — ENCOUNTER SYMPTOMS
DIFFICULTY BREATHING: 0
SHORTNESS OF BREATH: 0
RHINORRHEA: 0
GASTROINTESTINAL NEGATIVE: 1
COUGH: 1
CHEST TIGHTNESS: 0

## 2022-05-23 NOTE — PROGRESS NOTES
Subjective:      Patient ID: Bela Santiago is a 38 y.o. female. Pneumonia  She complains of cough. There is no chest tightness, difficulty breathing or shortness of breath. This is a chronic problem. The current episode started more than 1 year ago. The cough is non-productive. Pertinent negatives include no dyspnea on exertion or rhinorrhea. Her symptoms are alleviated by steroid inhaler and oral steroids. Her past medical history is significant for bronchitis and COPD. Review of Systems   Constitutional: Negative. HENT: Negative for rhinorrhea. Respiratory: Positive for cough. Negative for shortness of breath. Cardiovascular: Negative. Negative for dyspnea on exertion. Gastrointestinal: Negative. Objective:   Physical Exam  HENT:      Head: Normocephalic. Right Ear: Tympanic membrane normal.      Left Ear: Tympanic membrane normal.   Cardiovascular:      Rate and Rhythm: Normal rate. Pulmonary:      Effort: Pulmonary effort is normal.      Breath sounds: Normal breath sounds. Skin:     General: Skin is warm and dry. Neurological:      General: No focal deficit present. Mental Status: She is alert and oriented to person, place, and time. Psychiatric:         Mood and Affect: Mood normal.         Assessment:      1. Pneumonia due to infectious organism, unspecified laterality, unspecified part of lung    2. Bronchitis    3. Neck swelling    4. Tobacco abuse          Plan:      Patient states that she is ready to RTW. RTW note written. Continue maintenance inhalers. Tobacco cessation. Incentive spirometry. Schedule thyroid ultrasound. Keep appointments for PFT and pulmonology. Follow-up as planned for management of chronic medical conditions/sooner PRN.         PIERO Isbell

## 2022-05-23 NOTE — LETTER
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 If you have any questions or concerns, please don't hesitate to call.     Sincerely,        PIERO Denney

## 2022-05-27 ENCOUNTER — HOSPITAL ENCOUNTER (OUTPATIENT)
Dept: INTERVENTIONAL RADIOLOGY/VASCULAR | Age: 42
Discharge: HOME | End: 2022-05-29
Payer: COMMERCIAL

## 2022-05-27 DIAGNOSIS — R22.1 NECK SWELLING: ICD-10-CM

## 2022-05-27 PROCEDURE — 76536 US EXAM OF HEAD AND NECK: CPT

## 2022-06-20 ENCOUNTER — HOSPITAL ENCOUNTER (OUTPATIENT)
Dept: PULMONOLOGY | Age: 42
Discharge: HOME | End: 2022-06-20
Payer: COMMERCIAL

## 2022-06-20 DIAGNOSIS — R09.02 HYPOXIA: ICD-10-CM

## 2022-06-20 DIAGNOSIS — Z72.0 TOBACCO ABUSE: ICD-10-CM

## 2022-06-20 LAB
DLCO %PRED: NORMAL
DLCO PRED: NORMAL
DLCO/VA %PRED: NORMAL
DLCO/VA PRED: NORMAL
DLCO/VA: NORMAL
DLCO: NORMAL
EXPIRATORY TIME-POST: NORMAL
EXPIRATORY TIME: NORMAL
FEF 25-75% %CHNG: NORMAL
FEF 25-75% %PRED-POST: NORMAL
FEF 25-75% %PRED-PRE: NORMAL
FEF 25-75% PRED: NORMAL
FEF 25-75%-POST: NORMAL
FEF 25-75%-PRE: NORMAL
FEV1 %PRED-POST: NORMAL
FEV1 %PRED-PRE: NORMAL
FEV1 PRED: NORMAL
FEV1-POST: NORMAL
FEV1-PRE: NORMAL
FEV1/FVC %PRED-POST: NORMAL
FEV1/FVC %PRED-PRE: NORMAL
FEV1/FVC PRED: NORMAL
FEV1/FVC-POST: NORMAL
FEV1/FVC-PRE: NORMAL
FVC %PRED-POST: NORMAL
FVC %PRED-PRE: NORMAL
FVC PRED: NORMAL
FVC-POST: NORMAL
FVC-PRE: NORMAL
GAW %PRED: NORMAL
GAW PRED: NORMAL
GAW: NORMAL
IC %PRED: NORMAL
IC PRED: NORMAL
IC: NORMAL
MEP: NORMAL
MIP: NORMAL
MVV %PRED-PRE: NORMAL
MVV PRED: NORMAL
MVV-PRE: NORMAL
PEF %PRED-POST: NORMAL
PEF %PRED-PRE: NORMAL
PEF PRED: NORMAL
PEF%CHNG: NORMAL
PEF-POST: NORMAL
PEF-PRE: NORMAL
RAW %PRED: NORMAL
RAW PRED: NORMAL
RAW: NORMAL
RV %PRED: NORMAL
RV PRED: NORMAL
RV: NORMAL
SVC %PRED: NORMAL
SVC PRED: NORMAL
SVC: NORMAL
TLC %PRED: NORMAL
TLC PRED: NORMAL
TLC: NORMAL
VA %PRED: NORMAL
VA PRED: NORMAL
VA: NORMAL
VTG %PRED: NORMAL
VTG PRED: NORMAL
VTG: NORMAL

## 2022-06-20 PROCEDURE — 94060 EVALUATION OF WHEEZING: CPT

## 2022-06-20 PROCEDURE — 6370000000 HC RX 637 (ALT 250 FOR IP): Performed by: INTERNAL MEDICINE

## 2022-06-20 PROCEDURE — 94640 AIRWAY INHALATION TREATMENT: CPT

## 2022-06-20 PROCEDURE — 94726 PLETHYSMOGRAPHY LUNG VOLUMES: CPT

## 2022-06-20 PROCEDURE — 94729 DIFFUSING CAPACITY: CPT

## 2022-06-20 RX ORDER — ALBUTEROL SULFATE 90 UG/1
4 AEROSOL, METERED RESPIRATORY (INHALATION) ONCE
Status: COMPLETED | OUTPATIENT
Start: 2022-06-20 | End: 2022-06-20

## 2022-06-20 RX ADMIN — ALBUTEROL SULFATE 4 PUFF: 90 AEROSOL, METERED RESPIRATORY (INHALATION) at 10:04

## 2022-06-27 NOTE — PROCEDURES
[facility] [address]                 [address] [place] [id number]                               PULMONARY FUNCTION    PATIENT NAME: Pat Grajeda                :        1980  MED REC NO:   [id number]                             ROOM:  ACCOUNT NO:   [de-identified]                           ADMIT DATE: 2022  PROVIDER:     Yoana Mg    DATE OF PROCEDURE:  2022    The patient's spirometry shows a severe restrictive defect. FEV1 of 33%  predicted with 12% bronchodilator change to 37% predicted from 0.90  liters to 1.02 liters, not meeting ATS criteria for bronchodilation. FVC 28% predicted with bronchodilator change to 36% predicted. This was  28% bronchodilator change and meets ATS criteria. FEV1/FVC ratio 94,  postbronchodilator 82. Total lung capacity 65% predicted and % predicted consistent with  mild hyperinflation and diffusion capacity uncorrected 63% predicted,  corrected for alveolar volume 91% predicted without increase in airway  resistance. FINAL IMPRESSION:  This study shows combined obstructive and restrictive  ventilatory dysfunction with severe ventilatory dysfunction showing a  bronchodilator response. There is mild hyperinflation and diffusion  capacity is moderately decreased, which could be due to underlying  interstitial lung disease or pulmonary emphysema or pulmonary vascular  disease or anemia. Clinical correlation advised. Luz Maria Turcios    D: 2022 22:34:19       T: 2022 0:10:12     /[email or web address]  Job#: [id number]     Doc#: [id number]    CC:   Levar Raymond

## 2022-06-30 ENCOUNTER — TELEPHONE (OUTPATIENT)
Dept: FAMILY MEDICINE CLINIC | Age: 42
End: 2022-06-30

## 2022-06-30 DIAGNOSIS — D64.9 HEMOGLOBIN LOW: ICD-10-CM

## 2022-06-30 DIAGNOSIS — R06.00 DYSPNEA, UNSPECIFIED TYPE: Primary | ICD-10-CM

## 2022-07-02 ENCOUNTER — OFFICE VISIT (OUTPATIENT)
Dept: PRIMARY CARE CLINIC | Age: 42
End: 2022-07-02
Payer: COMMERCIAL

## 2022-07-02 VITALS
WEIGHT: 133 LBS | DIASTOLIC BLOOD PRESSURE: 74 MMHG | BODY MASS INDEX: 24.72 KG/M2 | TEMPERATURE: 98 F | HEART RATE: 78 BPM | OXYGEN SATURATION: 90 % | SYSTOLIC BLOOD PRESSURE: 120 MMHG

## 2022-07-02 DIAGNOSIS — J01.00 ACUTE NON-RECURRENT MAXILLARY SINUSITIS: Primary | ICD-10-CM

## 2022-07-02 DIAGNOSIS — R05.9 COUGH: ICD-10-CM

## 2022-07-02 DIAGNOSIS — R06.2 WHEEZES: ICD-10-CM

## 2022-07-02 DIAGNOSIS — J32.4 CHRONIC PANSINUSITIS: ICD-10-CM

## 2022-07-02 PROCEDURE — 99213 OFFICE O/P EST LOW 20 MIN: CPT | Performed by: NURSE PRACTITIONER

## 2022-07-02 RX ORDER — PREDNISONE 20 MG/1
20 TABLET ORAL 2 TIMES DAILY
Qty: 10 TABLET | Refills: 0 | Status: SHIPPED | OUTPATIENT
Start: 2022-07-02 | End: 2022-07-07

## 2022-07-02 RX ORDER — DOXYCYCLINE HYCLATE 100 MG
100 TABLET ORAL 2 TIMES DAILY
Qty: 20 TABLET | Refills: 0 | Status: SHIPPED | OUTPATIENT
Start: 2022-07-02 | End: 2022-07-12

## 2022-07-02 ASSESSMENT — ENCOUNTER SYMPTOMS
SORE THROAT: 0
COUGH: 1
SHORTNESS OF BREATH: 0
WHEEZING: 1
RHINORRHEA: 0

## 2022-07-02 ASSESSMENT — PATIENT HEALTH QUESTIONNAIRE - PHQ9
SUM OF ALL RESPONSES TO PHQ9 QUESTIONS 1 & 2: 0
SUM OF ALL RESPONSES TO PHQ QUESTIONS 1-9: 0
1. LITTLE INTEREST OR PLEASURE IN DOING THINGS: 0
SUM OF ALL RESPONSES TO PHQ QUESTIONS 1-9: 0
2. FEELING DOWN, DEPRESSED OR HOPELESS: 0

## 2022-07-02 NOTE — LETTER
[address] [facility] department of [place] [address]  Phone: [phone]  Fax: [phone]        OSEI Escudero CNP      July 2, 2022    Patient:   Isai Mclean  Date of Birth   1980  Date of visit   7/2/2022        To Whom it May Concern:      Ene Cao was seen in my clinic on 7/2/2022. Please excuse from work 06/30/22 and 07/01/22. If you have any questions or concerns, please don't hesitate to call.       Sincerely,      OSEI Escudero CNP

## 2022-07-02 NOTE — PROGRESS NOTES
[facility]             [address], [address]                        Telephone [phone]             Fax [phone]     Song Dodson  1980  [id number]   Date of visit:  7/2/2022    Subjective:    Song Dodson is a 38 y.o.  female who presents to [facility] today (7/2/2022) for evaluation of:    Chief Complaint   Patient presents with    Cough     wheeze 2 weeks        Cough  This is a new problem. The current episode started 1 to 4 weeks ago (X 2 weeks). The problem has been gradually worsening. The problem occurs every few minutes. The cough is non-productive. Associated symptoms include ear pain (right ear intermittent), headaches, nasal congestion, postnasal drip and wheezing. Pertinent negatives include no chest pain, fever, myalgias, rash, rhinorrhea, sore throat or shortness of breath. Associated symptoms comments: Sinus pressure. The symptoms are aggravated by lying down. Treatments tried: robitussin, albuterol inhaler, symbicort. The treatment provided mild relief. Her past medical history is significant for environmental allergies. She did not fill the Xyzal from 05/05/22.     She has the following problem list:  Patient Active Problem List   Diagnosis    Vitamin D deficiency    Sinusitis, chronic    Migraine    Constipation    Genital herpes    Neurocardiogenic syncope    Chronic fatigue    Tobacco abuse    Atrophic vaginitis    Mixed hyperlipidemia    Lower urinary tract symptoms        Current medications are:  Current Outpatient Medications   Medication Sig Dispense Refill    doxycycline hyclate (VIBRA-TABS) 100 MG tablet Take 1 tablet by mouth 2 times daily for 10 days 20 tablet 0    predniSONE (DELTASONE) 20 MG tablet Take 1 tablet by mouth 2 times daily for 5 days 10 tablet 0    flunisolide (NASALIDE) 25 MCG/ACT (0.025%) SOLN 2 sprays into both nostrils once a day 1 each 1    budesonide-formoterol (SYMBICORT) 160-4.5 MCG/ACT AERO Inhale 1 puff into the lungs 2 times daily 10.2 g 3    pantoprazole (PROTONIX) 40 MG tablet TAKE ONE TABLET BY MOUTH TWICE A  tablet 1    metoprolol tartrate (LOPRESSOR) 25 MG tablet TAKE ONE TABLET BY MOUTH TWICE A DAY (Patient taking differently: 50 mg 2 times daily TAKE ONE TABLET BY MOUTH TWICE A DAY) 180 tablet 1    simvastatin (ZOCOR) 20 MG tablet TAKE ONE TABLET BY MOUTH ONCE NIGHTLY 30 tablet 3    oxybutynin (DITROPAN XL) 10 MG extended release tablet Take 1 tablet by mouth daily 90 tablet 3    busPIRone (BUSPAR) 5 MG tablet TAKE ONE TABLET BY MOUTH TWICE A DAY 60 tablet 2    Spacer/Aero-Holding Chambers MARIA DE JESUS 1 Device by Does not apply route as needed (To be used with albuterol inhaler) 1 each 0    citalopram (CELEXA) 40 MG tablet Take 1 tablet by mouth daily 90 tablet 1    montelukast (SINGULAIR) 10 MG tablet TAKE ONE TABLET BY MOUTH EVERY EVENING 90 tablet 1    fludrocortisone (FLORINEF) 0.1 MG tablet Take 1 tablet by mouth daily 90 tablet 1    midodrine (PROAMATINE) 5 MG tablet Take 1 tablet by mouth 3 times daily 270 tablet 1    traZODone (DESYREL) 50 MG tablet Take 1 tablet by mouth nightly as needed for Sleep 90 tablet 1    albuterol sulfate HFA (PROVENTIL HFA) 108 (90 Base) MCG/ACT inhaler Inhale 2 puffs into the lungs every 4 hours as needed for Wheezing 1 Inhaler 0    diphenhydrAMINE (BENADRYL) 25 MG tablet Take 25 mg by mouth nightly as needed for Itching      vitamin D (CHOLECALCIFEROL) 1000 UNIT TABS tablet Take 2 tablets by mouth daily. gummies      Multiple Vitamins-Minerals (MULTIVITAMIN PO) Take 1 tablet by mouth daily.  BLACK COHOSH ROOT Take 600 mg by mouth 2 times daily as needed.  FOR HOT FLASHES      levocetirizine (XYZAL) 5 MG tablet Take 1 tablet by mouth daily (Patient not taking: Reported on 7/2/2022) 90 tablet 1    nicotine (NICODERM CQ) 21 MG/24HR Place 1 patch onto the skin every 24 hours 45 patch 0    nicotine (NICODERM CQ) 14 MG/24HR Place 1 patch onto the skin every 24 hours for 15 days 15 patch 0    nicotine (NICODERM CQ) 7 MG/24HR Place 1 patch onto the skin every 24 hours for 15 days 15 patch 0     No current facility-administered medications for this visit. She is allergic to amoxicillin, other, and pcn [penicillins]. .    She  reports that she has been smoking cigarettes. She started smoking about 24 years ago. She has a 15.00 pack-year smoking history. She has never used smokeless tobacco.      Objective:    Vitals:    07/02/22 1137   BP: 120/74   Site: Left Upper Arm   Position: Sitting   Cuff Size: Large Adult   Pulse: 78   Temp: 98 °F (36.7 °C)   TempSrc: Tympanic   SpO2: 90%   Weight: 133 lb (60.3 kg)     Body mass index is 24.72 kg/m². Review of Systems   Constitutional: Negative for fever. HENT: Positive for ear pain (right ear intermittent) and postnasal drip. Negative for rhinorrhea and sore throat. Respiratory: Positive for cough and wheezing. Negative for shortness of breath. Cardiovascular: Negative for chest pain. Musculoskeletal: Negative for myalgias. Skin: Negative for rash. Allergic/Immunologic: Positive for environmental allergies. Neurological: Positive for headaches. Physical Exam  Vitals and nursing note reviewed. Constitutional:       Appearance: She is well-developed. HENT:      Head: Normocephalic. Jaw: There is normal jaw occlusion. Right Ear: Ear canal and external ear normal. A middle ear effusion is present. Left Ear: Tympanic membrane, ear canal and external ear normal.      Nose: Congestion present. Right Turbinates: Swollen and pale. Left Turbinates: Swollen and pale. Right Sinus: Maxillary sinus tenderness present. No frontal sinus tenderness. Left Sinus: Maxillary sinus tenderness present. No frontal sinus tenderness. Mouth/Throat:      Lips: Pink.       Mouth: Mucous membranes are moist.      Pharynx: Oropharynx is clear. Uvula midline. Eyes:      Pupils: Pupils are equal, round, and reactive to light. Cardiovascular:      Rate and Rhythm: Normal rate and regular rhythm. Heart sounds: Normal heart sounds. Pulmonary:      Effort: Pulmonary effort is normal.      Breath sounds: Normal air entry. Examination of the right-upper field reveals wheezing. Examination of the left-upper field reveals wheezing. Examination of the right-middle field reveals wheezing. Examination of the left-middle field reveals wheezing. Wheezing (expiratory) present. Comments: Dry cough noted  Musculoskeletal:      Cervical back: Normal range of motion and neck supple. Lymphadenopathy:      Cervical: No cervical adenopathy. Skin:     General: Skin is warm and dry. Neurological:      General: No focal deficit present. Mental Status: She is alert and oriented to person, place, and time. Psychiatric:         Behavior: Behavior normal.         Thought Content: Thought content normal.       Assessment and Plan:    No results found for this visit on 07/02/22. Diagnosis Orders   1. Acute non-recurrent maxillary sinusitis  doxycycline hyclate (VIBRA-TABS) 100 MG tablet   2. Cough  predniSONE (DELTASONE) 20 MG tablet   3. Wheezes  predniSONE (DELTASONE) 20 MG tablet     Complete full course of antibiotic. Take prednisone as directed. Continue inhalers as directed. Continue Flunisolide daily for sinus symptoms. she was also encouraged to use tylenol or ibuprofen for pain/fever. Increase water intake. Use cool mist humidifier at bedtime. Use nasal saline flush as needed. Good hand hygiene. she was instructed to return if there is no improvement or symptoms worsen. The use, risks, benefits, and side effects of prescribed or recommended medications were discussed. All questions were answered and the patient/caregiver voiced understanding.     No orders of the defined types were placed in this encounter.         Electronically signed by OSEI Logan CNP on 7/2/22 at 11:50 AM EDT

## 2022-07-02 NOTE — PATIENT INSTRUCTIONS
a hot, wet towel or a warm gel pack on your face 3 or 4 times a day for 5 to 10 minutes each time.  Try a decongestant nasal spray like oxymetazoline (Afrin). Do not use it for more than 3 days in a row. Using it for more than 3 days can make your congestion worse. When should you call for help? Call your doctor now or seek immediate medical care if:     You have new or worse swelling or redness in your face or around your eyes.      You have a new or higher fever. Watch closely for changes in your health, and be sure to contact your doctor if:     You have new or worse facial pain.      The mucus from your nose becomes thicker (like pus) or has new blood in it.      You are not getting better as expected. Where can you learn more? Go to https://[employer].ge. org and sign in to your [employer] account. Enter L914 in the [address] box to learn more about \"Sinusitis: Care Instructions. \"     If you do not have an account, please click on the \"Sign Up Now\" link. Current as of: September 8, 2021               Content Version: 13.3  © 2006-2022 Healthwise, Incorporated. Care instructions adapted under license by [facility] ([facility]). If you have questions about a medical condition or this instruction, always ask your healthcare professional. [address] any warranty or liability for your use of this information.

## 2022-07-05 RX ORDER — MONTELUKAST SODIUM 10 MG/1
TABLET ORAL
Qty: 90 TABLET | Refills: 0 | Status: SHIPPED | OUTPATIENT
Start: 2022-07-05

## 2022-07-08 ENCOUNTER — HOSPITAL ENCOUNTER (OUTPATIENT)
Dept: NON INVASIVE DIAGNOSTICS | Age: 42
Discharge: HOME | End: 2022-07-08
Payer: COMMERCIAL

## 2022-07-08 DIAGNOSIS — R06.00 DYSPNEA, UNSPECIFIED TYPE: ICD-10-CM

## 2022-07-08 LAB
LV EF: 65 %
LVEF MODALITY: NORMAL

## 2022-07-08 PROCEDURE — 93306 TTE W/DOPPLER COMPLETE: CPT

## 2022-07-22 ENCOUNTER — HOSPITAL ENCOUNTER (INPATIENT)
Age: 42
LOS: 3 days | Discharge: HOME | DRG: 192 | End: 2022-07-25
Attending: EMERGENCY MEDICINE | Admitting: INTERNAL MEDICINE
Payer: COMMERCIAL

## 2022-07-22 ENCOUNTER — TELEPHONE (OUTPATIENT)
Dept: FAMILY MEDICINE CLINIC | Age: 42
End: 2022-07-22

## 2022-07-22 ENCOUNTER — APPOINTMENT (OUTPATIENT)
Dept: GENERAL RADIOLOGY | Age: 42
DRG: 192 | End: 2022-07-22
Payer: COMMERCIAL

## 2022-07-22 DIAGNOSIS — J44.1 COPD EXACERBATION (HCC): Primary | ICD-10-CM

## 2022-07-22 DIAGNOSIS — R55 NEUROCARDIOGENIC SYNCOPE: ICD-10-CM

## 2022-07-22 LAB
ABSOLUTE EOS #: 0.1 K/UL (ref 0–0.44)
ABSOLUTE IMMATURE GRANULOCYTE: 0.11 K/UL (ref 0–0.3)
ABSOLUTE LYMPH #: 2.04 K/UL (ref 1.1–3.7)
ABSOLUTE MONO #: 1.35 K/UL (ref 0.1–1.2)
ALBUMIN SERPL-MCNC: 4.4 G/DL (ref 3.5–5.2)
ALBUMIN/GLOBULIN RATIO: 1.6 (ref 1–2.5)
ALP BLD-CCNC: 86 U/L (ref 35–104)
ALT SERPL-CCNC: 13 U/L (ref 5–33)
ANION GAP SERPL CALCULATED.3IONS-SCNC: 11 MMOL/L (ref 9–17)
AST SERPL-CCNC: 21 U/L
BASOPHILS # BLD: 0 % (ref 0–2)
BASOPHILS ABSOLUTE: 0.05 K/UL (ref 0–0.2)
BILIRUB SERPL-MCNC: 0.25 MG/DL (ref 0.3–1.2)
BUN BLDV-MCNC: 17 MG/DL (ref 6–20)
BUN/CREAT BLD: 20 (ref 9–20)
CALCIUM SERPL-MCNC: 9.6 MG/DL (ref 8.6–10.4)
CHLORIDE BLD-SCNC: 104 MMOL/L (ref 98–107)
CO2: 27 MMOL/L (ref 20–31)
CREAT SERPL-MCNC: 0.84 MG/DL (ref 0.5–0.9)
D-DIMER QUANTITATIVE: 0.35 MG/L FEU (ref 0–0.59)
EOSINOPHILS RELATIVE PERCENT: 1 % (ref 1–4)
GFR AFRICAN AMERICAN: >60 ML/MIN
GFR NON-AFRICAN AMERICAN: >60 ML/MIN
GFR SERPL CREATININE-BSD FRML MDRD: ABNORMAL ML/MIN/{1.73_M2}
GLUCOSE BLD-MCNC: 212 MG/DL (ref 65–105)
GLUCOSE BLD-MCNC: 229 MG/DL (ref 65–105)
GLUCOSE BLD-MCNC: 94 MG/DL (ref 70–99)
HCT VFR BLD CALC: 37.2 % (ref 36.3–47.1)
HEMOGLOBIN: 11.8 G/DL (ref 11.9–15.1)
IMMATURE GRANULOCYTES: 1 %
LACTIC ACID, SEPSIS: 0.6 MMOL/L (ref 0.5–1.9)
LACTIC ACID, SEPSIS: 0.9 MMOL/L (ref 0.5–1.9)
LYMPHOCYTES # BLD: 9 % (ref 24–43)
MAGNESIUM: 2.4 MG/DL (ref 1.6–2.6)
MCH RBC QN AUTO: 30.2 PG (ref 25.2–33.5)
MCHC RBC AUTO-ENTMCNC: 31.7 G/DL (ref 25.2–33.5)
MCV RBC AUTO: 95.1 FL (ref 82.6–102.9)
MONOCYTES # BLD: 6 % (ref 3–12)
NRBC AUTOMATED: 0 PER 100 WBC
PDW BLD-RTO: 13.8 % (ref 11.8–14.4)
PLATELET # BLD: 272 K/UL (ref 138–453)
PMV BLD AUTO: 10.7 FL (ref 8.1–13.5)
POTASSIUM SERPL-SCNC: 4.2 MMOL/L (ref 3.7–5.3)
RBC # BLD: 3.91 M/UL (ref 3.95–5.11)
SARS-COV-2, RAPID: NOT DETECTED
SEG NEUTROPHILS: 83 % (ref 36–65)
SEGMENTED NEUTROPHILS ABSOLUTE COUNT: 18.13 K/UL (ref 1.5–8.1)
SODIUM BLD-SCNC: 142 MMOL/L (ref 135–144)
SPECIMEN DESCRIPTION: NORMAL
TOTAL PROTEIN: 7.1 G/DL (ref 6.4–8.3)
TROPONIN, HIGH SENSITIVITY: 7 NG/L (ref 0–14)
WBC # BLD: 21.8 K/UL (ref 3.5–11.3)

## 2022-07-22 PROCEDURE — 6370000000 HC RX 637 (ALT 250 FOR IP): Performed by: EMERGENCY MEDICINE

## 2022-07-22 PROCEDURE — 83735 ASSAY OF MAGNESIUM: CPT

## 2022-07-22 PROCEDURE — 36415 COLL VENOUS BLD VENIPUNCTURE: CPT

## 2022-07-22 PROCEDURE — 83605 ASSAY OF LACTIC ACID: CPT

## 2022-07-22 PROCEDURE — 1200000000 HC SEMI PRIVATE

## 2022-07-22 PROCEDURE — 85379 FIBRIN DEGRADATION QUANT: CPT

## 2022-07-22 PROCEDURE — 2700000000 HC OXYGEN THERAPY PER DAY

## 2022-07-22 PROCEDURE — 82947 ASSAY GLUCOSE BLOOD QUANT: CPT

## 2022-07-22 PROCEDURE — 6360000002 HC RX W HCPCS: Performed by: INTERNAL MEDICINE

## 2022-07-22 PROCEDURE — 87040 BLOOD CULTURE FOR BACTERIA: CPT

## 2022-07-22 PROCEDURE — 2580000003 HC RX 258: Performed by: EMERGENCY MEDICINE

## 2022-07-22 PROCEDURE — 6360000002 HC RX W HCPCS: Performed by: EMERGENCY MEDICINE

## 2022-07-22 PROCEDURE — 2580000003 HC RX 258: Performed by: INTERNAL MEDICINE

## 2022-07-22 PROCEDURE — 87635 SARS-COV-2 COVID-19 AMP PRB: CPT

## 2022-07-22 PROCEDURE — 99285 EMERGENCY DEPT VISIT HI MDM: CPT

## 2022-07-22 PROCEDURE — 99222 1ST HOSP IP/OBS MODERATE 55: CPT | Performed by: INTERNAL MEDICINE

## 2022-07-22 PROCEDURE — 85025 COMPLETE CBC W/AUTO DIFF WBC: CPT

## 2022-07-22 PROCEDURE — 84484 ASSAY OF TROPONIN QUANT: CPT

## 2022-07-22 PROCEDURE — 71045 X-RAY EXAM CHEST 1 VIEW: CPT

## 2022-07-22 PROCEDURE — 96374 THER/PROPH/DIAG INJ IV PUSH: CPT

## 2022-07-22 PROCEDURE — 80053 COMPREHEN METABOLIC PANEL: CPT

## 2022-07-22 PROCEDURE — 6370000000 HC RX 637 (ALT 250 FOR IP): Performed by: INTERNAL MEDICINE

## 2022-07-22 PROCEDURE — 93005 ELECTROCARDIOGRAM TRACING: CPT | Performed by: EMERGENCY MEDICINE

## 2022-07-22 PROCEDURE — 94640 AIRWAY INHALATION TREATMENT: CPT

## 2022-07-22 RX ORDER — MIDODRINE HYDROCHLORIDE 5 MG/1
5 TABLET ORAL
Status: DISCONTINUED | OUTPATIENT
Start: 2022-07-22 | End: 2022-07-25 | Stop reason: HOSPADM

## 2022-07-22 RX ORDER — DEXTROSE MONOHYDRATE 100 MG/ML
INJECTION, SOLUTION INTRAVENOUS CONTINUOUS PRN
Status: DISCONTINUED | OUTPATIENT
Start: 2022-07-22 | End: 2022-07-25 | Stop reason: HOSPADM

## 2022-07-22 RX ORDER — ACETAMINOPHEN 500 MG
1000 TABLET ORAL ONCE
Status: COMPLETED | OUTPATIENT
Start: 2022-07-22 | End: 2022-07-22

## 2022-07-22 RX ORDER — METOPROLOL TARTRATE 50 MG/1
50 TABLET, FILM COATED ORAL 2 TIMES DAILY
Status: DISCONTINUED | OUTPATIENT
Start: 2022-07-22 | End: 2022-07-25 | Stop reason: HOSPADM

## 2022-07-22 RX ORDER — LACTULOSE 10 G/15ML
20 SOLUTION ORAL 3 TIMES DAILY PRN
Status: DISCONTINUED | OUTPATIENT
Start: 2022-07-22 | End: 2022-07-25 | Stop reason: HOSPADM

## 2022-07-22 RX ORDER — OXYBUTYNIN CHLORIDE 5 MG/1
10 TABLET, EXTENDED RELEASE ORAL DAILY
Status: DISCONTINUED | OUTPATIENT
Start: 2022-07-22 | End: 2022-07-25 | Stop reason: HOSPADM

## 2022-07-22 RX ORDER — ACETAMINOPHEN 325 MG/1
650 TABLET ORAL EVERY 4 HOURS PRN
Status: DISCONTINUED | OUTPATIENT
Start: 2022-07-22 | End: 2022-07-25 | Stop reason: HOSPADM

## 2022-07-22 RX ORDER — INSULIN LISPRO 100 [IU]/ML
0-4 INJECTION, SOLUTION INTRAVENOUS; SUBCUTANEOUS
Status: DISCONTINUED | OUTPATIENT
Start: 2022-07-22 | End: 2022-07-25 | Stop reason: HOSPADM

## 2022-07-22 RX ORDER — PANTOPRAZOLE SODIUM 40 MG/1
40 TABLET, DELAYED RELEASE ORAL 2 TIMES DAILY
Status: DISCONTINUED | OUTPATIENT
Start: 2022-07-22 | End: 2022-07-25 | Stop reason: HOSPADM

## 2022-07-22 RX ORDER — ALBUTEROL SULFATE 2.5 MG/3ML
2.5 SOLUTION RESPIRATORY (INHALATION)
Status: DISCONTINUED | OUTPATIENT
Start: 2022-07-22 | End: 2022-07-25 | Stop reason: HOSPADM

## 2022-07-22 RX ORDER — SODIUM CHLORIDE 0.9 % (FLUSH) 0.9 %
10 SYRINGE (ML) INJECTION PRN
Status: DISCONTINUED | OUTPATIENT
Start: 2022-07-22 | End: 2022-07-25 | Stop reason: HOSPADM

## 2022-07-22 RX ORDER — NICOTINE 21 MG/24HR
1 PATCH, TRANSDERMAL 24 HOURS TRANSDERMAL EVERY 24 HOURS
Status: DISCONTINUED | OUTPATIENT
Start: 2022-07-22 | End: 2022-07-25 | Stop reason: HOSPADM

## 2022-07-22 RX ORDER — FLUTICASONE PROPIONATE 50 MCG
2 SPRAY, SUSPENSION (ML) NASAL DAILY PRN
Refills: 1 | Status: DISCONTINUED | OUTPATIENT
Start: 2022-07-22 | End: 2022-07-25 | Stop reason: HOSPADM

## 2022-07-22 RX ORDER — ZOLPIDEM TARTRATE 5 MG/1
5 TABLET ORAL NIGHTLY PRN
Status: DISCONTINUED | OUTPATIENT
Start: 2022-07-22 | End: 2022-07-25 | Stop reason: HOSPADM

## 2022-07-22 RX ORDER — MONTELUKAST SODIUM 10 MG/1
10 TABLET ORAL EVERY EVENING
Status: DISCONTINUED | OUTPATIENT
Start: 2022-07-22 | End: 2022-07-25 | Stop reason: HOSPADM

## 2022-07-22 RX ORDER — ENOXAPARIN SODIUM 100 MG/ML
40 INJECTION SUBCUTANEOUS DAILY
Status: DISCONTINUED | OUTPATIENT
Start: 2022-07-22 | End: 2022-07-25 | Stop reason: HOSPADM

## 2022-07-22 RX ORDER — SODIUM CHLORIDE FOR INHALATION 0.9 %
3 VIAL, NEBULIZER (ML) INHALATION
Status: DISCONTINUED | OUTPATIENT
Start: 2022-07-22 | End: 2022-07-25 | Stop reason: HOSPADM

## 2022-07-22 RX ORDER — FLUDROCORTISONE ACETATE 0.1 MG/1
0.1 TABLET ORAL DAILY
Status: DISCONTINUED | OUTPATIENT
Start: 2022-07-22 | End: 2022-07-25 | Stop reason: HOSPADM

## 2022-07-22 RX ORDER — IPRATROPIUM BROMIDE AND ALBUTEROL SULFATE 2.5; .5 MG/3ML; MG/3ML
1 SOLUTION RESPIRATORY (INHALATION)
Status: DISCONTINUED | OUTPATIENT
Start: 2022-07-22 | End: 2022-07-22

## 2022-07-22 RX ORDER — INSULIN LISPRO 100 [IU]/ML
0-4 INJECTION, SOLUTION INTRAVENOUS; SUBCUTANEOUS NIGHTLY
Status: DISCONTINUED | OUTPATIENT
Start: 2022-07-22 | End: 2022-07-25 | Stop reason: HOSPADM

## 2022-07-22 RX ORDER — IPRATROPIUM BROMIDE AND ALBUTEROL SULFATE 2.5; .5 MG/3ML; MG/3ML
1 SOLUTION RESPIRATORY (INHALATION)
Status: DISCONTINUED | OUTPATIENT
Start: 2022-07-22 | End: 2022-07-25 | Stop reason: HOSPADM

## 2022-07-22 RX ORDER — METHYLPREDNISOLONE SODIUM SUCCINATE 125 MG/2ML
125 INJECTION, POWDER, LYOPHILIZED, FOR SOLUTION INTRAMUSCULAR; INTRAVENOUS ONCE
Status: COMPLETED | OUTPATIENT
Start: 2022-07-22 | End: 2022-07-22

## 2022-07-22 RX ORDER — CITALOPRAM 20 MG/1
40 TABLET ORAL DAILY
Status: DISCONTINUED | OUTPATIENT
Start: 2022-07-22 | End: 2022-07-25 | Stop reason: HOSPADM

## 2022-07-22 RX ORDER — BUSPIRONE HYDROCHLORIDE 5 MG/1
5 TABLET ORAL 2 TIMES DAILY
Status: DISCONTINUED | OUTPATIENT
Start: 2022-07-22 | End: 2022-07-25 | Stop reason: HOSPADM

## 2022-07-22 RX ORDER — METHYLPREDNISOLONE SODIUM SUCCINATE 125 MG/2ML
125 INJECTION, POWDER, LYOPHILIZED, FOR SOLUTION INTRAMUSCULAR; INTRAVENOUS EVERY 6 HOURS
Status: DISCONTINUED | OUTPATIENT
Start: 2022-07-22 | End: 2022-07-23

## 2022-07-22 RX ORDER — SODIUM CHLORIDE 0.9 % (FLUSH) 0.9 %
10 SYRINGE (ML) INJECTION EVERY 12 HOURS SCHEDULED
Status: DISCONTINUED | OUTPATIENT
Start: 2022-07-22 | End: 2022-07-25 | Stop reason: HOSPADM

## 2022-07-22 RX ORDER — ATORVASTATIN CALCIUM 10 MG/1
10 TABLET, FILM COATED ORAL NIGHTLY
Refills: 3 | Status: DISCONTINUED | OUTPATIENT
Start: 2022-07-22 | End: 2022-07-25 | Stop reason: HOSPADM

## 2022-07-22 RX ORDER — SODIUM CHLORIDE 9 MG/ML
INJECTION, SOLUTION INTRAVENOUS PRN
Status: DISCONTINUED | OUTPATIENT
Start: 2022-07-22 | End: 2022-07-25 | Stop reason: HOSPADM

## 2022-07-22 RX ORDER — BUTALBITAL, ACETAMINOPHEN AND CAFFEINE 50; 325; 40 MG/1; MG/1; MG/1
2 TABLET ORAL EVERY 4 HOURS PRN
Status: DISCONTINUED | OUTPATIENT
Start: 2022-07-22 | End: 2022-07-25 | Stop reason: HOSPADM

## 2022-07-22 RX ORDER — TRAZODONE HYDROCHLORIDE 50 MG/1
50 TABLET ORAL NIGHTLY PRN
Status: DISCONTINUED | OUTPATIENT
Start: 2022-07-22 | End: 2022-07-25 | Stop reason: HOSPADM

## 2022-07-22 RX ORDER — ONDANSETRON 2 MG/ML
4 INJECTION INTRAMUSCULAR; INTRAVENOUS EVERY 6 HOURS PRN
Status: DISCONTINUED | OUTPATIENT
Start: 2022-07-22 | End: 2022-07-25 | Stop reason: HOSPADM

## 2022-07-22 RX ADMIN — INSULIN LISPRO 1 UNITS: 100 INJECTION, SOLUTION INTRAVENOUS; SUBCUTANEOUS at 17:40

## 2022-07-22 RX ADMIN — FLUDROCORTISONE ACETATE 0.1 MG: 0.1 TABLET ORAL at 20:48

## 2022-07-22 RX ADMIN — ONDANSETRON 4 MG: 2 INJECTION INTRAMUSCULAR; INTRAVENOUS at 21:37

## 2022-07-22 RX ADMIN — BUTALBITAL, ACETAMINOPHEN, AND CAFFEINE 2 TABLET: 50; 325; 40 TABLET ORAL at 16:09

## 2022-07-22 RX ADMIN — MIDODRINE HYDROCHLORIDE 5 MG: 5 TABLET ORAL at 17:40

## 2022-07-22 RX ADMIN — PANTOPRAZOLE SODIUM 40 MG: 40 TABLET, DELAYED RELEASE ORAL at 20:47

## 2022-07-22 RX ADMIN — OXYBUTYNIN CHLORIDE 10 MG: 5 TABLET, EXTENDED RELEASE ORAL at 20:47

## 2022-07-22 RX ADMIN — ALBUTEROL SULFATE 2.5 MG: 2.5 SOLUTION RESPIRATORY (INHALATION) at 17:26

## 2022-07-22 RX ADMIN — ATORVASTATIN CALCIUM 10 MG: 10 TABLET, FILM COATED ORAL at 20:47

## 2022-07-22 RX ADMIN — IPRATROPIUM BROMIDE AND ALBUTEROL SULFATE 1 AMPULE: 2.5; .5 SOLUTION RESPIRATORY (INHALATION) at 20:51

## 2022-07-22 RX ADMIN — MONTELUKAST 10 MG: 10 TABLET, FILM COATED ORAL at 17:40

## 2022-07-22 RX ADMIN — MOMETASONE FUROATE AND FORMOTEROL FUMARATE DIHYDRATE 2 PUFF: 200; 5 AEROSOL RESPIRATORY (INHALATION) at 20:51

## 2022-07-22 RX ADMIN — ACETAMINOPHEN 1000 MG: 500 TABLET, FILM COATED ORAL at 12:03

## 2022-07-22 RX ADMIN — CEFTRIAXONE 1000 MG: 1 INJECTION, POWDER, FOR SOLUTION INTRAMUSCULAR; INTRAVENOUS at 14:09

## 2022-07-22 RX ADMIN — ENOXAPARIN SODIUM 40 MG: 100 INJECTION SUBCUTANEOUS at 16:13

## 2022-07-22 RX ADMIN — METHYLPREDNISOLONE SODIUM SUCCINATE 125 MG: 125 INJECTION, POWDER, FOR SOLUTION INTRAMUSCULAR; INTRAVENOUS at 17:40

## 2022-07-22 RX ADMIN — TRAZODONE HYDROCHLORIDE 50 MG: 50 TABLET ORAL at 20:47

## 2022-07-22 RX ADMIN — METOPROLOL TARTRATE 50 MG: 50 TABLET ORAL at 20:47

## 2022-07-22 RX ADMIN — METHYLPREDNISOLONE SODIUM SUCCINATE 125 MG: 125 INJECTION, POWDER, FOR SOLUTION INTRAMUSCULAR; INTRAVENOUS at 12:03

## 2022-07-22 RX ADMIN — IPRATROPIUM BROMIDE AND ALBUTEROL SULFATE 1 AMPULE: .5; 3 SOLUTION RESPIRATORY (INHALATION) at 11:15

## 2022-07-22 RX ADMIN — CITALOPRAM HYDROBROMIDE 40 MG: 20 TABLET ORAL at 20:47

## 2022-07-22 RX ADMIN — IPRATROPIUM BROMIDE AND ALBUTEROL SULFATE 1 AMPULE: 2.5; .5 SOLUTION RESPIRATORY (INHALATION) at 15:28

## 2022-07-22 RX ADMIN — SODIUM CHLORIDE, PRESERVATIVE FREE 10 ML: 5 INJECTION INTRAVENOUS at 21:37

## 2022-07-22 RX ADMIN — BUSPIRONE HYDROCHLORIDE 5 MG: 5 TABLET ORAL at 20:48

## 2022-07-22 ASSESSMENT — ENCOUNTER SYMPTOMS
SHORTNESS OF BREATH: 1
EYE PAIN: 0
COUGH: 1
ABDOMINAL PAIN: 0
WHEEZING: 1
NAUSEA: 0
DIARRHEA: 0
BACK PAIN: 0
VOMITING: 0
BLOOD IN STOOL: 0
CONSTIPATION: 0

## 2022-07-22 ASSESSMENT — PAIN SCALES - GENERAL
PAINLEVEL_OUTOF10: 0
PAINLEVEL_OUTOF10: 7
PAINLEVEL_OUTOF10: 5

## 2022-07-22 ASSESSMENT — PAIN DESCRIPTION - DESCRIPTORS
DESCRIPTORS: THROBBING
DESCRIPTORS: ACHING;THROBBING

## 2022-07-22 ASSESSMENT — PAIN - FUNCTIONAL ASSESSMENT
PAIN_FUNCTIONAL_ASSESSMENT: ACTIVITIES ARE NOT PREVENTED
PAIN_FUNCTIONAL_ASSESSMENT: 0-10

## 2022-07-22 ASSESSMENT — PAIN DESCRIPTION - FREQUENCY: FREQUENCY: CONTINUOUS

## 2022-07-22 ASSESSMENT — LIFESTYLE VARIABLES
HOW MANY STANDARD DRINKS CONTAINING ALCOHOL DO YOU HAVE ON A TYPICAL DAY: PATIENT DOES NOT DRINK
HOW OFTEN DO YOU HAVE A DRINK CONTAINING ALCOHOL: NEVER

## 2022-07-22 ASSESSMENT — PAIN DESCRIPTION - PAIN TYPE: TYPE: ACUTE PAIN

## 2022-07-22 ASSESSMENT — PAIN DESCRIPTION - LOCATION
LOCATION: HEAD

## 2022-07-22 ASSESSMENT — PAIN DESCRIPTION - ORIENTATION: ORIENTATION: ANTERIOR

## 2022-07-22 ASSESSMENT — PAIN DESCRIPTION - ONSET: ONSET: GRADUAL

## 2022-07-22 NOTE — TELEPHONE ENCOUNTER
ECC transferred a patient to Nurse Triage patient was complaining of shortness of breath. Patient stated that she went to a UC close to her home and they gave her some medication and it is not helping. Patient is wheezing and is complaining of shortness of breath. Patient was advised that she should be seen in UC. This writer asked if she had a pulse ox machine and patient did not.  Patient states that she does have someone to bring her to [facility] and she will come

## 2022-07-22 NOTE — ED NOTES
Just informed patient took own oral Zithromax.  [facility] RN informed     Chino Steven, QUE  07/22/22 0405

## 2022-07-22 NOTE — H&P
HOSPITALIST ADMISSION H&P      REASON FOR ADMISSION:      COPD exacerbation---superposed on chronic bronchitis  ESTIMATED LENGTH OF STAY:   > 2 midnights---2-3 days    ATTENDING/ADMITTING PHYSICIAN: Iris Lyn MD  PCP: PIERO Valadez    HISTORY OF PRESENT ILLNESS:      The patient is a 38 y.o. female patient of PIERO Valadez who presents with increasing shortness of breath over the last week---wheezing---tight--non productive cough---chills, but no fever or nausea-vomiting----pneumonia May 2022---Covid--[-]---leukocytosis---poor sleep----CXR---[-]    Neurocardiogenic syncope--history    Migraine headaches---to receive Fioricet    Tobacco abuse---1 PPD       See below for additional PMH.     Patient lfgx-[id number]-tgqcbrci-available records reviewed, including, but not limited to, ER reports-labs--imaging---office records--personal notes       Past Medical History:   Diagnosis Date    Atrophic vaginitis 03/10/2016    Chronic fatigue     Constipation     Endometriosis     h/o endometriosis for which she had a hysterectomy    Genital herpes     Hyperlipidemia     Migraine     migraine cephalgia    Neurocardiogenic syncope     Ovarian cyst     Pelvic inflammatory disease (PID)     Pneumonia due to infectious organism     Sinusitis, chronic     chronic sinusitis probably related to tobacco abuse    Tobacco abuse     Vitamin D deficiency            Past Surgical History:   Procedure Laterality Date    APPENDECTOMY      LAPAROSCOPY      X3 before the hysterectomy    OTHER SURGICAL HISTORY  2016    lump removed lower right leg    KRISTIN AND BSO (CERVIX REMOVED)  2006    total with bilateral salpingo-oophorectomy    WISDOM TOOTH EXTRACTION         Medications Prior to Admission:    Medications Prior to Admission: montelukast (SINGULAIR) 10 MG tablet, TAKE ONE TABLET BY MOUTH EVERY EVENING  flunisolide (NASALIDE) 25 MCG/ACT (0.025%) SOLN, 2 sprays into both nostrils once a day  levocetirizine (XYZAL) 5 MG tablet, Take 1 tablet by mouth daily (Patient not taking: No sig reported)  nicotine (NICODERM CQ) 21 MG/24HR, Place 1 patch onto the skin every 24 hours  nicotine (NICODERM CQ) 14 MG/24HR, Place 1 patch onto the skin every 24 hours for 15 days  nicotine (NICODERM CQ) 7 MG/24HR, Place 1 patch onto the skin every 24 hours for 15 days  budesonide-formoterol (SYMBICORT) 160-4.5 MCG/ACT AERO, Inhale 1 puff into the lungs 2 times daily  pantoprazole (PROTONIX) 40 MG tablet, TAKE ONE TABLET BY MOUTH TWICE A DAY  metoprolol tartrate (LOPRESSOR) 25 MG tablet, TAKE ONE TABLET BY MOUTH TWICE A DAY (Patient taking differently: 50 mg  in the morning and 50 mg before bedtime. TAKE ONE TABLET BY MOUTH TWICE A DAY. )  simvastatin (ZOCOR) 20 MG tablet, TAKE ONE TABLET BY MOUTH ONCE NIGHTLY  oxybutynin (DITROPAN XL) 10 MG extended release tablet, Take 1 tablet by mouth daily  busPIRone (BUSPAR) 5 MG tablet, TAKE ONE TABLET BY MOUTH TWICE A DAY  Spacer/Aero-Holding Chambers MARIA DE JESUS, 1 Device by Does not apply route as needed (To be used with albuterol inhaler)  citalopram (CELEXA) 40 MG tablet, Take 1 tablet by mouth daily  fludrocortisone (FLORINEF) 0.1 MG tablet, Take 1 tablet by mouth daily  midodrine (PROAMATINE) 5 MG tablet, Take 1 tablet by mouth 3 times daily  traZODone (DESYREL) 50 MG tablet, Take 1 tablet by mouth nightly as needed for Sleep  albuterol sulfate HFA (PROVENTIL HFA) 108 (90 Base) MCG/ACT inhaler, Inhale 2 puffs into the lungs every 4 hours as needed for Wheezing  diphenhydrAMINE (BENADRYL) 25 MG tablet, Take 25 mg by mouth nightly as needed for Itching  vitamin D (CHOLECALCIFEROL) 1000 UNIT TABS tablet, Take 2 tablets by mouth daily. gummies  Multiple Vitamins-Minerals (MULTIVITAMIN PO), Take 1 tablet by mouth daily. BLACK COHOSH ROOT, Take 600 mg by mouth 2 times daily as needed. FOR HOT FLASHES    Allergies:    Amoxicillin, Other, and Pcn [penicillins]    Social History:    reports that she has been smoking cigarettes. She started smoking about 24 years ago. She has a 15.00 pack-year smoking history. She has never used smokeless tobacco. She reports current alcohol use. She reports that she does not use drugs. --cocaine 20 years ago----quit 20 years ago--no recurrence      Family History:   family history includes Cancer (age of onset: 76) in her paternal grandfather; Depression in her mother; Diabetes in her maternal grandfather, maternal grandmother, and maternal uncle; Heart Disease in her mother; Hypertension in her mother; Migraines in her mother; Other in her father and mother; Parkinsonism in her mother; Stroke (age of onset: 62) in her mother. REVIEW OF SYSTEMS:  See HPI and problem list; otherwise no other new complaints with respect to HEENT, neck, pulmonary, coronary, GI, , endocrine, musculoskeletal, immune system/connective tissue disease, hematologic, neuropsych, skin, lymphatics, or malignancies. PHYSICAL EXAM:  Vitals:  BP (!) 96/57   Pulse (!) 102   Temp 97.7 °F (36.5 °C) (Tympanic)   Resp 17   Ht 5' 1.5\" (1.562 m)   Wt 134 lb 11.2 oz (61.1 kg)   LMP 08/01/2006   SpO2 94%   BMI 25.04 kg/m²     HEENT:  O2 NC, Normocephalic, and Atraumatic  Neck: Supple, No Masses, Tenderness, Nodularity, and No Lymphadenopathy  Chest/Lungs:  \"tight\"----, Inspiratory Wheezes Present, Expiratory Wheezes, Prolonged Expiratory Phase, and Poor Air Movement  Cardiac: Regular Rate and Rhythm  GI/Abdomen:  Bowel Sounds Present and Soft, Non-tender, without Guarding or Rebound Tenderness  : Not examined  EXT/Skin:  multiple tattos none infected appearing BLE multiple bruises [has dogs--puppies], No Cyanosis, No Clubbing, and Edema Present  Neuro: Alert and Oriented, to Person, to Time, to Place, to Situation, and No Localizing Signs/Symptoms        LABS:    CBC with Differential:    Lab Results   Component Value Date/Time    WBC 21.8 07/22/2022 10:46 AM    RBC 3.91 07/22/2022 10:46 AM    HGB 11.8 07/22/2022 10:46 AM    HCT 37.2 07/22/2022 10:46 AM     07/22/2022 10:46 AM    MCV 95.1 07/22/2022 10:46 AM    MCH 30.2 07/22/2022 10:46 AM    MCHC 31.7 07/22/2022 10:46 AM    RDW 13.8 07/22/2022 10:46 AM    LYMPHOPCT 9 07/22/2022 10:46 AM    MONOPCT 6 07/22/2022 10:46 AM    BASOPCT 0 07/22/2022 10:46 AM    MONOSABS 1.35 07/22/2022 10:46 AM    LYMPHSABS 2.04 07/22/2022 10:46 AM    EOSABS 0.10 07/22/2022 10:46 AM    BASOSABS 0.05 07/22/2022 10:46 AM    DIFFTYPE NOT REPORTED 11/13/2018 12:04 PM     BMP:    Lab Results   Component Value Date/Time     07/22/2022 10:46 AM    K 4.2 07/22/2022 10:46 AM     07/22/2022 10:46 AM    CO2 27 07/22/2022 10:46 AM    BUN 17 07/22/2022 10:46 AM    LABALBU 4.4 07/22/2022 10:46 AM    CREATININE 0.84 07/22/2022 10:46 AM    CALCIUM 9.6 07/22/2022 10:46 AM    GFRAA >60 07/22/2022 10:46 AM    LABGLOM >60 07/22/2022 10:46 AM    GLUCOSE 94 07/22/2022 10:46 AM       ASSESSMENT:      Patient Active Problem List   Diagnosis    Vitamin D deficiency    Sinusitis, chronic    Migraine    Constipation    Genital herpes    Neurocardiogenic syncope    Chronic fatigue    Tobacco abuse    Atrophic vaginitis    Mixed hyperlipidemia    Lower urinary tract symptoms    COPD exacerbation (HCC)     MARIA DAS  38  WF  JONAS Wier--FP]  FULL CODE   LOVENOX    SUPPLEMENTAL OXYGEN---hospital only  COVID-19--NEGATIVE    Anti-infectives:   Rocephin IV, Zithromax IV        [Solumedrol IV]    COPD exacerbation--7.22.2022--superposed on chronic bronchitis  Leukocytosis--7.22.2022          CXR---7.22.2022---[-]          EKG----7.22.2022---sinus tachycardia-106--LAE  HTN   Neurocardiogenic syncope--history   Hyperlipidemia   Prior substance abuse---crack cocaine  Tobacco abuse--1 PPD  PMH:    atrophic vaginitis, chronic fatigue, constipation, endometriosis, genital herpes,               ovarian cyst, PID--pelvic inflammatory disease, pneumonia---2022, chronic               sinusitis, Vitamin D deficiency  PSH:    [id number], appendectomy, wisdom tooth extraction, laparoscopy    Allergies:  PCN--amoxicillin      Code Status--FULL CODE  OARRS--7.22.2022---[-]    PLAN:      COPD--bronchitis---Rocephin--Zithromax---Solumedrol--med nebs---supplemental oxygen    Goal O2 sat 90-95%    Tobacco cessation---nicotine patch offered     Home medications reviewed     Insomnia----Ambien 5 mg PO qHS PRN     See orders     Note that over 50 minutes was spent in evaluation of the patient, review of the chart and pertinent records, discussion with family/staff, etc    Bismark Kay MD  6:49 PM  7/22/2022

## 2022-07-23 ENCOUNTER — APPOINTMENT (OUTPATIENT)
Dept: GENERAL RADIOLOGY | Age: 42
DRG: 192 | End: 2022-07-23
Payer: COMMERCIAL

## 2022-07-23 LAB
ABSOLUTE EOS #: <0.03 K/UL (ref 0–0.44)
ABSOLUTE IMMATURE GRANULOCYTE: 0.06 K/UL (ref 0–0.3)
ABSOLUTE LYMPH #: 0.89 K/UL (ref 1.1–3.7)
ABSOLUTE MONO #: 0.18 K/UL (ref 0.1–1.2)
ANION GAP SERPL CALCULATED.3IONS-SCNC: 10 MMOL/L (ref 9–17)
BASOPHILS # BLD: 0 % (ref 0–2)
BASOPHILS ABSOLUTE: <0.03 K/UL (ref 0–0.2)
BUN BLDV-MCNC: 17 MG/DL (ref 6–20)
BUN/CREAT BLD: 22 (ref 9–20)
CALCIUM SERPL-MCNC: 9.4 MG/DL (ref 8.6–10.4)
CHLORIDE BLD-SCNC: 102 MMOL/L (ref 98–107)
CO2: 29 MMOL/L (ref 20–31)
CREAT SERPL-MCNC: 0.76 MG/DL (ref 0.5–0.9)
EOSINOPHILS RELATIVE PERCENT: 0 % (ref 1–4)
GFR AFRICAN AMERICAN: >60 ML/MIN
GFR NON-AFRICAN AMERICAN: >60 ML/MIN
GFR SERPL CREATININE-BSD FRML MDRD: ABNORMAL ML/MIN/{1.73_M2}
GLUCOSE BLD-MCNC: 141 MG/DL (ref 65–105)
GLUCOSE BLD-MCNC: 154 MG/DL (ref 65–105)
GLUCOSE BLD-MCNC: 162 MG/DL (ref 70–99)
GLUCOSE BLD-MCNC: 272 MG/DL (ref 65–105)
HCT VFR BLD CALC: 34.9 % (ref 36.3–47.1)
HEMOGLOBIN: 11 G/DL (ref 11.9–15.1)
IMMATURE GRANULOCYTES: 1 %
LYMPHOCYTES # BLD: 7 % (ref 24–43)
MCH RBC QN AUTO: 30.1 PG (ref 25.2–33.5)
MCHC RBC AUTO-ENTMCNC: 31.5 G/DL (ref 25.2–33.5)
MCV RBC AUTO: 95.4 FL (ref 82.6–102.9)
MONOCYTES # BLD: 1 % (ref 3–12)
NRBC AUTOMATED: 0 PER 100 WBC
PDW BLD-RTO: 13.3 % (ref 11.8–14.4)
PLATELET # BLD: 242 K/UL (ref 138–453)
PMV BLD AUTO: 11.5 FL (ref 8.1–13.5)
POTASSIUM SERPL-SCNC: 4.2 MMOL/L (ref 3.7–5.3)
RBC # BLD: 3.66 M/UL (ref 3.95–5.11)
SEG NEUTROPHILS: 91 % (ref 36–65)
SEGMENTED NEUTROPHILS ABSOLUTE COUNT: 12.08 K/UL (ref 1.5–8.1)
SODIUM BLD-SCNC: 141 MMOL/L (ref 135–144)
WBC # BLD: 13.2 K/UL (ref 3.5–11.3)

## 2022-07-23 PROCEDURE — 6360000002 HC RX W HCPCS: Performed by: INTERNAL MEDICINE

## 2022-07-23 PROCEDURE — 85025 COMPLETE CBC W/AUTO DIFF WBC: CPT

## 2022-07-23 PROCEDURE — 2580000003 HC RX 258: Performed by: INTERNAL MEDICINE

## 2022-07-23 PROCEDURE — 94640 AIRWAY INHALATION TREATMENT: CPT

## 2022-07-23 PROCEDURE — 6370000000 HC RX 637 (ALT 250 FOR IP): Performed by: INTERNAL MEDICINE

## 2022-07-23 PROCEDURE — 2700000000 HC OXYGEN THERAPY PER DAY

## 2022-07-23 PROCEDURE — 99232 SBSQ HOSP IP/OBS MODERATE 35: CPT | Performed by: INTERNAL MEDICINE

## 2022-07-23 PROCEDURE — 80048 BASIC METABOLIC PNL TOTAL CA: CPT

## 2022-07-23 PROCEDURE — 36415 COLL VENOUS BLD VENIPUNCTURE: CPT

## 2022-07-23 PROCEDURE — 94761 N-INVAS EAR/PLS OXIMETRY MLT: CPT

## 2022-07-23 PROCEDURE — 82947 ASSAY GLUCOSE BLOOD QUANT: CPT

## 2022-07-23 PROCEDURE — 71045 X-RAY EXAM CHEST 1 VIEW: CPT

## 2022-07-23 PROCEDURE — 1200000000 HC SEMI PRIVATE

## 2022-07-23 RX ORDER — BENZONATATE 100 MG/1
100 CAPSULE ORAL 3 TIMES DAILY PRN
Status: DISCONTINUED | OUTPATIENT
Start: 2022-07-23 | End: 2022-07-25 | Stop reason: HOSPADM

## 2022-07-23 RX ORDER — METHYLPREDNISOLONE SODIUM SUCCINATE 125 MG/2ML
125 INJECTION, POWDER, LYOPHILIZED, FOR SOLUTION INTRAMUSCULAR; INTRAVENOUS EVERY 8 HOURS SCHEDULED
Status: DISCONTINUED | OUTPATIENT
Start: 2022-07-23 | End: 2022-07-24 | Stop reason: DRUGHIGH

## 2022-07-23 RX ADMIN — METOPROLOL TARTRATE 50 MG: 50 TABLET ORAL at 21:20

## 2022-07-23 RX ADMIN — METHYLPREDNISOLONE SODIUM SUCCINATE 125 MG: 125 INJECTION, POWDER, FOR SOLUTION INTRAMUSCULAR; INTRAVENOUS at 21:20

## 2022-07-23 RX ADMIN — PANTOPRAZOLE SODIUM 40 MG: 40 TABLET, DELAYED RELEASE ORAL at 21:20

## 2022-07-23 RX ADMIN — ENOXAPARIN SODIUM 40 MG: 100 INJECTION SUBCUTANEOUS at 09:43

## 2022-07-23 RX ADMIN — CITALOPRAM HYDROBROMIDE 40 MG: 20 TABLET ORAL at 09:44

## 2022-07-23 RX ADMIN — IPRATROPIUM BROMIDE AND ALBUTEROL SULFATE 1 AMPULE: 2.5; .5 SOLUTION RESPIRATORY (INHALATION) at 15:39

## 2022-07-23 RX ADMIN — CEFTRIAXONE 1000 MG: 1 INJECTION, POWDER, FOR SOLUTION INTRAMUSCULAR; INTRAVENOUS at 09:52

## 2022-07-23 RX ADMIN — SODIUM CHLORIDE, PRESERVATIVE FREE 10 ML: 5 INJECTION INTRAVENOUS at 10:33

## 2022-07-23 RX ADMIN — MOMETASONE FUROATE AND FORMOTEROL FUMARATE DIHYDRATE 2 PUFF: 200; 5 AEROSOL RESPIRATORY (INHALATION) at 21:24

## 2022-07-23 RX ADMIN — METOPROLOL TARTRATE 50 MG: 50 TABLET ORAL at 09:44

## 2022-07-23 RX ADMIN — IPRATROPIUM BROMIDE AND ALBUTEROL SULFATE 1 AMPULE: 2.5; .5 SOLUTION RESPIRATORY (INHALATION) at 11:29

## 2022-07-23 RX ADMIN — MONTELUKAST 10 MG: 10 TABLET, FILM COATED ORAL at 17:59

## 2022-07-23 RX ADMIN — IPRATROPIUM BROMIDE AND ALBUTEROL SULFATE 1 AMPULE: 2.5; .5 SOLUTION RESPIRATORY (INHALATION) at 00:23

## 2022-07-23 RX ADMIN — IPRATROPIUM BROMIDE AND ALBUTEROL SULFATE 1 AMPULE: 2.5; .5 SOLUTION RESPIRATORY (INHALATION) at 07:47

## 2022-07-23 RX ADMIN — MIDODRINE HYDROCHLORIDE 5 MG: 5 TABLET ORAL at 11:55

## 2022-07-23 RX ADMIN — BUSPIRONE HYDROCHLORIDE 5 MG: 5 TABLET ORAL at 21:20

## 2022-07-23 RX ADMIN — IPRATROPIUM BROMIDE AND ALBUTEROL SULFATE 1 AMPULE: 2.5; .5 SOLUTION RESPIRATORY (INHALATION) at 21:24

## 2022-07-23 RX ADMIN — OXYBUTYNIN CHLORIDE 10 MG: 5 TABLET, EXTENDED RELEASE ORAL at 09:44

## 2022-07-23 RX ADMIN — IPRATROPIUM BROMIDE AND ALBUTEROL SULFATE 1 AMPULE: 2.5; .5 SOLUTION RESPIRATORY (INHALATION) at 04:40

## 2022-07-23 RX ADMIN — FLUDROCORTISONE ACETATE 0.1 MG: 0.1 TABLET ORAL at 09:44

## 2022-07-23 RX ADMIN — ATORVASTATIN CALCIUM 10 MG: 10 TABLET, FILM COATED ORAL at 21:20

## 2022-07-23 RX ADMIN — INSULIN LISPRO 2 UNITS: 100 INJECTION, SOLUTION INTRAVENOUS; SUBCUTANEOUS at 11:55

## 2022-07-23 RX ADMIN — BUSPIRONE HYDROCHLORIDE 5 MG: 5 TABLET ORAL at 09:44

## 2022-07-23 RX ADMIN — MIDODRINE HYDROCHLORIDE 5 MG: 5 TABLET ORAL at 17:59

## 2022-07-23 RX ADMIN — MOMETASONE FUROATE AND FORMOTEROL FUMARATE DIHYDRATE 2 PUFF: 200; 5 AEROSOL RESPIRATORY (INHALATION) at 07:49

## 2022-07-23 RX ADMIN — METHYLPREDNISOLONE SODIUM SUCCINATE 125 MG: 125 INJECTION, POWDER, FOR SOLUTION INTRAMUSCULAR; INTRAVENOUS at 06:31

## 2022-07-23 RX ADMIN — AZITHROMYCIN MONOHYDRATE 500 MG: 500 INJECTION, POWDER, LYOPHILIZED, FOR SOLUTION INTRAVENOUS at 10:38

## 2022-07-23 RX ADMIN — BENZONATATE 100 MG: 100 CAPSULE ORAL at 17:59

## 2022-07-23 RX ADMIN — MIDODRINE HYDROCHLORIDE 5 MG: 5 TABLET ORAL at 09:56

## 2022-07-23 RX ADMIN — METHYLPREDNISOLONE SODIUM SUCCINATE 125 MG: 125 INJECTION, POWDER, FOR SOLUTION INTRAMUSCULAR; INTRAVENOUS at 00:51

## 2022-07-23 RX ADMIN — METHYLPREDNISOLONE SODIUM SUCCINATE 125 MG: 125 INJECTION, POWDER, FOR SOLUTION INTRAMUSCULAR; INTRAVENOUS at 14:08

## 2022-07-23 RX ADMIN — PANTOPRAZOLE SODIUM 40 MG: 40 TABLET, DELAYED RELEASE ORAL at 09:44

## 2022-07-23 RX ADMIN — SODIUM CHLORIDE, PRESERVATIVE FREE 10 ML: 5 INJECTION INTRAVENOUS at 21:21

## 2022-07-23 ASSESSMENT — PAIN - FUNCTIONAL ASSESSMENT: PAIN_FUNCTIONAL_ASSESSMENT: ACTIVITIES ARE NOT PREVENTED

## 2022-07-23 ASSESSMENT — PAIN DESCRIPTION - LOCATION: LOCATION: HEAD

## 2022-07-23 ASSESSMENT — PAIN DESCRIPTION - ORIENTATION: ORIENTATION: ANTERIOR

## 2022-07-23 ASSESSMENT — PAIN DESCRIPTION - ONSET: ONSET: GRADUAL

## 2022-07-23 ASSESSMENT — PAIN SCALES - GENERAL: PAINLEVEL_OUTOF10: 0

## 2022-07-23 ASSESSMENT — PAIN DESCRIPTION - PAIN TYPE: TYPE: ACUTE PAIN

## 2022-07-23 ASSESSMENT — PAIN DESCRIPTION - FREQUENCY: FREQUENCY: CONTINUOUS

## 2022-07-23 ASSESSMENT — PAIN DESCRIPTION - DESCRIPTORS: DESCRIPTORS: ACHING;THROBBING

## 2022-07-23 NOTE — PROGRESS NOTES
Hospitalist Progress Note  7/23/2022 10:10 AM  Subjective:   Admit Date: 7/22/2022  PCP: PIERO Adamson  Interval History: Feeling a little better. No new concerns    Diet: ADULT DIET; Regular; 4 carb choices (60 gm/meal)  Medications:   Scheduled Meds:   azithromycin  500 mg IntraVENous Once    sodium chloride flush  10 mL IntraVENous 2 times per day    enoxaparin  40 mg SubCUTAneous Daily    ipratropium-albuterol  1 ampule Inhalation 6 times per day    mometasone-formoterol  2 puff Inhalation BID    busPIRone  5 mg Oral BID    citalopram  40 mg Oral Daily    fludrocortisone  0.1 mg Oral Daily    metoprolol tartrate  50 mg Oral BID    midodrine  5 mg Oral TID WC    montelukast  10 mg Oral QPM    nicotine  1 patch TransDERmal Q24H    oxybutynin  10 mg Oral Daily    pantoprazole  40 mg Oral BID    atorvastatin  10 mg Oral Nightly    azithromycin  500 mg IntraVENous Q24H    cefTRIAXone (ROCEPHIN) IV  1,000 mg IntraVENous Q24H    methylPREDNISolone  125 mg IntraVENous Q6H    insulin lispro  0-4 Units SubCUTAneous TID WC    insulin lispro  0-4 Units SubCUTAneous Nightly     Continuous Infusions:   sodium chloride      dextrose       CBC:   Recent Labs     07/22/22  1046 07/23/22  0536   WBC 21.8* 13.2*   HGB 11.8* 11.0*    242     BMP:    Recent Labs     07/22/22  1046 07/23/22  0536    141   K 4.2 4.2    102   CO2 27 29   BUN 17 17   CREATININE 0.84 0.76   GLUCOSE 94 162*     Hepatic:   Recent Labs     07/22/22  1046   AST 21   ALT 13   BILITOT 0.25*   ALKPHOS 86     Troponin: No results for input(s): TROPONINI in the last 72 hours. BNP: No results for input(s): BNP in the last 72 hours. Lipids: No results for input(s): CHOL, HDL in the last 72 hours. Invalid input(s): LDLCALCU  INR: No results for input(s): INR in the last 72 hours.     Objective:   Vitals: /60   Pulse 86   Temp 97.6 °F (36.4 °C) (Tympanic)   Resp 17   Ht 5' 1.5\" (1.562 m)   Wt 134 lb 11.2 oz (61.1 kg)   LMP

## 2022-07-23 NOTE — PLAN OF CARE
Problem: Discharge Planning  Goal: Discharge to home or other facility with appropriate resources  Outcome: Progressing  Flowsheets  Taken 7/22/2022 1457 by Jeremy Flores RN  Discharge to home or other facility with appropriate resources:   Identify barriers to discharge with patient and caregiver   Arrange for needed discharge resources and transportation as appropriate   Identify discharge learning needs (meds, wound care, etc)   Refer to discharge planning if patient needs post-hospital services based on physician order or complex needs related to functional status, cognitive ability or social support system  Taken 7/22/2022 1445 by Tam Jonas RN  Discharge to home or other facility with appropriate resources: Identify barriers to discharge with patient and caregiver     Problem: Pain  Goal: Verbalizes/displays adequate comfort level or baseline comfort level  Outcome: Progressing

## 2022-07-24 LAB
ABSOLUTE EOS #: <0.03 K/UL (ref 0–0.44)
ABSOLUTE IMMATURE GRANULOCYTE: 0.11 K/UL (ref 0–0.3)
ABSOLUTE LYMPH #: 1.29 K/UL (ref 1.1–3.7)
ABSOLUTE MONO #: 0.66 K/UL (ref 0.1–1.2)
ANION GAP SERPL CALCULATED.3IONS-SCNC: 11 MMOL/L (ref 9–17)
BASOPHILS # BLD: 0 % (ref 0–2)
BASOPHILS ABSOLUTE: <0.03 K/UL (ref 0–0.2)
BUN BLDV-MCNC: 23 MG/DL (ref 6–20)
BUN/CREAT BLD: 32 (ref 9–20)
CALCIUM SERPL-MCNC: 9.3 MG/DL (ref 8.6–10.4)
CHLORIDE BLD-SCNC: 101 MMOL/L (ref 98–107)
CO2: 29 MMOL/L (ref 20–31)
CREAT SERPL-MCNC: 0.73 MG/DL (ref 0.5–0.9)
EKG ATRIAL RATE: 106 BPM
EKG P AXIS: 72 DEGREES
EKG P-R INTERVAL: 136 MS
EKG Q-T INTERVAL: 338 MS
EKG QRS DURATION: 74 MS
EKG QTC CALCULATION (BAZETT): 448 MS
EKG R AXIS: 59 DEGREES
EKG T AXIS: 62 DEGREES
EKG VENTRICULAR RATE: 106 BPM
EOSINOPHILS RELATIVE PERCENT: 0 % (ref 1–4)
GFR AFRICAN AMERICAN: >60 ML/MIN
GFR NON-AFRICAN AMERICAN: >60 ML/MIN
GFR SERPL CREATININE-BSD FRML MDRD: ABNORMAL ML/MIN/{1.73_M2}
GLUCOSE BLD-MCNC: 135 MG/DL (ref 65–105)
GLUCOSE BLD-MCNC: 144 MG/DL (ref 70–99)
GLUCOSE BLD-MCNC: 163 MG/DL (ref 65–105)
GLUCOSE BLD-MCNC: 217 MG/DL (ref 65–105)
HCT VFR BLD CALC: 35.3 % (ref 36.3–47.1)
HEMOGLOBIN: 11.3 G/DL (ref 11.9–15.1)
IMMATURE GRANULOCYTES: 1 %
LYMPHOCYTES # BLD: 7 % (ref 24–43)
MCH RBC QN AUTO: 30 PG (ref 25.2–33.5)
MCHC RBC AUTO-ENTMCNC: 32 G/DL (ref 25.2–33.5)
MCV RBC AUTO: 93.6 FL (ref 82.6–102.9)
MONOCYTES # BLD: 4 % (ref 3–12)
NRBC AUTOMATED: 0 PER 100 WBC
PDW BLD-RTO: 13.2 % (ref 11.8–14.4)
PLATELET # BLD: 263 K/UL (ref 138–453)
PMV BLD AUTO: 11 FL (ref 8.1–13.5)
POTASSIUM SERPL-SCNC: 4.1 MMOL/L (ref 3.7–5.3)
RBC # BLD: 3.77 M/UL (ref 3.95–5.11)
SEG NEUTROPHILS: 88 % (ref 36–65)
SEGMENTED NEUTROPHILS ABSOLUTE COUNT: 15.59 K/UL (ref 1.5–8.1)
SODIUM BLD-SCNC: 141 MMOL/L (ref 135–144)
WBC # BLD: 17.7 K/UL (ref 3.5–11.3)

## 2022-07-24 PROCEDURE — 6360000002 HC RX W HCPCS: Performed by: INTERNAL MEDICINE

## 2022-07-24 PROCEDURE — 6370000000 HC RX 637 (ALT 250 FOR IP): Performed by: INTERNAL MEDICINE

## 2022-07-24 PROCEDURE — 1200000000 HC SEMI PRIVATE

## 2022-07-24 PROCEDURE — 94640 AIRWAY INHALATION TREATMENT: CPT

## 2022-07-24 PROCEDURE — 80048 BASIC METABOLIC PNL TOTAL CA: CPT

## 2022-07-24 PROCEDURE — 85025 COMPLETE CBC W/AUTO DIFF WBC: CPT

## 2022-07-24 PROCEDURE — 82947 ASSAY GLUCOSE BLOOD QUANT: CPT

## 2022-07-24 PROCEDURE — 6360000002 HC RX W HCPCS: Performed by: FAMILY MEDICINE

## 2022-07-24 PROCEDURE — 94760 N-INVAS EAR/PLS OXIMETRY 1: CPT

## 2022-07-24 PROCEDURE — 2580000003 HC RX 258: Performed by: INTERNAL MEDICINE

## 2022-07-24 PROCEDURE — 36415 COLL VENOUS BLD VENIPUNCTURE: CPT

## 2022-07-24 RX ORDER — PREDNISONE 20 MG/1
80 TABLET ORAL 2 TIMES DAILY
Status: DISCONTINUED | OUTPATIENT
Start: 2022-07-24 | End: 2022-07-24 | Stop reason: DRUGHIGH

## 2022-07-24 RX ORDER — METHYLPREDNISOLONE SODIUM SUCCINATE 125 MG/2ML
80 INJECTION, POWDER, LYOPHILIZED, FOR SOLUTION INTRAMUSCULAR; INTRAVENOUS EVERY 12 HOURS
Status: DISCONTINUED | OUTPATIENT
Start: 2022-07-24 | End: 2022-07-25 | Stop reason: HOSPADM

## 2022-07-24 RX ADMIN — IPRATROPIUM BROMIDE AND ALBUTEROL SULFATE 1 AMPULE: 2.5; .5 SOLUTION RESPIRATORY (INHALATION) at 15:57

## 2022-07-24 RX ADMIN — METOPROLOL TARTRATE 50 MG: 50 TABLET ORAL at 20:36

## 2022-07-24 RX ADMIN — OXYBUTYNIN CHLORIDE 10 MG: 5 TABLET, EXTENDED RELEASE ORAL at 08:36

## 2022-07-24 RX ADMIN — SODIUM CHLORIDE, PRESERVATIVE FREE 10 ML: 5 INJECTION INTRAVENOUS at 08:37

## 2022-07-24 RX ADMIN — METHYLPREDNISOLONE SODIUM SUCCINATE 80 MG: 125 INJECTION, POWDER, FOR SOLUTION INTRAMUSCULAR; INTRAVENOUS at 15:55

## 2022-07-24 RX ADMIN — AZITHROMYCIN MONOHYDRATE 500 MG: 500 INJECTION, POWDER, LYOPHILIZED, FOR SOLUTION INTRAVENOUS at 09:30

## 2022-07-24 RX ADMIN — IPRATROPIUM BROMIDE AND ALBUTEROL SULFATE 1 AMPULE: 2.5; .5 SOLUTION RESPIRATORY (INHALATION) at 23:52

## 2022-07-24 RX ADMIN — IPRATROPIUM BROMIDE AND ALBUTEROL SULFATE 1 AMPULE: 2.5; .5 SOLUTION RESPIRATORY (INHALATION) at 05:09

## 2022-07-24 RX ADMIN — SODIUM CHLORIDE, PRESERVATIVE FREE 10 ML: 5 INJECTION INTRAVENOUS at 20:38

## 2022-07-24 RX ADMIN — CITALOPRAM HYDROBROMIDE 40 MG: 20 TABLET ORAL at 08:36

## 2022-07-24 RX ADMIN — CEFTRIAXONE 1000 MG: 1 INJECTION, POWDER, FOR SOLUTION INTRAMUSCULAR; INTRAVENOUS at 08:50

## 2022-07-24 RX ADMIN — IPRATROPIUM BROMIDE AND ALBUTEROL SULFATE 1 AMPULE: 2.5; .5 SOLUTION RESPIRATORY (INHALATION) at 07:45

## 2022-07-24 RX ADMIN — METHYLPREDNISOLONE SODIUM SUCCINATE 125 MG: 125 INJECTION, POWDER, FOR SOLUTION INTRAMUSCULAR; INTRAVENOUS at 06:19

## 2022-07-24 RX ADMIN — BUSPIRONE HYDROCHLORIDE 5 MG: 5 TABLET ORAL at 08:36

## 2022-07-24 RX ADMIN — FLUDROCORTISONE ACETATE 0.1 MG: 0.1 TABLET ORAL at 08:36

## 2022-07-24 RX ADMIN — PANTOPRAZOLE SODIUM 40 MG: 40 TABLET, DELAYED RELEASE ORAL at 20:36

## 2022-07-24 RX ADMIN — MOMETASONE FUROATE AND FORMOTEROL FUMARATE DIHYDRATE 2 PUFF: 200; 5 AEROSOL RESPIRATORY (INHALATION) at 07:46

## 2022-07-24 RX ADMIN — PANTOPRAZOLE SODIUM 40 MG: 40 TABLET, DELAYED RELEASE ORAL at 08:36

## 2022-07-24 RX ADMIN — METOPROLOL TARTRATE 50 MG: 50 TABLET ORAL at 08:36

## 2022-07-24 RX ADMIN — MOMETASONE FUROATE AND FORMOTEROL FUMARATE DIHYDRATE 2 PUFF: 200; 5 AEROSOL RESPIRATORY (INHALATION) at 19:59

## 2022-07-24 RX ADMIN — ENOXAPARIN SODIUM 40 MG: 100 INJECTION SUBCUTANEOUS at 08:37

## 2022-07-24 RX ADMIN — INSULIN LISPRO 1 UNITS: 100 INJECTION, SOLUTION INTRAVENOUS; SUBCUTANEOUS at 12:00

## 2022-07-24 RX ADMIN — IPRATROPIUM BROMIDE AND ALBUTEROL SULFATE 1 AMPULE: 2.5; .5 SOLUTION RESPIRATORY (INHALATION) at 11:27

## 2022-07-24 RX ADMIN — IPRATROPIUM BROMIDE AND ALBUTEROL SULFATE 1 AMPULE: 2.5; .5 SOLUTION RESPIRATORY (INHALATION) at 19:58

## 2022-07-24 RX ADMIN — SODIUM CHLORIDE, PRESERVATIVE FREE 10 ML: 5 INJECTION INTRAVENOUS at 20:37

## 2022-07-24 RX ADMIN — BUSPIRONE HYDROCHLORIDE 5 MG: 5 TABLET ORAL at 20:36

## 2022-07-24 RX ADMIN — ATORVASTATIN CALCIUM 10 MG: 10 TABLET, FILM COATED ORAL at 20:36

## 2022-07-24 RX ADMIN — MIDODRINE HYDROCHLORIDE 5 MG: 5 TABLET ORAL at 08:36

## 2022-07-24 RX ADMIN — MONTELUKAST 10 MG: 10 TABLET, FILM COATED ORAL at 18:30

## 2022-07-24 RX ADMIN — SODIUM CHLORIDE, PRESERVATIVE FREE 10 ML: 5 INJECTION INTRAVENOUS at 06:20

## 2022-07-24 RX ADMIN — BUTALBITAL, ACETAMINOPHEN, AND CAFFEINE 2 TABLET: 50; 325; 40 TABLET ORAL at 20:35

## 2022-07-24 RX ADMIN — MIDODRINE HYDROCHLORIDE 5 MG: 5 TABLET ORAL at 12:00

## 2022-07-24 ASSESSMENT — PAIN DESCRIPTION - DESCRIPTORS: DESCRIPTORS: DISCOMFORT

## 2022-07-24 ASSESSMENT — PAIN SCALES - GENERAL: PAINLEVEL_OUTOF10: 2

## 2022-07-24 ASSESSMENT — PAIN DESCRIPTION - LOCATION: LOCATION: HEAD

## 2022-07-24 NOTE — PLAN OF CARE

## 2022-07-24 NOTE — PLAN OF CARE
Problem: Discharge Planning  Goal: Discharge to home or other facility with appropriate resources  7/24/2022 1040 by Winifred RN  Outcome: Progressing  Flowsheets (Taken 7/24/2022 6134)  Discharge to home or other facility with appropriate resources: Identify barriers to discharge with patient and caregiver  7/24/2022 0538 by Mark Waller RN  Outcome: Not Progressing     Problem: Pain  Goal: Verbalizes/displays adequate comfort level or baseline comfort level  7/24/2022 1040 by Winifred RN  Outcome: Progressing  7/24/2022 0538 by Mark Waller RN  Outcome: Not Progressing     Problem: ABCDS Injury Assessment  Goal: Absence of physical injury  7/24/2022 1040 by Winifred RN  Outcome: Progressing  7/24/2022 0538 by Mark Waller RN  Outcome: Not Progressing     Problem: Skin/Tissue Integrity  Goal: Absence of new skin breakdown  Description: 1. Monitor for areas of redness and/or skin breakdown  2. Assess vascular access sites hourly  3. Every 4-6 hours minimum:  Change oxygen saturation probe site  4. Every 4-6 hours:  If on nasal continuous positive airway pressure, respiratory therapy assess nares and determine need for appliance change or resting period.   7/24/2022 1040 by Winifred RN  Outcome: Progressing  7/24/2022 0538 by Mark Waller RN  Outcome: Not Progressing     Problem: Safety - Adult  Goal: Free from fall injury  7/24/2022 1040 by Winifred RN  Outcome: Progressing  7/24/2022 0538 by Mark Waller RN  Outcome: Not Progressing Yes

## 2022-07-24 NOTE — PROGRESS NOTES
Hospitalist Progress Note  7/24/2022 5:09 PM  Subjective:   Admit Date: 7/22/2022  PCP: PIERO Perez     Full Code      C/c:  Chief Complaint   Patient presents with    Shortness of Breath         Interval History: pt doing slightly better, pt breathing slightly better,    Diet: ADULT DIET;  Regular; 4 carb choices (60 gm/meal)                                ip days:2  Medications:   Scheduled Meds:   methylPREDNISolone  80 mg IntraVENous Q12H    azithromycin  500 mg IntraVENous Once    sodium chloride flush  10 mL IntraVENous 2 times per day    enoxaparin  40 mg SubCUTAneous Daily    ipratropium-albuterol  1 ampule Inhalation 6 times per day    mometasone-formoterol  2 puff Inhalation BID    busPIRone  5 mg Oral BID    citalopram  40 mg Oral Daily    fludrocortisone  0.1 mg Oral Daily    metoprolol tartrate  50 mg Oral BID    midodrine  5 mg Oral TID WC    montelukast  10 mg Oral QPM    nicotine  1 patch TransDERmal Q24H    oxybutynin  10 mg Oral Daily    pantoprazole  40 mg Oral BID    atorvastatin  10 mg Oral Nightly    azithromycin  500 mg IntraVENous Q24H    cefTRIAXone (ROCEPHIN) IV  1,000 mg IntraVENous Q24H    insulin lispro  0-4 Units SubCUTAneous TID WC    insulin lispro  0-4 Units SubCUTAneous Nightly     Continuous Infusions:   sodium chloride      dextrose       PRN Meds:.benzonatate, sodium chloride flush, sodium chloride, ondansetron, acetaminophen, sodium chloride nebulizer, albuterol, fluticasone, traZODone, lactulose, butalbital-acetaminophen-caffeine, glucose, dextrose bolus **OR** dextrose bolus, glucagon (rDNA), dextrose, zolpidem     CBC:   Recent Labs     07/22/22  1046 07/23/22  0536 07/24/22  0526   WBC 21.8* 13.2* 17.7*   HGB 11.8* 11.0* 11.3*    242 263     BMP:    Recent Labs     07/22/22  1046 07/23/22  0536 07/24/22  0526    141 141   K 4.2 4.2 4.1    102 101   CO2 27 29 29   BUN 17 17 23*   CREATININE 0.84 0.76 0.73   GLUCOSE 94 162* 144*     Hepatic:   Recent Labs     07/22/22  1046   AST 21   ALT 13   BILITOT 0.25*   ALKPHOS 86     Troponin: No results for input(s): TROPONINI in the last 72 hours. BNP: No results for input(s): BNP in the last 72 hours. Lipids: No results for input(s): CHOL, HDL in the last 72 hours. Invalid input(s): LDLCALCU  INR: No results for input(s): INR in the last 72 hours. Objective:   Vitals: /80   Pulse 78   Temp 97.2 °F (36.2 °C) (Tympanic)   Resp 17   Ht 5' 1.5\" (1.562 m)   Wt 134 lb 14.4 oz (61.2 kg)   LMP 08/01/2006   SpO2 96%   BMI 25.08 kg/m²   General appearance: alert, appears stated age and cooperative  Skin: Skin color, texture, turgor normal. No rashes or lesions  Lungs: clear to auscultation bilaterally  Heart: regular rate and rhythm, S1, S2 normal, no murmur, click, rub or gallop  Abdomen: soft, non-tender; bowel sounds normal; no masses,  no organomegaly  Extremities: extremities normal, atraumatic, no cyanosis or edema  Neurologic: Mental status: Alert, oriented, thought content appropriate    Prophylaxis:   DVT with  [x] lovenox        [] heparin        [] Scd        [] none:     Radiology:  XR CHEST PORTABLE    Result Date: 7/23/2022  EXAMINATION: ONE XRAY VIEW OF THE CHEST 7/23/2022 6:53 am COMPARISON: Chest radiograph performed 07/22/2022. HISTORY: ORDERING SYSTEM PROVIDED HISTORY: COPD TECHNOLOGIST PROVIDED HISTORY: COPD Reason for Exam: COPD FINDINGS: There are perihilar infiltrates. There is no effusion. There is no pneumothorax. The mediastinal structures are stable. The upper abdomen is unremarkable. The extrathoracic soft tissues are unremarkable. Perihilar infiltrates. XR CHEST PORTABLE    Result Date: 7/22/2022  EXAMINATION: ONE XRAY VIEW OF THE CHEST 7/22/2022 11:50 am COMPARISON: None.  HISTORY: ORDERING SYSTEM PROVIDED HISTORY: shortness of breath TECHNOLOGIST PROVIDED HISTORY: shortness of breath Reason for Exam: Right lower leg pain; pt fell off truck with right foot still stuck in truck bed; done portable FINDINGS: No acute airspace infiltrate. No pneumothorax or pleural effusion. Normal cardiomediastinal silhouette     No acute cardiopulmonary disease       Assessment : Ac ex copd/aerosol steroids/  Tobacco dep     Plan:   1. Decrease solumedrol to 80 mg bid   2. See order    Patient Active Problem List:     Vitamin D deficiency     Sinusitis, chronic     Migraine     Constipation     Genital herpes     Neurocardiogenic syncope     Chronic fatigue     Tobacco abuse     Atrophic vaginitis     Mixed hyperlipidemia     Lower urinary tract symptoms     COPD exacerbation ([facility] [address]      Anticipated Disposition upon discharge: [] Home                                                                         [] Home with Home Health                                                                         [] [facility]                                                                         [] [address] [facility] [address]      Patient is admitted as inpatient status because of co-morbidities listed above, severity of signs and symptoms as outlined, requirement for current medical therapies and most importantly because of direct risk to patient if care not provided in a hospital setting.           Tomas Denney MD, MD  Rounding Hospitalist   pt

## 2022-07-25 ENCOUNTER — APPOINTMENT (OUTPATIENT)
Dept: GENERAL RADIOLOGY | Age: 42
DRG: 192 | End: 2022-07-25
Payer: COMMERCIAL

## 2022-07-25 VITALS
OXYGEN SATURATION: 97 % | RESPIRATION RATE: 18 BRPM | HEIGHT: 62 IN | TEMPERATURE: 97.6 F | HEART RATE: 78 BPM | WEIGHT: 136.8 LBS | DIASTOLIC BLOOD PRESSURE: 64 MMHG | BODY MASS INDEX: 25.17 KG/M2 | SYSTOLIC BLOOD PRESSURE: 107 MMHG

## 2022-07-25 LAB
ABSOLUTE EOS #: <0.03 K/UL (ref 0–0.44)
ABSOLUTE IMMATURE GRANULOCYTE: 0.12 K/UL (ref 0–0.3)
ABSOLUTE LYMPH #: 1.46 K/UL (ref 1.1–3.7)
ABSOLUTE MONO #: 0.64 K/UL (ref 0.1–1.2)
ANION GAP SERPL CALCULATED.3IONS-SCNC: 10 MMOL/L (ref 9–17)
BASOPHILS # BLD: 0 % (ref 0–2)
BASOPHILS ABSOLUTE: <0.03 K/UL (ref 0–0.2)
BUN BLDV-MCNC: 22 MG/DL (ref 6–20)
BUN/CREAT BLD: 32 (ref 9–20)
CALCIUM SERPL-MCNC: 9.2 MG/DL (ref 8.6–10.4)
CHLORIDE BLD-SCNC: 104 MMOL/L (ref 98–107)
CO2: 29 MMOL/L (ref 20–31)
CREAT SERPL-MCNC: 0.69 MG/DL (ref 0.5–0.9)
EOSINOPHILS RELATIVE PERCENT: 0 % (ref 1–4)
GFR AFRICAN AMERICAN: >60 ML/MIN
GFR NON-AFRICAN AMERICAN: >60 ML/MIN
GFR SERPL CREATININE-BSD FRML MDRD: ABNORMAL ML/MIN/{1.73_M2}
GLUCOSE BLD-MCNC: 127 MG/DL (ref 70–99)
GLUCOSE BLD-MCNC: 246 MG/DL (ref 65–105)
HCT VFR BLD CALC: 33.6 % (ref 36.3–47.1)
HEMOGLOBIN: 11 G/DL (ref 11.9–15.1)
IMMATURE GRANULOCYTES: 1 %
LYMPHOCYTES # BLD: 11 % (ref 24–43)
MCH RBC QN AUTO: 30.1 PG (ref 25.2–33.5)
MCHC RBC AUTO-ENTMCNC: 32.7 G/DL (ref 25.2–33.5)
MCV RBC AUTO: 92.1 FL (ref 82.6–102.9)
MONOCYTES # BLD: 5 % (ref 3–12)
NRBC AUTOMATED: 0 PER 100 WBC
PDW BLD-RTO: 13 % (ref 11.8–14.4)
PLATELET # BLD: 266 K/UL (ref 138–453)
PMV BLD AUTO: 10.8 FL (ref 8.1–13.5)
POTASSIUM SERPL-SCNC: 3.6 MMOL/L (ref 3.7–5.3)
RBC # BLD: 3.65 M/UL (ref 3.95–5.11)
SEG NEUTROPHILS: 83 % (ref 36–65)
SEGMENTED NEUTROPHILS ABSOLUTE COUNT: 11.14 K/UL (ref 1.5–8.1)
SODIUM BLD-SCNC: 143 MMOL/L (ref 135–144)
WBC # BLD: 13.4 K/UL (ref 3.5–11.3)

## 2022-07-25 PROCEDURE — 6360000002 HC RX W HCPCS: Performed by: INTERNAL MEDICINE

## 2022-07-25 PROCEDURE — 82947 ASSAY GLUCOSE BLOOD QUANT: CPT

## 2022-07-25 PROCEDURE — 6370000000 HC RX 637 (ALT 250 FOR IP): Performed by: NURSE PRACTITIONER

## 2022-07-25 PROCEDURE — 2580000003 HC RX 258: Performed by: INTERNAL MEDICINE

## 2022-07-25 PROCEDURE — 80048 BASIC METABOLIC PNL TOTAL CA: CPT

## 2022-07-25 PROCEDURE — 6370000000 HC RX 637 (ALT 250 FOR IP): Performed by: INTERNAL MEDICINE

## 2022-07-25 PROCEDURE — 85025 COMPLETE CBC W/AUTO DIFF WBC: CPT

## 2022-07-25 PROCEDURE — 36415 COLL VENOUS BLD VENIPUNCTURE: CPT

## 2022-07-25 PROCEDURE — 6360000002 HC RX W HCPCS: Performed by: FAMILY MEDICINE

## 2022-07-25 PROCEDURE — 71045 X-RAY EXAM CHEST 1 VIEW: CPT

## 2022-07-25 PROCEDURE — 94640 AIRWAY INHALATION TREATMENT: CPT

## 2022-07-25 PROCEDURE — 99238 HOSP IP/OBS DSCHRG MGMT 30/<: CPT | Performed by: INTERNAL MEDICINE

## 2022-07-25 RX ORDER — CEFDINIR 300 MG/1
300 CAPSULE ORAL 2 TIMES DAILY
Qty: 6 CAPSULE | Refills: 0 | Status: SHIPPED | OUTPATIENT
Start: 2022-07-25 | End: 2022-07-28

## 2022-07-25 RX ORDER — ALPRAZOLAM 0.25 MG/1
0.5 TABLET ORAL EVERY 8 HOURS PRN
Status: DISCONTINUED | OUTPATIENT
Start: 2022-07-25 | End: 2022-07-25 | Stop reason: HOSPADM

## 2022-07-25 RX ORDER — GREEN TEA/HOODIA GORDONII 315-12.5MG
1 CAPSULE ORAL 3 TIMES DAILY
Qty: 30 TABLET | Refills: 0 | COMMUNITY
Start: 2022-07-25 | End: 2022-08-04

## 2022-07-25 RX ORDER — GLIMEPIRIDE 2 MG/1
2 TABLET ORAL EVERY MORNING
Qty: 4 TABLET | Refills: 3 | Status: SHIPPED | OUTPATIENT
Start: 2022-07-26 | End: 2022-07-30

## 2022-07-25 RX ORDER — PREDNISONE 20 MG/1
60 TABLET ORAL DAILY
Qty: 12 TABLET | Refills: 0 | Status: SHIPPED | OUTPATIENT
Start: 2022-07-26 | End: 2022-07-30

## 2022-07-25 RX ORDER — POTASSIUM CHLORIDE 20 MEQ/1
40 TABLET, EXTENDED RELEASE ORAL ONCE
Status: COMPLETED | OUTPATIENT
Start: 2022-07-25 | End: 2022-07-25

## 2022-07-25 RX ORDER — AZITHROMYCIN 500 MG/1
500 TABLET, FILM COATED ORAL DAILY
Qty: 3 TABLET | Refills: 0 | Status: SHIPPED | OUTPATIENT
Start: 2022-07-25 | End: 2022-07-28

## 2022-07-25 RX ADMIN — FLUDROCORTISONE ACETATE 0.1 MG: 0.1 TABLET ORAL at 09:42

## 2022-07-25 RX ADMIN — MIDODRINE HYDROCHLORIDE 5 MG: 5 TABLET ORAL at 11:27

## 2022-07-25 RX ADMIN — METOPROLOL TARTRATE 50 MG: 50 TABLET ORAL at 09:41

## 2022-07-25 RX ADMIN — ENOXAPARIN SODIUM 40 MG: 100 INJECTION SUBCUTANEOUS at 09:42

## 2022-07-25 RX ADMIN — MIDODRINE HYDROCHLORIDE 5 MG: 5 TABLET ORAL at 09:53

## 2022-07-25 RX ADMIN — INSULIN LISPRO 1 UNITS: 100 INJECTION, SOLUTION INTRAVENOUS; SUBCUTANEOUS at 11:30

## 2022-07-25 RX ADMIN — BUSPIRONE HYDROCHLORIDE 5 MG: 5 TABLET ORAL at 09:41

## 2022-07-25 RX ADMIN — CEFTRIAXONE 1000 MG: 1 INJECTION, POWDER, FOR SOLUTION INTRAMUSCULAR; INTRAVENOUS at 09:48

## 2022-07-25 RX ADMIN — IPRATROPIUM BROMIDE AND ALBUTEROL SULFATE 1 AMPULE: 2.5; .5 SOLUTION RESPIRATORY (INHALATION) at 08:18

## 2022-07-25 RX ADMIN — ALPRAZOLAM 0.5 MG: 0.25 TABLET ORAL at 11:26

## 2022-07-25 RX ADMIN — AZITHROMYCIN MONOHYDRATE 500 MG: 500 INJECTION, POWDER, LYOPHILIZED, FOR SOLUTION INTRAVENOUS at 10:51

## 2022-07-25 RX ADMIN — MOMETASONE FUROATE AND FORMOTEROL FUMARATE DIHYDRATE 2 PUFF: 200; 5 AEROSOL RESPIRATORY (INHALATION) at 08:18

## 2022-07-25 RX ADMIN — SODIUM CHLORIDE, PRESERVATIVE FREE 10 ML: 5 INJECTION INTRAVENOUS at 03:58

## 2022-07-25 RX ADMIN — METHYLPREDNISOLONE SODIUM SUCCINATE 80 MG: 125 INJECTION, POWDER, FOR SOLUTION INTRAMUSCULAR; INTRAVENOUS at 03:57

## 2022-07-25 RX ADMIN — PANTOPRAZOLE SODIUM 40 MG: 40 TABLET, DELAYED RELEASE ORAL at 09:42

## 2022-07-25 RX ADMIN — OXYBUTYNIN CHLORIDE 10 MG: 5 TABLET, EXTENDED RELEASE ORAL at 09:41

## 2022-07-25 RX ADMIN — CITALOPRAM HYDROBROMIDE 40 MG: 20 TABLET ORAL at 09:41

## 2022-07-25 RX ADMIN — SODIUM CHLORIDE, PRESERVATIVE FREE 10 ML: 5 INJECTION INTRAVENOUS at 09:50

## 2022-07-25 RX ADMIN — POTASSIUM CHLORIDE 40 MEQ: 20 TABLET, EXTENDED RELEASE ORAL at 09:53

## 2022-07-25 NOTE — DISCHARGE INSTR - DIET

## 2022-07-25 NOTE — FLOWSHEET NOTE
rounding in PCU. Assessment: Patient was sleeping. 07/25/22 1614   Encounter Summary   Encounter Overview/Reason  Attempted Encounter   Service Provided For: Patient   Referral/Consult From: Rounding   Support System Unknown   Last Encounter  07/25/22   Complexity of Encounter Low   Begin Time 1434   End Time  1435   Total Time Calculated 1 min   Assessment/Intervention/Outcome   Assessment Unable to assess   Intervention Other (Comment)  (patient was sleeping)   Outcome Other (comment)  (patient was sleeping)   Plan and Referrals   Plan/Referrals Other (Comment)  (patient was discharged)     Plan: Chaplains are available on site or on call 24/7 for spiritual and emotional support.     Electronically signed by Rossana Dillard on 7/25/2022 at 4:15 PM

## 2022-07-25 NOTE — PROGRESS NOTES
Hospitalist Progress Note    Patient:  Meghan Olivares     YOB: 1980    MRN: [id number]   Admit date: 7/22/2022     Acct: [de-identified]     PCP: PIERO Velasquez    CC--Interval History:    COPD exacerbation---acute bronchitis on chronic bronchitis--on Rocephin--Zithromax---CXR 7.25.2022---improved perihilar densities--mild scattered interstitial prominence---home 7.25.2022    HTN---131/89    K = 3.6 ---> potassium replacement    Tobacco abuse---cessation recommended---nicotine patch offered    See note below     All other ROS negative except noted in HPI    Diet:  ADULT DIET;  Regular; 4 carb choices (60 gm/meal)    Medications:  Scheduled Meds:   methylPREDNISolone  80 mg IntraVENous Q12H    azithromycin  500 mg IntraVENous Once    sodium chloride flush  10 mL IntraVENous 2 times per day    enoxaparin  40 mg SubCUTAneous Daily    ipratropium-albuterol  1 ampule Inhalation 6 times per day    mometasone-formoterol  2 puff Inhalation BID    busPIRone  5 mg Oral BID    citalopram  40 mg Oral Daily    fludrocortisone  0.1 mg Oral Daily    metoprolol tartrate  50 mg Oral BID    midodrine  5 mg Oral TID WC    montelukast  10 mg Oral QPM    nicotine  1 patch TransDERmal Q24H    oxybutynin  10 mg Oral Daily    pantoprazole  40 mg Oral BID    atorvastatin  10 mg Oral Nightly    azithromycin  500 mg IntraVENous Q24H    cefTRIAXone (ROCEPHIN) IV  1,000 mg IntraVENous Q24H    insulin lispro  0-4 Units SubCUTAneous TID WC    insulin lispro  0-4 Units SubCUTAneous Nightly     Continuous Infusions:   sodium chloride      dextrose       PRN Meds:ALPRAZolam, benzonatate, sodium chloride flush, sodium chloride, ondansetron, acetaminophen, sodium chloride nebulizer, albuterol, fluticasone, traZODone, lactulose, butalbital-acetaminophen-caffeine, glucose, dextrose bolus **OR** dextrose bolus, glucagon (rDNA), dextrose, zolpidem    Objective:  Labs:  CBC with Differential:    Lab Results   Component Value Date/Time WBC 13.4 07/25/2022 05:25 AM    RBC 3.65 07/25/2022 05:25 AM    HGB 11.0 07/25/2022 05:25 AM    HCT 33.6 07/25/2022 05:25 AM     07/25/2022 05:25 AM    MCV 92.1 07/25/2022 05:25 AM    MCH 30.1 07/25/2022 05:25 AM    MCHC 32.7 07/25/2022 05:25 AM    RDW 13.0 07/25/2022 05:25 AM    LYMPHOPCT 11 07/25/2022 05:25 AM    MONOPCT 5 07/25/2022 05:25 AM    BASOPCT 0 07/25/2022 05:25 AM    MONOSABS 0.64 07/25/2022 05:25 AM    LYMPHSABS 1.46 07/25/2022 05:25 AM    EOSABS <0.03 07/25/2022 05:25 AM    BASOSABS <0.03 07/25/2022 05:25 AM    DIFFTYPE NOT REPORTED 11/13/2018 12:04 PM     BMP:    Lab Results   Component Value Date/Time     07/25/2022 05:25 AM    K 3.6 07/25/2022 05:25 AM     07/25/2022 05:25 AM    CO2 29 07/25/2022 05:25 AM    BUN 22 07/25/2022 05:25 AM    LABALBU 4.4 07/22/2022 10:46 AM    CREATININE 0.69 07/25/2022 05:25 AM    CALCIUM 9.2 07/25/2022 05:25 AM    GFRAA >60 07/25/2022 05:25 AM    LABGLOM >60 07/25/2022 05:25 AM    GLUCOSE 127 07/25/2022 05:25 AM           Physical Exam:  Vitals: /64   Pulse 78   Temp 97.6 °F (36.4 °C) (Tympanic)   Resp 18   Ht 5' 1.5\" (1.562 m)   Wt 136 lb 12.8 oz (62.1 kg)   LMP 08/01/2006   SpO2 97%   BMI 25.43 kg/m²   24 hour intake/output:  Intake/Output Summary (Last 24 hours) at 7/25/2022 1244  Last data filed at 7/24/2022 1829  Gross per 24 hour   Intake 621.07 ml   Output --   Net 621.07 ml     Last 3 weights: Wt Readings from Last 3 Encounters:   07/25/22 136 lb 12.8 oz (62.1 kg)   07/02/22 133 lb (60.3 kg)   05/23/22 135 lb 6.4 oz (61.4 kg)     HEENT: Normocephalic and Atraumatic  Neck: Supple, No Masses, Tenderness, Nodularity, and No Lymphadenopathy  Chest/Lungs: Distant Breath Sounds  Cardiac: Regular Rate and Rhythm  GI/Abdomen:  Bowel Sounds Present and Soft, Non-tender, without Guarding or Rebound Tenderness  : Not examined  EXT/Skin:  multiple tattoos none infected appearing, No Edema, No Cyanosis, and No Clubbing---healing superficial abrasions BLE shins [has puppies at home]  Neuro: Alert and Oriented, to Person, to Time, to Place, to Situation, and No Localizing Signs/Symptoms      Assessment:    Principal Problem:    COPD exacerbation ([address]  Resolved Problems:    * No resolved hospital problems. *  THIAGO,  [address]    Michael Garcia, PAC--FP]  FULL CODE   LOVENOX    SUPPLEMENTAL OXYGEN---hospital only  COVID-19--NEGATIVE    Anti-infectives:   Rocephin IV, Zithromax IV        [Solumedrol IV]    COPD exacerbation--7.22.2022--acute bronchitis superposed on chronic bronchitis  Leukocytosis--7.22.2022          CXR--7.25.2022----improved perihilar densities---mild scattered interstitial prominence          CXR---7.22.2022---[-]          EKG----7.22.2022---sinus tachycardia-106--LAE  HTN   Neurocardiogenic syncope--history   Hyperlipidemia   Prior substance abuse---crack cocaine----none 20 years  Tobacco abuse--1 PPD  PMH:    atrophic vaginitis, chronic fatigue, constipation, endometriosis, genital herpes,               ovarian cyst, PID--pelvic inflammatory disease, pneumonia---2022, chronic               sinusitis, Vitamin D deficiency  PSH:    [id number], appendectomy, wisdom tooth extraction, laparoscopy    Allergies:  PCN--amoxicillin    Patient received smoking cessation information during this hospitalization. Pt was given the [address] number.      Smoking cessation medication patient was given at discharge:  Nicotine Patch    Reason no smoking cessation medication given Not Applicable    Plan:     Home---7.25.2022     Medications reviewed     IV ---> PO antibiotics     Hyperglycemia--steroids---Amaryl 2 mg while on steroids only     HTN--tachycardia---Lopressor      Tobacco abuse--nicotine patch      Follow up [address] English PAC--FP     See orders     Electronically signed by Jose Huerta on 7/25/2022 at 12:44 PM    Hospitalist

## 2022-07-26 ENCOUNTER — TELEPHONE (OUTPATIENT)
Dept: FAMILY MEDICINE CLINIC | Age: 42
End: 2022-07-26

## 2022-07-26 NOTE — DISCHARGE SUMMARY
[address]                 [address] [place] [address], 00 [facility]                               DISCHARGE SUMMARY    PATIENT NAME: Kaylene West                :        1980  MED REC NO:   [id number]                             ROOM:       0214  ACCOUNT NO:   [de-identified]                           ADMIT DATE: 2022  PROVIDER:     Hunter Ma. Soraya Leung MD                  DISCHARGE DATE:  2022    ATTENDING PHYSICIAN OF HOSPITALIZATION:  Lacie Guerra MD, at discharge. PERSONAL CARE PROVIDER:  Desmond Cali PA-C, [facility]. DIAGNOSES:  1. COPD exacerbation, 2022, acute bronchitis superimposed on  chronic bronchitis. 2.  Leukocytosis, 2022. 3.  Chest x-ray, 2022, improved perihilar densities, some mild  scattered interstitial prominence. Chest x-ray, 2022, no  significant findings. 4.  EKG, 2022, sinus tachycardia, rate 106, LAE. 5.  Hypertension. 6.  History of neurocardiogenic syncope, none this hospitalization. 7.  Hyperlipidemia. 8.  Tobacco abuse, one pack a day, advised to quit absolutely and  completely. 9.  Hypokalemia, for which replacement therapy was given. Other medical problems set forth in the progress note of 2022,  incorporated for reference herein. HISTORY OF PRESENT ILLNESS AND HOSPITAL COURSE:  The patient is a  42-year-old smoker, patient of Desmond Cali PA-C, [facility]. The  patient had presented with increasing shortness of breath, cough,  required supplemental oxygen initially during the course of her  hospitalization, now at room air 96%. The patient was treated with a  combination of Rocephin, Zithromax, med-neb treatments, supplemental  oxygen and IV steroids namely Solu-Medrol. At the time of discharge,  the patient was converted to oral antibiotics together with prednisone. The patient has a history of hypertension, blood pressure 131/89. Prior  neurocardiogenic syncope, no events this hospitalization. LABORATORY DATA:  Around the time of discharge, white cell count down to  13.4 down from 17.7 (steroids); hemoglobin 11.0; hematocrit 33.6; and  platelets 250,821. Sodium 143, potassium 3.6 to 4.1, chloride 104, CO2  of 29, BUN 22, creatinine 0.69, glucose 127, calcium 9.2, GFR greater  than 60. DISCHARGE INSTRUCTIONS/FOLLOWUP:  Discharged to home on 07/25/2022. DIET:  General, as tolerated. ACTIVITY: As tolerated. CONDITION AT DISCHARGE:  Fair, improved. MEDICATIONS:  NEW:  Zithromax 500 mg p.o. daily, 3 days; cefdinir (Omnicef) 300 mg  p.o. b.i.d., 3 days; glimepiride (Amaryl) 2 mg p.o. q.a.m. 4 days while  on steroids only; metoprolol tartrate (Lopressor) 50 mg p.o. b.i.d.;  prednisone 60 mg daily for 4 days, then stop; probiotic acidophilus one  three times a day; nicotine 21 mg per 24-hour patch, topical, change  daily, advised may not smoke with the patch in place. FOLLOWING MEDICATIONS CONTINUED WITHOUT CHANGE:  Albuterol sulfate HFA 2  puffs every 4 hours p.r.n. shortness of breath, wheezing; the patient's  own volition, black cohosh root 600 mg twice daily p.r.n. hot flashes;  Symbicort (budesonide/formoterol) 160/4.5, one inhalation twice daily;  buspirone (BuSpar) 5 mg p.o. b.i.d.; citalopram (Celexa) 40 mg p.o.  daily; diphenhydramine (Benadryl) 25 mg p.o. nightly p.r.n. itching;  Florinef 0.1 mg p.o. daily, orthostatic hypotension; Nasalide  (flunisolide) 25 mcg, 0.25% solution, 2 sprays both nostrils daily;  midodrine 5 mg p.o. three times a day; montelukast (Singulair) 10 mg  p.o. q.p.m.; multivitamin one p.o. daily; oxybutynin 10 mg extended  release p.o. daily; pantoprazole (Protonix) 40 mg p.o. b.i.d.;  simvastatin (Zocor) 20 mg p.o. daily; trazodone (Desyrel) 50 mg p.o. at  bedtime p.r.n. sleep; vitamin D 1000 units 2 tablets daily.     Medication discontinued was Xyzal.    Follow up with the patient's personal care provider, Shayne Moscoso PA-C,  [facility], [facility]. Any aspect of the patient's care not discussed in the chart and/or  dictation will be addressed and treated as an outpatient. The patient's medications have been reviewed including, but not limited  to, pre-hospital, hospital and discharge medications. The patient  and/or the patient's personal representatives were specifically advised  the only medications to be taken are those set forth in the discharge  orders and no other medications should be taken. Any prior medications  not on the discharge orders are specifically discontinued.         Esau Mays MD    D: 07/25/2022 18:52:41       T: 07/25/2022 18:55:07     [email or web address]  Job#: [id number]     Doc#: [id number]    CC:

## 2022-07-27 ENCOUNTER — TELEPHONE (OUTPATIENT)
Dept: FAMILY MEDICINE CLINIC | Age: 42
End: 2022-07-27

## 2022-07-27 LAB
CULTURE: NORMAL
CULTURE: NORMAL
SPECIMEN DESCRIPTION: NORMAL
SPECIMEN DESCRIPTION: NORMAL

## 2022-07-27 NOTE — TELEPHONE ENCOUNTER
Patient is calling stating that she needs to see Bernice Bacon. This writer offered an appointment Aug 2 but she stated that it needs to be today because she has to go back to work today and she needs today off cause she doesn't feel well.  Patient would like to be called back as soon as possible because she would like to get some sleep

## 2022-07-27 NOTE — TELEPHONE ENCOUNTER
Jessica [facility] Transitions Initial Follow Up Call    Outreach made within 2 business days of discharge: Yes    Patient: Bar Dunaway Patient : 1980   MRN: [id number]  Reason for Admission: There are no discharge diagnoses documented for the most recent discharge. Discharge Date: 22       Spoke with: Jose Maria Lopez    Discharge department/facility:     TCM Interactive Patient Contact:  Was patient able to fill all prescriptions: Yes  Was patient instructed to bring all medications to the follow-up visit: Yes  Is patient taking all medications as directed in the discharge summary?  Yes  Does patient understand their discharge instructions: Yes  Does patient have questions or concerns that need addressed prior to 7-14 day follow up office visit: yes - excuse off work for  and      Scheduled appointment with PCP within 7-14 days  22 @ 10:00am hospital follow up    Follow Up  Future Appointments   Date Time Provider Nabil Coulter   2022  8:40 AM Padmini Butterfield MD DENT [facility]   2022  9:00 AM Flora Spence MD DPULM [facility]   2022  8:20 AM PIERO Hutchinson DFAM [facility]   2023  8:00 AM [facility] Ribera [facility]       Gumaro Stockton [address] [place] [address] Mannie

## 2022-08-05 ENCOUNTER — OFFICE VISIT (OUTPATIENT)
Dept: FAMILY MEDICINE CLINIC | Age: 42
End: 2022-08-05
Payer: COMMERCIAL

## 2022-08-05 VITALS
HEIGHT: 62 IN | WEIGHT: 137 LBS | SYSTOLIC BLOOD PRESSURE: 102 MMHG | BODY MASS INDEX: 25.21 KG/M2 | DIASTOLIC BLOOD PRESSURE: 70 MMHG | OXYGEN SATURATION: 99 % | HEART RATE: 102 BPM

## 2022-08-05 DIAGNOSIS — J44.1 COPD EXACERBATION (HCC): ICD-10-CM

## 2022-08-05 DIAGNOSIS — E78.2 MIXED HYPERLIPIDEMIA: ICD-10-CM

## 2022-08-05 DIAGNOSIS — K21.9 GASTROESOPHAGEAL REFLUX DISEASE, UNSPECIFIED WHETHER ESOPHAGITIS PRESENT: ICD-10-CM

## 2022-08-05 DIAGNOSIS — G47.00 INSOMNIA, UNSPECIFIED TYPE: ICD-10-CM

## 2022-08-05 DIAGNOSIS — Z09 HOSPITAL DISCHARGE FOLLOW-UP: Primary | ICD-10-CM

## 2022-08-05 DIAGNOSIS — F41.8 ANXIETY WITH DEPRESSION: ICD-10-CM

## 2022-08-05 PROCEDURE — 1111F DSCHRG MED/CURRENT MED MERGE: CPT | Performed by: PHYSICIAN ASSISTANT

## 2022-08-05 PROCEDURE — 99495 TRANSJ CARE MGMT MOD F2F 14D: CPT | Performed by: PHYSICIAN ASSISTANT

## 2022-08-05 RX ORDER — TRAZODONE HYDROCHLORIDE 50 MG/1
50 TABLET ORAL NIGHTLY PRN
Qty: 90 TABLET | Refills: 1 | Status: SHIPPED | OUTPATIENT
Start: 2022-08-05

## 2022-08-05 RX ORDER — CITALOPRAM 40 MG/1
40 TABLET ORAL DAILY
Qty: 90 TABLET | Refills: 1 | Status: SHIPPED | OUTPATIENT
Start: 2022-08-05

## 2022-08-05 RX ORDER — SIMVASTATIN 20 MG
TABLET ORAL
Qty: 90 TABLET | Refills: 1 | Status: SHIPPED | OUTPATIENT
Start: 2022-08-05

## 2022-08-05 RX ORDER — PANTOPRAZOLE SODIUM 40 MG/1
TABLET, DELAYED RELEASE ORAL
Qty: 180 TABLET | Refills: 1 | Status: SHIPPED | OUTPATIENT
Start: 2022-08-05

## 2022-08-05 RX ORDER — ALPRAZOLAM 0.25 MG/1
0.25 TABLET ORAL 3 TIMES DAILY PRN
Qty: 15 TABLET | Refills: 0 | Status: SHIPPED | OUTPATIENT
Start: 2022-08-05 | End: 2022-09-04

## 2022-08-05 RX ORDER — LEVOCETIRIZINE DIHYDROCHLORIDE 5 MG/1
5 TABLET, FILM COATED ORAL NIGHTLY
Qty: 90 TABLET | Refills: 1 | Status: SHIPPED | OUTPATIENT
Start: 2022-08-05

## 2022-08-05 RX ORDER — BUSPIRONE HYDROCHLORIDE 5 MG/1
TABLET ORAL
Qty: 180 TABLET | Refills: 1 | Status: SHIPPED | OUTPATIENT
Start: 2022-08-05

## 2022-08-05 RX ORDER — IPRATROPIUM BROMIDE AND ALBUTEROL SULFATE 2.5; .5 MG/3ML; MG/3ML
1 SOLUTION RESPIRATORY (INHALATION) EVERY 4 HOURS
Qty: 360 ML | Refills: 2 | Status: SHIPPED | OUTPATIENT
Start: 2022-08-05

## 2022-08-05 ASSESSMENT — ENCOUNTER SYMPTOMS
COUGH: 1
CHEST TIGHTNESS: 1

## 2022-08-05 NOTE — PROGRESS NOTES
Post-Discharge Transitional Care Follow Up      Deedee Joaquin   YOB: 1980    Date of Office Visit:  8/5/2022  Date of Hospital Admission: 7/22/22  Date of Hospital Discharge: 7/25/22  Readmission Risk Score (high >=14%. Medium >=10%):Readmission Risk Score: 9.2      Care management risk score Rising risk (score 2-5) and Complex Care (Scores >=6): No Risk Score On File     Non face to face  following discharge, date last encounter closed (first attempt may have been earlier): 7/27/2022  9:46 AM     Call initiated 2 business days of discharge: Yes     Hospital discharge follow-up  -     UT DISCHARGE MEDS RECONCILED W/ CURRENT OUTPATIENT MED LIST  COPD exacerbation ([facility] [address]  -     ipratropium-albuterol (DUONEB) 0.5-2.5 (3) MG/3ML SOLN nebulizer solution; Inhale 3 mLs into the lungs every 4 hours, Disp-360 mL, R-2Normal  -     Nebulizers (COMPRESSOR/NEBULIZER) MISC; Disp-1 each, R-3, PrintWith tubing and supplies  Gastroesophageal reflux disease, unspecified whether esophagitis present  -     pantoprazole (PROTONIX) 40 MG tablet; TAKE ONE TABLET BY MOUTH TWICE A DAY, Disp-180 tablet, R-1Normal  Mixed hyperlipidemia  -     simvastatin (ZOCOR) 20 MG tablet; TAKE ONE TABLET BY MOUTH ONCE NIGHTLY, Disp-90 tablet, R-1Normal  Anxiety with depression  -     busPIRone (BUSPAR) 5 MG tablet; TAKE ONE TABLET BY MOUTH TWICE A DAY, Disp-180 tablet, R-1Normal  -     citalopram (CELEXA) 40 MG tablet; Take 1 tablet by mouth in the morning., Disp-90 tablet, R-1Normal  -     ALPRAZolam (XANAX) 0.25 MG tablet; Take 1 tablet by mouth 3 times daily as needed for Anxiety for up to 30 days. , Disp-15 tablet, R-0Normal  Insomnia, unspecified type  -     traZODone (DESYREL) 50 MG tablet; Take 1 tablet by mouth nightly as needed for Sleep, Disp-90 tablet, R-1Normal      Medical Decision Making: moderate complexity  Return in about 3 months (around 11/5/2022).            Subjective:   Patient is evaluated for hospital follow-up and COPD exacerbation. Patient says that she is feeling better since hospitalization. Patient states that she was having upper respiratory symptoms for a couple weeks prior to hospitalization. Patient is scheduled with pulmonology in the near future. She is taking Symbicort on a routine basis. She does not have a nebulizer for home use. She continues to smoke against medical advice. She does need refills of chronic medications. Patient does admit to situational anxiety at times. She denies any additional concerns or complaints today. Inpatient course: Discharge summary reviewed- see chart. Interval history/Current status: reviewed    Patient Active Problem List   Diagnosis    Vitamin D deficiency    Sinusitis, chronic    Migraine    Constipation    Genital herpes    Neurocardiogenic syncope    Chronic fatigue    Tobacco abuse    Atrophic vaginitis    Mixed hyperlipidemia    Lower urinary tract symptoms    COPD exacerbation (HCC)       Medications listed as ordered at the time of discharge from hospital     Medication List            Accurate as of August 5, 2022 10:40 AM. If you have any questions, ask your nurse or doctor. START taking these medications      ALPRAZolam 0.25 MG tablet  Commonly known as: Xanax  Take 1 tablet by mouth 3 times daily as needed for Anxiety for up to 30 days.   Started by: PIERO Rockwell     Compressor/Nebulizer Misc  With tubing and supplies  Started by: PIERO Rockwell     ipratropium-albuterol 0.5-2.5 (3) MG/3ML Soln nebulizer solution  Commonly known as: DUONEB  Inhale 3 mLs into the lungs every 4 hours  Started by: PIERO Rockwell     levocetirizine 5 MG tablet  Commonly known as: Xyzal Allergy 24HR  Take 1 tablet by mouth nightly  Started by: PIERO Rockwell            CONTINUE taking these medications      albuterol sulfate  (90 Base) MCG/ACT inhaler  Commonly known as: Proventil HFA  Inhale 2 puffs into the lungs every 4 hours as needed for Wheezing     BLACK COHOSH ROOT     budesonide-formoterol 160-4.5 MCG/ACT Aero  Commonly known as: Symbicort  Inhale 1 puff into the lungs 2 times daily     busPIRone 5 MG tablet  Commonly known as: BUSPAR  TAKE ONE TABLET BY MOUTH TWICE A DAY     citalopram 40 MG tablet  Commonly known as: CELEXA  Take 1 tablet by mouth in the morning. diphenhydrAMINE 25 MG tablet  Commonly known as: BENADRYL     fludrocortisone 0.1 MG tablet  Commonly known as: FLORINEF  Take 1 tablet by mouth daily     flunisolide 25 MCG/ACT (0.025%) Soln  Commonly known as: NASALIDE  2 sprays into both nostrils once a day     glimepiride 2 MG tablet  Commonly known as: Amaryl  Take 1 tablet by mouth every morning for 4 days     metoprolol tartrate 25 MG tablet  Commonly known as: LOPRESSOR  Take 2 tablets by mouth in the morning and 2 tablets before bedtime.      midodrine 5 MG tablet  Commonly known as: PROAMATINE  Take 1 tablet by mouth 3 times daily     montelukast 10 MG tablet  Commonly known as: SINGULAIR  TAKE ONE TABLET BY MOUTH EVERY EVENING     MULTIVITAMIN PO     nicotine 21 MG/24HR  Commonly known as: Nicoderm CQ  Place 1 patch onto the skin every 24 hours     oxybutynin 10 MG extended release tablet  Commonly known as: Ditropan XL  Take 1 tablet by mouth daily     pantoprazole 40 MG tablet  Commonly known as: PROTONIX  TAKE ONE TABLET BY MOUTH TWICE A DAY     simvastatin 20 MG tablet  Commonly known as: ZOCOR  TAKE ONE TABLET BY MOUTH ONCE NIGHTLY     Spacer/Aero-Holding Chambers Ilana  1 Device by Does not apply route as needed (To be used with albuterol inhaler)     traZODone 50 MG tablet  Commonly known as: DESYREL  Take 1 tablet by mouth nightly as needed for Sleep     vitamin D 1000 UNIT Tabs tablet  Commonly known as: CHOLECALCIFEROL               Where to Get Your Medications        These medications were sent to [address] [place] [address] Adria Guo [phone] [address] [address]      Phone: [phone]   ALPRAZolam 0.25 MG tablet  busPIRone 5 MG tablet  citalopram 40 MG tablet  ipratropium-albuterol 0.5-2.5 (3) MG/3ML Soln nebulizer solution  levocetirizine 5 MG tablet  metoprolol tartrate 25 MG tablet  pantoprazole 40 MG tablet  simvastatin 20 MG tablet  traZODone 50 MG tablet       You can get these medications from any pharmacy    Bring a paper prescription for each of these medications  Compressor/Nebulizer Misc          Medications marked \"taking\" at this time  Outpatient Medications Marked as Taking for the 8/5/22 encounter (Office Visit) with PIERO Coffey   Medication Sig Dispense Refill    metoprolol tartrate (LOPRESSOR) 25 MG tablet Take 2 tablets by mouth in the morning and 2 tablets before bedtime. 360 tablet 1    pantoprazole (PROTONIX) 40 MG tablet TAKE ONE TABLET BY MOUTH TWICE A  tablet 1    simvastatin (ZOCOR) 20 MG tablet TAKE ONE TABLET BY MOUTH ONCE NIGHTLY 90 tablet 1    busPIRone (BUSPAR) 5 MG tablet TAKE ONE TABLET BY MOUTH TWICE A  tablet 1    citalopram (CELEXA) 40 MG tablet Take 1 tablet by mouth in the morning. 90 tablet 1    traZODone (DESYREL) 50 MG tablet Take 1 tablet by mouth nightly as needed for Sleep 90 tablet 1    ipratropium-albuterol (DUONEB) 0.5-2.5 (3) MG/3ML SOLN nebulizer solution Inhale 3 mLs into the lungs every 4 hours 360 mL 2    Nebulizers (COMPRESSOR/NEBULIZER) MISC With tubing and supplies 1 each 3    levocetirizine (XYZAL ALLERGY 24HR) 5 MG tablet Take 1 tablet by mouth nightly 90 tablet 1    ALPRAZolam (XANAX) 0.25 MG tablet Take 1 tablet by mouth 3 times daily as needed for Anxiety for up to 30 days. 15 tablet 0        Medications patient taking as of now reconciled against medications ordered at time of hospital discharge: Yes    Review of Systems   Constitutional:  Negative for fever. HENT: Negative. Respiratory:  Positive for cough and chest tightness. Cardiovascular: Negative. Musculoskeletal: Negative. Skin: Negative. Psychiatric/Behavioral:  Positive for agitation. Objective:    /70 (Site: Left Upper Arm)   Pulse (!) 102   Ht 5' 1.5\" (1.562 m)   Wt 137 lb (62.1 kg)   LMP 08/01/2006   SpO2 99%   BMI 25.47 kg/m²   Physical Exam  HENT:      Head: Normocephalic. Right Ear: Tympanic membrane normal.      Left Ear: Tympanic membrane normal.      Mouth/Throat:      Mouth: Mucous membranes are moist.   Eyes:      Pupils: Pupils are equal, round, and reactive to light. Cardiovascular:      Rate and Rhythm: Normal rate. Pulmonary:      Effort: Pulmonary effort is normal.      Comments: Decreased breath sounds  Musculoskeletal:      Cervical back: Neck supple. Skin:     General: Skin is warm. Neurological:      General: No focal deficit present. Mental Status: She is alert and oriented to person, place, and time. Psychiatric:         Mood and Affect: Mood normal.       An electronic signature was used to authenticate this note.   --Lissa Speaker, PA

## 2022-08-13 DIAGNOSIS — R55 NEUROCARDIOGENIC SYNCOPE: ICD-10-CM

## 2022-08-15 ENCOUNTER — TELEPHONE (OUTPATIENT)
Dept: SURGERY | Age: 42
End: 2022-08-15

## 2022-08-15 DIAGNOSIS — R55 NEUROCARDIOGENIC SYNCOPE: ICD-10-CM

## 2022-08-15 DIAGNOSIS — R39.9 LOWER URINARY TRACT SYMPTOMS: Primary | ICD-10-CM

## 2022-08-15 RX ORDER — MIDODRINE HYDROCHLORIDE 5 MG/1
TABLET ORAL
Qty: 270 TABLET | Refills: 1 | Status: SHIPPED | OUTPATIENT
Start: 2022-08-15

## 2022-08-15 RX ORDER — MIDODRINE HYDROCHLORIDE 5 MG/1
TABLET ORAL
Qty: 270 TABLET | Refills: 1 | OUTPATIENT
Start: 2022-08-15

## 2022-08-15 RX ORDER — OXYBUTYNIN CHLORIDE 10 MG/1
10 TABLET, EXTENDED RELEASE ORAL DAILY
Qty: 90 TABLET | Refills: 3 | Status: SHIPPED | OUTPATIENT
Start: 2022-08-15

## 2022-08-31 ENCOUNTER — OFFICE VISIT (OUTPATIENT)
Dept: PULMONOLOGY | Age: 42
End: 2022-08-31
Payer: COMMERCIAL

## 2022-08-31 VITALS
HEIGHT: 62 IN | OXYGEN SATURATION: 95 % | WEIGHT: 142.4 LBS | TEMPERATURE: 96.4 F | HEART RATE: 112 BPM | BODY MASS INDEX: 26.2 KG/M2 | DIASTOLIC BLOOD PRESSURE: 82 MMHG | SYSTOLIC BLOOD PRESSURE: 118 MMHG

## 2022-08-31 DIAGNOSIS — J44.9 CHRONIC OBSTRUCTIVE PULMONARY DISEASE, UNSPECIFIED COPD TYPE (HCC): ICD-10-CM

## 2022-08-31 DIAGNOSIS — F17.200 SMOKER: ICD-10-CM

## 2022-08-31 DIAGNOSIS — J43.9 MIXED RESTRICTIVE AND OBSTRUCTIVE LUNG DISEASE (HCC): Primary | ICD-10-CM

## 2022-08-31 DIAGNOSIS — J98.4 MIXED RESTRICTIVE AND OBSTRUCTIVE LUNG DISEASE (HCC): Primary | ICD-10-CM

## 2022-08-31 DIAGNOSIS — R91.8 PULMONARY INFILTRATE ON CHEST X-RAY: ICD-10-CM

## 2022-08-31 PROCEDURE — 99204 OFFICE O/P NEW MOD 45 MIN: CPT | Performed by: INTERNAL MEDICINE

## 2022-08-31 NOTE — PROGRESS NOTES
h/o diabetes    Diabetes Maternal Grandmother         diabetes    Diabetes Maternal Grandfather         diabetes    Cancer Paternal Grandfather 76        Acute Leukemia and infection/sudden death    Diabetes Maternal Uncle         Type 2       SURGICAL HISTORY:   Past Surgical History:   Procedure Laterality Date    APPENDECTOMY      LAPAROSCOPY      X3 before the hysterectomy    OTHER SURGICAL HISTORY  2016    lump removed lower right leg    KRISTIN AND BSO (CERVIX REMOVED)  2006    total with bilateral salpingo-oophorectomy    WISDOM TOOTH EXTRACTION             SOCIAL AND OCCUPATIONAL HEALTH:        Social History     Socioeconomic History    Marital status:      Spouse name: None    Number of children: None    Years of education: None    Highest education level: None   Occupational History    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[address]: [employer]   Tobacco Use    Smoking status: Every Day     Packs/day: 1.00     Years: 15.00     Pack years: 15.00     Types: Cigarettes     Start date: 1/1/1998    Smokeless tobacco: Never    Tobacco comments:     3 cigarettes in last 3 weeks 5/17/2022   Vaping Use    Vaping Use: Never used   Substance and Sexual Activity    Alcohol use: Yes     Alcohol/week: 0.0 standard drinks     Comment: RARE    Drug use: No     Types: Cocaine     Comment: HAS BEEN OFF CRACK COCAINE FOR SEVERAL YEARS    Sexual activity: Yes     Partners: Male     Birth control/protection: Surgical     Comment:      Social Determinants of Health     Financial Resource Strain: Low Risk     Difficulty of Paying Living Expenses: Not hard at all   Food Insecurity: No Food Insecurity    Worried About Running Out of Food in the Last Year: Never true    Ran Out of Food in the Last Year: Never true        TOBACCO:   reports that she has been smoking cigarettes. She started smoking about 24 years ago. She has a 15.00 pack-year smoking history.  She has never used smokeless tobacco.  ETOH:   reports current alcohol use. ALLERGIES:      Allergies   Allergen Reactions    Amoxicillin Nausea Only     GI UPSET    Other      seaosanal allergies      Pcn [Penicillins] Nausea Only     GI UPSET         Home Meds:   Prior to Admission medications    Medication Sig Start Date End Date Taking? Authorizing Provider   midodrine (PROAMATINE) 5 MG tablet TAKE ONE TABLET BY MOUTH THREE TIMES A DAY 8/15/22  Yes PIERO Hutchinson   oxybutynin (DITROPAN XL) 10 MG extended release tablet Take 1 tablet by mouth in the morning. 8/15/22  Yes Dora Ribera MD   metoprolol tartrate (LOPRESSOR) 25 MG tablet Take 2 tablets by mouth in the morning and 2 tablets before bedtime. 8/5/22  Yes PIERO Hutchinson   pantoprazole (PROTONIX) 40 MG tablet TAKE ONE TABLET BY MOUTH TWICE A DAY 8/5/22  Yes PIERO Hutchinson   simvastatin (ZOCOR) 20 MG tablet TAKE ONE TABLET BY MOUTH ONCE NIGHTLY 8/5/22  Yes PIERO Hutchinson   busPIRone (BUSPAR) 5 MG tablet TAKE ONE TABLET BY MOUTH TWICE A DAY 8/5/22  Yes PIERO Hutchinson   citalopram (CELEXA) 40 MG tablet Take 1 tablet by mouth in the morning. 8/5/22  Yes PIERO Hutchinson   traZODone (DESYREL) 50 MG tablet Take 1 tablet by mouth nightly as needed for Sleep 8/5/22  Yes Talita Hutchinson   levocetirizine Von Amend ALLERGY 24HR) 5 MG tablet Take 1 tablet by mouth nightly 8/5/22  Yes PIERO Hutchinson   ALPRAZolam Valentina Buggy) 0.25 MG tablet Take 1 tablet by mouth 3 times daily as needed for Anxiety for up to 30 days.  8/5/22 9/4/22 Yes PIERO Hutchinson   montelukast (SINGULAIR) 10 MG tablet TAKE ONE TABLET BY MOUTH EVERY EVENING 7/5/22  Yes Talita Hutchinson   budesonide-formoterol [facility]) 160-4.5 MCG/ACT AERO Inhale 1 puff into the lungs 2 times daily 4/11/22  Yes Talita Hutchinson   Spacer/Aero-Holding Chambers MARIA DE JESUS 1 Device by Does not apply route as needed (To be used with albuterol inhaler) 12/13/21  Yes OSEI Gilmore - CNP   fludrocortisone (FLORINEF) 0.1 MG tablet Take 1 tablet by mouth daily 9/15/21  Yes PIERO Gavin   vitamin D (CHOLECALCIFEROL) 1000 UNIT TABS tablet Take 2 tablets by mouth daily. gummies   Yes Historical Provider, MD   Multiple Vitamins-Minerals (MULTIVITAMIN PO) Take 1 tablet by mouth daily. Yes Historical Provider, MD   BLACK COHOSH ROOT Take 600 mg by mouth 2 times daily as needed.  FOR HOT FLASHES   Yes Historical Provider, MD   ipratropium-albuterol (DUONEB) 0.5-2.5 (3) MG/3ML SOLN nebulizer solution Inhale 3 mLs into the lungs every 4 hours  Patient not taking: Reported on 8/31/2022 8/5/22   PIERO Gavin   Nebulizers (COMPRESSOR/NEBULIZER) MISC With tubing and supplies  Patient not taking: Reported on 8/31/2022 8/5/22   PIERO Gavin   glimepiride (AMARYL) 2 MG tablet Take 1 tablet by mouth every morning for 4 days  Patient not taking: Reported on 8/31/2022 7/26/22 7/30/22  Jose Huerta MD   flunisolide (NASALIDE) 25 MCG/ACT (0.025%) SOLN 2 sprays into both nostrils once a day  Patient not taking: Reported on 8/31/2022 5/5/22   Denise Coppola MD   nicotine (NICODERM CQ) 21 MG/24HR Place 1 patch onto the skin every 24 hours 4/11/22 5/26/22  PIERO Gavin   albuterol sulfate HFA (PROVENTIL HFA) 108 (90 Base) MCG/ACT inhaler Inhale 2 puffs into the lungs every 4 hours as needed for Wheezing  Patient not taking: Reported on 8/31/2022 4/14/21   OSEI Medina - CNP   diphenhydrAMINE (BENADRYL) 25 MG tablet Take 25 mg by mouth nightly as needed for Itching  Patient not taking: Reported on 8/31/2022    Historical Provider, MD              REVIEW OF SYSTEMS:    CONSTITUTIONAL:  negative for  fevers, chills, sweats, fatigue, malaise, anorexia and weight loss  EYES:  negative for  double vision, blurred vision, dry eyes, eye discharge and redness  HEENT:  negative for  hearing loss, tinnitus, ear drainage, earaches and nasal congestion  RESPIRATORY:  See hpi  CARDIOVASCULAR:  negative for  chest pain,, palpitations, orthopnea, PND  GASTROINTESTINAL: negative for nausea, vomiting, change in bowel habits, diarrhea, constipation, abdominal pain, pruritus, abdominal mass and abdominal distention    HEMATOLOGIC/LYMPHATIC:  negative for easy bruising, bleeding, lymphadenopathy, petechiae and swelling/edema  ALLERGIC/IMMUNOLOGIC:  negative for recurrent infections, urticaria and drug reactions  ENDOCRINE:  negative for heat intolerance, cold intolerance, tremor, weight changes and change in bowel habits  MUSCULOSKELETAL:  negative for  myalgias, arthralgias, pain, joint swelling, stiff joints and decreased range of motion  NEUROLOGICAL:  negative for headaches, dizziness, seizures, memory problems, speech problems, visual disturbance and coordination problems  BEHAVIOR/PSYCH:  negative for poor appetite, increased appetite, decreased sleep, increased sleep, decreased energy level, increased energy level and poor concentration  Skin no rash no dermatitis  Vitals:  /82   Pulse (!) 112   Temp (!) 96.4 °F (35.8 °C)   Ht 5' 1.5\" (1.562 m)   Wt 142 lb 6.4 oz (64.6 kg)   LMP 08/01/2006   SpO2 95%   BMI 26.47 kg/m²     PHYSICAL EXAM:  General Appearance:    Alert, cooperative, no distress, appears stated age   Head:    Normocephalic, without obvious abnormality, atraumatic      Eyes:    PERRL, conjunctiva/corneas clear, EOM's intact   Ears:    Normal  external ear canals, both ears   Nose:   Nares normal, septum midline, mucosa normal, no drainage        or sinus tenderness   Throat:   Lips, mucosa, and tongue normal; teeth and gums normal   Neck:   Supple, symmetrical, trachea midline, no adenopathy;     thyroid:  no enlargement/tenderness/nodules; no carotid    bruit , no JVD   Back:     Symmetric, no curvature, ROM normal, no CVA tenderness   Lungs:   Decreased breath sounds posterior base but no rales or rhonchi appreciated no wheezing no dullness   Chest Wall:    No tenderness or deformity      Heart:    Regular rate and rhythm, S1 and S2 normal, no murmur, rub        or gallop no rvh                           Abdomen:                                                 Pulses:                              Skin:                  Lymph nodes:                    Neurologic:                  Soft, non-tender, bowel sounds active all four quadrants,     no masses, no organomegaly         2+ and symmetric all extremities     Skin color, texture, turgor normal, no rashes or lesions       Cervical, supraclavicular not enlarged or matted or tender      CNII-XII intact, normal strength 5/5 . Sensation grossly normal  and reflexes normal 2+  throughout     Clubbing No  Lower ext edema No  Upper ext edema No               7/25/2022 6:17 am       COMPARISON:   07/23/2022       HISTORY:   ORDERING SYSTEM PROVIDED HISTORY: COPD exacerbation, wheezing   TECHNOLOGIST PROVIDED HISTORY:   COPD exacerbation, wheezing   Reason for Exam: COPD       FINDINGS:   Cardiomediastinal silhouette and pulmonary vasculature are within normal   limits. Perihilar densities appear slightly improved. There is mild   scattered interstitial prominence. No new airspace consolidation. No   evidence of pneumothorax or pleural effusion. Impression   Slightly improved perihilar densities. Mild scattered interstitial   prominence. IMPRESSION:     Diagnosis Orders   1. Mixed restrictive and obstructive lung disease ([address]  NERI Screen with Reflex    Anti-Neutrophilic Cytoplasmic Antibody    Rheumatoid Factor    Full PFT Study With Bronchodilator    Alpha-1-Antitrypsin w Phenotype      2. Smoker        3. Chronic obstructive pulmonary disease, unspecified COPD type ([address]  Full PFT Study With Bronchodilator    XR CHEST (2 VW)    Alpha-1-Antitrypsin w Phenotype      4. Pulmonary infiltrate on chest x-ray  XR CHEST (2 VW)           :                PLAN:      Mixed obstructive and restrictive ventilatory dysfunction of severe nature . Combination of copd from smoking and rule out underlying ILD . Will repeat xray chest to access resolution of pulmonary infiltrates , if present then HRCT chest .   Serology for evaluation of auto immune disease . Smoking cessation   Continue Symbicort and use prn albuterol - spacer   Can add LAMA if needed   Follow up 3 months with PFT   Will review xray chest and labs results and arrange earlier appointment if needed . Requested Prescriptions      No prescriptions requested or ordered in this encounter       There are no discontinued medications. Mercedes Martel received counseling on the following healthy behaviors: nutrition, exercise and medication adherence    Patient given educational materials : see patient instruction       Discussed use, benefit, and side effects of prescribed medications. Barriers to medication compliance addressed. All patient questions answered. Pt voiced understanding. I hope this updates you on my evaluation and clinical thinking. Thank you for allowing me to participate in his care. Sincerely,      Electronically signed by Alonso Collins MD on   8/31/22 at 9:15 AM EDT       Please note that this chart was generated using voice recognition Dragon dictation software. Although every effort was made to ensure the accuracy of this automated transcription, some errors in transcription may have occurred.

## 2022-09-16 ENCOUNTER — HOSPITAL ENCOUNTER (OUTPATIENT)
Dept: LAB | Age: 42
Discharge: HOME | End: 2022-09-16
Payer: COMMERCIAL

## 2022-09-16 ENCOUNTER — HOSPITAL ENCOUNTER (OUTPATIENT)
Dept: GENERAL RADIOLOGY | Age: 42
Discharge: HOME | End: 2022-09-18
Payer: COMMERCIAL

## 2022-09-16 DIAGNOSIS — J44.9 CHRONIC OBSTRUCTIVE PULMONARY DISEASE, UNSPECIFIED COPD TYPE (HCC): ICD-10-CM

## 2022-09-16 DIAGNOSIS — J43.9 MIXED RESTRICTIVE AND OBSTRUCTIVE LUNG DISEASE (HCC): ICD-10-CM

## 2022-09-16 DIAGNOSIS — J98.4 MIXED RESTRICTIVE AND OBSTRUCTIVE LUNG DISEASE (HCC): ICD-10-CM

## 2022-09-16 DIAGNOSIS — R91.8 PULMONARY INFILTRATE ON CHEST X-RAY: ICD-10-CM

## 2022-09-16 LAB — RHEUMATOID FACTOR: <10 IU/ML

## 2022-09-16 PROCEDURE — 71046 X-RAY EXAM CHEST 2 VIEWS: CPT

## 2022-09-16 PROCEDURE — 82104 ALPHA-1-ANTITRYPSIN PHENO: CPT

## 2022-09-16 PROCEDURE — 83516 IMMUNOASSAY NONANTIBODY: CPT

## 2022-09-16 PROCEDURE — 86225 DNA ANTIBODY NATIVE: CPT

## 2022-09-16 PROCEDURE — 86038 ANTINUCLEAR ANTIBODIES: CPT

## 2022-09-16 PROCEDURE — 82103 ALPHA-1-ANTITRYPSIN TOTAL: CPT

## 2022-09-16 PROCEDURE — 86431 RHEUMATOID FACTOR QUANT: CPT

## 2022-09-16 PROCEDURE — 36415 COLL VENOUS BLD VENIPUNCTURE: CPT

## 2022-09-19 LAB
ANCA MYELOPEROXIDASE: <0.3 AU/ML (ref 0–3.5)
ANCA PROTEINASE 3: <0.7 AU/ML (ref 0–2)
ANTI DNA DOUBLE STRANDED: 0.7 IU/ML
ANTI-NUCLEAR ANTIBODY (ANA): NEGATIVE
ENA ANTIBODIES SCREEN: 0.1 U/ML

## 2022-09-22 LAB
ALPHA-1 ANTITRYPSIN PHENOTYPE: NORMAL
ALPHA-1 ANTITRYPSIN: 145 MG/DL (ref 90–200)

## 2022-11-17 DIAGNOSIS — J32.4 CHRONIC PANSINUSITIS: ICD-10-CM

## 2022-11-17 RX ORDER — MONTELUKAST SODIUM 10 MG/1
TABLET ORAL
Qty: 90 TABLET | Refills: 0 | Status: SHIPPED | OUTPATIENT
Start: 2022-11-17

## 2022-11-17 NOTE — TELEPHONE ENCOUNTER
Last Appt:  8/5/2022  Next Appt:   12/6/2022  Med verified in [facility] [address]

## 2023-01-24 ENCOUNTER — OFFICE VISIT (OUTPATIENT)
Dept: PRIMARY CARE CLINIC | Age: 43
End: 2023-01-24
Payer: COMMERCIAL

## 2023-01-24 ENCOUNTER — HOSPITAL ENCOUNTER (OUTPATIENT)
Age: 43
Discharge: HOME | End: 2023-01-26
Payer: COMMERCIAL

## 2023-01-24 ENCOUNTER — HOSPITAL ENCOUNTER (OUTPATIENT)
Dept: GENERAL RADIOLOGY | Age: 43
Discharge: HOME | End: 2023-01-26
Payer: COMMERCIAL

## 2023-01-24 VITALS
DIASTOLIC BLOOD PRESSURE: 74 MMHG | HEART RATE: 74 BPM | BODY MASS INDEX: 26.02 KG/M2 | TEMPERATURE: 98.3 F | SYSTOLIC BLOOD PRESSURE: 112 MMHG | OXYGEN SATURATION: 98 % | WEIGHT: 140 LBS

## 2023-01-24 DIAGNOSIS — S92.415A NONDISPLACED FRACTURE OF PROXIMAL PHALANX OF LEFT GREAT TOE, INITIAL ENCOUNTER FOR CLOSED FRACTURE: Primary | ICD-10-CM

## 2023-01-24 DIAGNOSIS — R52 PAIN: ICD-10-CM

## 2023-01-24 PROCEDURE — 99213 OFFICE O/P EST LOW 20 MIN: CPT | Performed by: NURSE PRACTITIONER

## 2023-01-24 PROCEDURE — L3260 AMBULATORY SURGICAL BOOT EAC: HCPCS | Performed by: NURSE PRACTITIONER

## 2023-01-24 PROCEDURE — 73630 X-RAY EXAM OF FOOT: CPT

## 2023-01-24 ASSESSMENT — PATIENT HEALTH QUESTIONNAIRE - PHQ9
1. LITTLE INTEREST OR PLEASURE IN DOING THINGS: 0
SUM OF ALL RESPONSES TO PHQ QUESTIONS 1-9: 0
SUM OF ALL RESPONSES TO PHQ QUESTIONS 1-9: 0
2. FEELING DOWN, DEPRESSED OR HOPELESS: 0
SUM OF ALL RESPONSES TO PHQ QUESTIONS 1-9: 0
SUM OF ALL RESPONSES TO PHQ9 QUESTIONS 1 & 2: 0
SUM OF ALL RESPONSES TO PHQ QUESTIONS 1-9: 0

## 2023-01-24 NOTE — LETTER
[address] [facility] A department of [place] [address]  Phone: [phone]  Fax: [phone]    OSEI Tomlin CNP        January 24, 2023     Patient: Tali Chicas   YOB: 1980   Date of Visit: 1/24/2023       To Whom It May Concern: It is my medical opinion that Mehreen Olivas may be excused from work today 1/24/23. .    If you have any questions or concerns, please don't hesitate to call.     Sincerely,        OSEI Tomlin CNP

## 2023-01-25 NOTE — PROGRESS NOTES
Subjective:      Patient ID: Tanesha Landaverde is a 38 y.o. female coming in for   Chief Complaint   Patient presents with    Foot Pain     Ankle pain left side slipped on ice this am         HPI  Slipped on ice this morning. Pain, swelling and bruising to left great toe. Has been taking motrin with some relief. No deformity to toe. Denies any other injuries acquired during fall. Review of Systems     Objective:/74 (Site: Left Upper Arm, Position: Sitting, Cuff Size: Large Adult)   Pulse 74   Temp 98.3 °F (36.8 °C) (Tympanic)   Wt 140 lb (63.5 kg)   LMP 08/01/2006   SpO2 98%   BMI 26.02 kg/m²      Physical Exam  Vitals and nursing note reviewed. Constitutional:       General: She is not in acute distress. Appearance: Normal appearance. Cardiovascular:      Pulses:           Dorsalis pedis pulses are 2+ on the left side. Posterior tibial pulses are 2+ on the left side. Pulmonary:      Effort: Pulmonary effort is normal.   Musculoskeletal:        Feet:    Neurological:      General: No focal deficit present. Mental Status: She is alert and oriented to person, place, and time. Assessment:      1. Nondisplaced fracture of proximal phalanx of left great toe, initial encounter for closed fracture    2. Pain           Plan:    -fracture of left great toe  -discussed nicholas taping  -post op shoe while not at work for support  -tylenol/motrin    Orders Placed This Encounter   Procedures    XR FOOT LEFT (MIN 3 VIEWS)     Standing Status:   Future     Number of Occurrences:   1     Standing Expiration Date:   1/24/2024     Order Specific Question:   Reason for exam:     Answer:   fell on ice    PROCARE SQUARE TOE POST-OP SHOE     Patient was prescribed a PROCARE SQUARE TOE POST-OP SHOE. The left foot will require stabilization / immobilization from this semi-rigid / rigid orthosis to improve their function.   The orthosis will assist in protecting the affected area, provide functional support and facilitate healing.    The patient was educated and fit by a healthcare professional with expert knowledge and specialization in brace application.  Verbal and written instructions for the use of and application of this item were provided.  They were instructed to contact the office immediately should the brace result in increased pain, decreased sensation, increased swelling or worsening of the condition.      Outpatient Encounter Medications as of 1/24/2023   Medication Sig Dispense Refill    montelukast (SINGULAIR) 10 MG tablet TAKE ONE TABLET BY MOUTH EVERY EVENING 90 tablet 0    midodrine (PROAMATINE) 5 MG tablet TAKE ONE TABLET BY MOUTH THREE TIMES A  tablet 1    oxybutynin (DITROPAN XL) 10 MG extended release tablet Take 1 tablet by mouth in the morning. 90 tablet 3    metoprolol tartrate (LOPRESSOR) 25 MG tablet Take 2 tablets by mouth in the morning and 2 tablets before bedtime. 360 tablet 1    pantoprazole (PROTONIX) 40 MG tablet TAKE ONE TABLET BY MOUTH TWICE A  tablet 1    simvastatin (ZOCOR) 20 MG tablet TAKE ONE TABLET BY MOUTH ONCE NIGHTLY 90 tablet 1    busPIRone (BUSPAR) 5 MG tablet TAKE ONE TABLET BY MOUTH TWICE A  tablet 1    citalopram (CELEXA) 40 MG tablet Take 1 tablet by mouth in the morning. 90 tablet 1    traZODone (DESYREL) 50 MG tablet Take 1 tablet by mouth nightly as needed for Sleep 90 tablet 1    ipratropium-albuterol (DUONEB) 0.5-2.5 (3) MG/3ML SOLN nebulizer solution Inhale 3 mLs into the lungs every 4 hours 360 mL 2    Nebulizers (COMPRESSOR/NEBULIZER) MISC With tubing and supplies 1 each 3    levocetirizine (XYZAL ALLERGY 24HR) 5 MG tablet Take 1 tablet by mouth nightly 90 tablet 1    flunisolide (NASALIDE) 25 MCG/ACT (0.025%) SOLN 2 sprays into both nostrils once a day 1 each 1    budesonide-formoterol (SYMBICORT) 160-4.5 MCG/ACT AERO Inhale 1 puff into the lungs 2 times daily 10.2 g 3    Spacer/Aero-Holding Chambers MARIA DE JESUS 1 Device by  Does not apply route as needed (To be used with albuterol inhaler) 1 each 0    fludrocortisone (FLORINEF) 0.1 MG tablet Take 1 tablet by mouth daily 90 tablet 1    albuterol sulfate HFA (PROVENTIL HFA) 108 (90 Base) MCG/ACT inhaler Inhale 2 puffs into the lungs every 4 hours as needed for Wheezing 1 Inhaler 0    diphenhydrAMINE (BENADRYL) 25 MG tablet Take 25 mg by mouth nightly as needed for Itching      vitamin D (CHOLECALCIFEROL) 1000 UNIT TABS tablet Take 2 tablets by mouth daily. gummies      Multiple Vitamins-Minerals (MULTIVITAMIN PO) Take 1 tablet by mouth daily. BLACK COHOSH ROOT Take 600 mg by mouth 2 times daily as needed. FOR HOT FLASHES      glimepiride (AMARYL) 2 MG tablet Take 1 tablet by mouth every morning for 4 days (Patient not taking: Reported on 8/31/2022) 4 tablet 3    nicotine (NICODERM CQ) 21 MG/24HR Place 1 patch onto the skin every 24 hours 45 patch 0     No facility-administered encounter medications on file as of 1/24/2023.             Dao Jordan, APRN - CNP

## 2023-01-26 NOTE — ED NOTES
Report to [facility].  Aware starting Rocephin will need Zithromax yet     Renu Waller, RN  07/22/22 [address] Roxana Pérez RN  07/22/22 2064 The day before the colonoscopy, hold the jardiance and only take the metformin  Hold both jardiance and metformin the day of the colonoscopy until after th procedure   The day of the colonoscopy, he should take double his dose of hydrocortisone in the am and int he pm  DISCHARGE

## 2023-02-23 DIAGNOSIS — F41.8 ANXIETY WITH DEPRESSION: ICD-10-CM

## 2023-02-23 DIAGNOSIS — J32.4 CHRONIC PANSINUSITIS: ICD-10-CM

## 2023-02-24 RX ORDER — CITALOPRAM 40 MG/1
TABLET ORAL
Qty: 90 TABLET | Refills: 0 | Status: SHIPPED | OUTPATIENT
Start: 2023-02-24

## 2023-02-24 RX ORDER — MONTELUKAST SODIUM 10 MG/1
TABLET ORAL
Qty: 90 TABLET | Refills: 0 | Status: SHIPPED | OUTPATIENT
Start: 2023-02-24

## 2023-02-24 NOTE — TELEPHONE ENCOUNTER
Last Appt:  8/5/2022  Next Appt:   3/6/2023  Med verified in [facility] [address]

## 2023-02-28 DIAGNOSIS — F41.8 ANXIETY WITH DEPRESSION: ICD-10-CM

## 2023-02-28 DIAGNOSIS — K21.9 GASTROESOPHAGEAL REFLUX DISEASE, UNSPECIFIED WHETHER ESOPHAGITIS PRESENT: ICD-10-CM

## 2023-03-01 RX ORDER — BUSPIRONE HYDROCHLORIDE 5 MG/1
TABLET ORAL
Qty: 180 TABLET | Refills: 0 | Status: SHIPPED | OUTPATIENT
Start: 2023-03-01

## 2023-03-01 RX ORDER — PANTOPRAZOLE SODIUM 40 MG/1
TABLET, DELAYED RELEASE ORAL
Qty: 180 TABLET | Refills: 0 | Status: SHIPPED | OUTPATIENT
Start: 2023-03-01

## 2023-03-01 NOTE — TELEPHONE ENCOUNTER
Last Appt:  8/5/2022  Next Appt:   3/6/2023  Med verified in [facility] [address]

## 2023-03-06 ENCOUNTER — OFFICE VISIT (OUTPATIENT)
Dept: FAMILY MEDICINE CLINIC | Age: 43
End: 2023-03-06
Payer: COMMERCIAL

## 2023-03-06 VITALS
OXYGEN SATURATION: 96 % | HEART RATE: 72 BPM | BODY MASS INDEX: 26.28 KG/M2 | HEIGHT: 62 IN | WEIGHT: 142.8 LBS | SYSTOLIC BLOOD PRESSURE: 122 MMHG | DIASTOLIC BLOOD PRESSURE: 76 MMHG

## 2023-03-06 DIAGNOSIS — R55 NEUROCARDIOGENIC SYNCOPE: ICD-10-CM

## 2023-03-06 DIAGNOSIS — K21.9 GASTROESOPHAGEAL REFLUX DISEASE, UNSPECIFIED WHETHER ESOPHAGITIS PRESENT: ICD-10-CM

## 2023-03-06 DIAGNOSIS — E78.2 MIXED HYPERLIPIDEMIA: ICD-10-CM

## 2023-03-06 DIAGNOSIS — F41.8 ANXIETY WITH DEPRESSION: Primary | ICD-10-CM

## 2023-03-06 DIAGNOSIS — R73.01 IFG (IMPAIRED FASTING GLUCOSE): ICD-10-CM

## 2023-03-06 DIAGNOSIS — G47.00 INSOMNIA, UNSPECIFIED TYPE: ICD-10-CM

## 2023-03-06 DIAGNOSIS — R39.9 LOWER URINARY TRACT SYMPTOMS: ICD-10-CM

## 2023-03-06 PROCEDURE — 99214 OFFICE O/P EST MOD 30 MIN: CPT | Performed by: PHYSICIAN ASSISTANT

## 2023-03-06 RX ORDER — CITALOPRAM 40 MG/1
TABLET ORAL
Qty: 90 TABLET | Refills: 1 | Status: SHIPPED | OUTPATIENT
Start: 2023-03-06

## 2023-03-06 RX ORDER — TRAZODONE HYDROCHLORIDE 50 MG/1
50 TABLET ORAL NIGHTLY PRN
Qty: 90 TABLET | Refills: 1 | Status: SHIPPED | OUTPATIENT
Start: 2023-03-06

## 2023-03-06 RX ORDER — MIDODRINE HYDROCHLORIDE 5 MG/1
TABLET ORAL
Qty: 270 TABLET | Refills: 1 | Status: SHIPPED | OUTPATIENT
Start: 2023-03-06

## 2023-03-06 RX ORDER — OXYBUTYNIN CHLORIDE 10 MG/1
10 TABLET, EXTENDED RELEASE ORAL DAILY
Qty: 90 TABLET | Refills: 3 | Status: SHIPPED | OUTPATIENT
Start: 2023-03-06

## 2023-03-06 RX ORDER — BUSPIRONE HYDROCHLORIDE 10 MG/1
TABLET ORAL
Qty: 180 TABLET | Refills: 1 | Status: SHIPPED | OUTPATIENT
Start: 2023-03-06

## 2023-03-06 RX ORDER — PANTOPRAZOLE SODIUM 40 MG/1
TABLET, DELAYED RELEASE ORAL
Qty: 180 TABLET | Refills: 1 | Status: SHIPPED
Start: 2023-03-06 | End: 2023-03-09 | Stop reason: CLARIF

## 2023-03-06 SDOH — ECONOMIC STABILITY: FOOD INSECURITY: WITHIN THE PAST 12 MONTHS, THE FOOD YOU BOUGHT JUST DIDN'T LAST AND YOU DIDN'T HAVE MONEY TO GET MORE.: NEVER TRUE

## 2023-03-06 SDOH — ECONOMIC STABILITY: FOOD INSECURITY: WITHIN THE PAST 12 MONTHS, YOU WORRIED THAT YOUR FOOD WOULD RUN OUT BEFORE YOU GOT MONEY TO BUY MORE.: NEVER TRUE

## 2023-03-06 SDOH — ECONOMIC STABILITY: HOUSING INSECURITY
IN THE LAST 12 MONTHS, WAS THERE A TIME WHEN YOU DID NOT HAVE A STEADY PLACE TO SLEEP OR SLEPT IN A SHELTER (INCLUDING NOW)?: NO

## 2023-03-06 SDOH — ECONOMIC STABILITY: INCOME INSECURITY: HOW HARD IS IT FOR YOU TO PAY FOR THE VERY BASICS LIKE FOOD, HOUSING, MEDICAL CARE, AND HEATING?: VERY HARD

## 2023-03-07 ASSESSMENT — ENCOUNTER SYMPTOMS
RESPIRATORY NEGATIVE: 1
HEARTBURN: 1
ABDOMINAL PAIN: 0

## 2023-03-08 ENCOUNTER — TELEPHONE (OUTPATIENT)
Dept: FAMILY MEDICINE CLINIC | Age: 43
End: 2023-03-08

## 2023-03-08 NOTE — PROGRESS NOTES
Subjective:      Patient ID: Leopold Luster is a 38 y.o. female. Mental Health Problem  The primary symptoms include dysphoric mood. The current episode started more than 1 month ago. This is a chronic problem. The onset of the illness is precipitated by emotional stress. The degree of incapacity that she is experiencing as a consequence of her illness is moderate. Additional symptoms of the illness include insomnia and agitation. Additional symptoms of the illness do not include anhedonia, fatigue, feelings of worthlessness or abdominal pain. She does not admit to suicidal ideas. Risk factors that are present for mental illness include a history of mental illness. Gastroesophageal Reflux  She complains of heartburn. She reports no abdominal pain. This is a chronic problem. The current episode started more than 1 year ago. Pertinent negatives include no fatigue. She has tried a PPI for the symptoms. The treatment provided significant relief. Hyperlipidemia  This is a chronic problem. The current episode started more than 1 year ago. The problem is controlled. She has no history of chronic renal disease. Current antihyperlipidemic treatment includes statins. The current treatment provides significant improvement of lipids. There are no compliance problems. Diabetes  She presents for her follow-up diabetic visit. Diabetes type: IFG. Hypoglycemia symptoms include dizziness. Pertinent negatives for diabetes include no fatigue, no polydipsia, no polyphagia and no polyuria. Current diabetic treatment includes diet. Dizziness  This is a chronic problem. The current episode started more than 1 year ago. Pertinent negatives include no abdominal pain, anorexia or fatigue. Review of Systems   Constitutional:  Negative for fatigue. HENT: Negative. Respiratory: Negative. Cardiovascular: Negative. Gastrointestinal:  Positive for heartburn. Negative for abdominal pain and anorexia.    Endocrine: Negative for polydipsia, polyphagia and polyuria. Neurological:  Positive for dizziness. Psychiatric/Behavioral:  Positive for agitation and dysphoric mood. The patient has insomnia. Past Medical History:   Diagnosis Date    Atrophic vaginitis 03/10/2016    Chronic fatigue     Constipation     Endometriosis     h/o endometriosis for which she had a hysterectomy    Genital herpes     Hyperlipidemia     Migraine     migraine cephalgia    Neurocardiogenic syncope     Ovarian cyst     Pelvic inflammatory disease (PID)     Pneumonia due to infectious organism     Sinusitis, chronic     chronic sinusitis probably related to tobacco abuse    Tobacco abuse     Vitamin D deficiency      Past Surgical History:   Procedure Laterality Date    APPENDECTOMY      CATARACT REMOVAL WITH IMPLANT Bilateral 01/23/2002    LAPAROSCOPY      X3 before the hysterectomy    OTHER SURGICAL HISTORY  2016    lump removed lower right leg    KRISTIN AND BSO (CERVIX REMOVED)  2006    total with bilateral salpingo-oophorectomy    WISDOM TOOTH EXTRACTION       Social History     Tobacco Use    Smoking status: Every Day     Packs/day: 1.00     Years: 15.00     Pack years: 15.00     Types: Cigarettes     Start date: 1/1/1998    Smokeless tobacco: Never    Tobacco comments:     3 cigarettes in last 3 weeks 5/17/2022   Vaping Use    Vaping Use: Never used   Substance Use Topics    Alcohol use: Yes     Alcohol/week: 0.0 standard drinks     Comment: RARE    Drug use: No     Types: Cocaine     Comment: HAS BEEN OFF CRACK COCAINE FOR SEVERAL YEARS       Objective:   Physical Exam  HENT:      Head: Normocephalic. Right Ear: Tympanic membrane normal.      Left Ear: Tympanic membrane normal.      Nose: Nose normal.      Mouth/Throat:      Mouth: Mucous membranes are moist.   Eyes:      Pupils: Pupils are equal, round, and reactive to light. Cardiovascular:      Rate and Rhythm: Normal rate.    Pulmonary:      Effort: Pulmonary effort is normal. Breath sounds: Normal breath sounds. Skin:     General: Skin is warm. Neurological:      General: No focal deficit present. Mental Status: She is alert and oriented to person, place, and time. Psychiatric:         Attention and Perception: Attention normal.         Mood and Affect: Mood is anxious. Assessment:      1. Anxiety with depression    2. Gastroesophageal reflux disease, unspecified whether esophagitis present    3. Insomnia, unspecified type    4. Mixed hyperlipidemia    5. IFG (impaired fasting glucose)    6. Neurocardiogenic syncope    7. Lower urinary tract symptoms           Plan:      Interval history reviewed with patient. Increase Buspar 10 mg bid. Coping strategies discussed. Healthy diet and routine exercise. Tobacco cessation discussed and advised. Continue chronic medications. Update laboratory studies. Follow-up in six months/sooner PRN.         Curlee Harada, PA

## 2023-03-09 RX ORDER — OMEPRAZOLE 40 MG/1
40 CAPSULE, DELAYED RELEASE ORAL 2 TIMES DAILY
Qty: 180 CAPSULE | Refills: 1 | Status: SHIPPED | OUTPATIENT
Start: 2023-03-09

## 2023-03-09 NOTE — TELEPHONE ENCOUNTER
Pt notified of denial and states she will try the Omeprazole, pt uses [facility], any questions you can call pt back.

## 2023-03-20 ENCOUNTER — OFFICE VISIT (OUTPATIENT)
Dept: PRIMARY CARE CLINIC | Age: 43
End: 2023-03-20
Payer: COMMERCIAL

## 2023-03-20 VITALS
RESPIRATION RATE: 16 BRPM | DIASTOLIC BLOOD PRESSURE: 62 MMHG | HEIGHT: 62 IN | BODY MASS INDEX: 25.95 KG/M2 | HEART RATE: 61 BPM | OXYGEN SATURATION: 96 % | WEIGHT: 141 LBS | SYSTOLIC BLOOD PRESSURE: 100 MMHG | TEMPERATURE: 97.5 F

## 2023-03-20 DIAGNOSIS — J34.89 SINUS PRESSURE: ICD-10-CM

## 2023-03-20 DIAGNOSIS — J40 BRONCHITIS: ICD-10-CM

## 2023-03-20 DIAGNOSIS — H66.002 NON-RECURRENT ACUTE SUPPURATIVE OTITIS MEDIA OF LEFT EAR WITHOUT SPONTANEOUS RUPTURE OF TYMPANIC MEMBRANE: Primary | ICD-10-CM

## 2023-03-20 DIAGNOSIS — R52 BODY ACHES: ICD-10-CM

## 2023-03-20 DIAGNOSIS — R05.9 COUGH, UNSPECIFIED TYPE: ICD-10-CM

## 2023-03-20 LAB
INFLUENZA A ANTIGEN, POC: NEGATIVE
INFLUENZA B ANTIGEN, POC: NEGATIVE
LOT EXPIRE DATE: NORMAL
LOT KIT NUMBER: NORMAL
SARS-COV-2, POC: NORMAL
VALID INTERNAL CONTROL: NORMAL
VENDOR AND KIT NAME POC: NORMAL

## 2023-03-20 PROCEDURE — 87428 SARSCOV & INF VIR A&B AG IA: CPT

## 2023-03-20 PROCEDURE — 99213 OFFICE O/P EST LOW 20 MIN: CPT

## 2023-03-20 RX ORDER — BENZONATATE 100 MG/1
100 CAPSULE ORAL 3 TIMES DAILY PRN
Qty: 21 CAPSULE | Refills: 0 | Status: SHIPPED | OUTPATIENT
Start: 2023-03-20 | End: 2023-03-20

## 2023-03-20 RX ORDER — ALBUTEROL SULFATE 90 UG/1
2 AEROSOL, METERED RESPIRATORY (INHALATION) 4 TIMES DAILY PRN
Qty: 18 G | Refills: 0 | Status: SHIPPED | OUTPATIENT
Start: 2023-03-20

## 2023-03-20 RX ORDER — AZITHROMYCIN 250 MG/1
250 TABLET, FILM COATED ORAL SEE ADMIN INSTRUCTIONS
Qty: 6 TABLET | Refills: 0 | Status: SHIPPED | OUTPATIENT
Start: 2023-03-20 | End: 2023-03-25

## 2023-03-20 RX ORDER — PREDNISONE 20 MG/1
20 TABLET ORAL 2 TIMES DAILY
Qty: 10 TABLET | Refills: 0 | Status: SHIPPED | OUTPATIENT
Start: 2023-03-20 | End: 2023-03-25

## 2023-03-20 RX ORDER — BENZONATATE 100 MG/1
100 CAPSULE ORAL 3 TIMES DAILY PRN
Qty: 21 CAPSULE | Refills: 0 | Status: SHIPPED | OUTPATIENT
Start: 2023-03-20 | End: 2023-03-27

## 2023-03-20 RX ORDER — PREDNISONE 20 MG/1
20 TABLET ORAL 2 TIMES DAILY
Qty: 10 TABLET | Refills: 0 | Status: SHIPPED | OUTPATIENT
Start: 2023-03-20 | End: 2023-03-20

## 2023-03-20 RX ORDER — AZITHROMYCIN 250 MG/1
250 TABLET, FILM COATED ORAL SEE ADMIN INSTRUCTIONS
Qty: 6 TABLET | Refills: 0 | Status: SHIPPED | OUTPATIENT
Start: 2023-03-20 | End: 2023-03-20

## 2023-03-20 ASSESSMENT — ENCOUNTER SYMPTOMS
SINUS PAIN: 1
VOMITING: 0
SHORTNESS OF BREATH: 1
SINUS PRESSURE: 1
RHINORRHEA: 0
DIARRHEA: 0
NAUSEA: 1
SORE THROAT: 1
COUGH: 1

## 2023-03-20 NOTE — LETTER
March 20, 2023       [address] [facility]       Ange Bautista was seen in urgent care on 3/20/2023. May return to work on 3/22/2023. If you have any questions or concerns, please don't hesitate to call.     Sincerely,        Leanne Lanza, OSEI - CNP

## 2023-03-20 NOTE — PROGRESS NOTES
azithromycin (ZITHROMAX) 250 MG tablet    DISCONTINUED: azithromycin (ZITHROMAX) 250 MG tablet      2. Bronchitis  albuterol sulfate HFA (VENTOLIN HFA) 108 (90 Base) MCG/ACT inhaler    albuterol sulfate HFA (VENTOLIN HFA) 108 (90 Base) MCG/ACT inhaler    azithromycin (ZITHROMAX) 250 MG tablet    predniSONE (DELTASONE) 20 MG tablet    benzonatate (TESSALON) 100 MG capsule    DISCONTINUED: predniSONE (DELTASONE) 20 MG tablet    DISCONTINUED: benzonatate (TESSALON) 100 MG capsule    DISCONTINUED: azithromycin (ZITHROMAX) 250 MG tablet      3. Body aches  POCT COVID-19 & Influenza A/B      4. Cough, unspecified type  POCT COVID-19 & Influenza A/B      5. Sinus pressure  POCT COVID-19 & Influenza A/B        Orders Placed This Encounter    POCT COVID-19 & Influenza A/B     Order Specific Question:   Pregnant? Answer:   No     Order Specific Question:   Previously tested for COVID-19? Answer:   Yes    albuterol sulfate HFA (VENTOLIN HFA) 108 (90 Base) MCG/ACT inhaler     Sig: Inhale 2 puffs into the lungs 4 times daily as needed for Wheezing     Dispense:  18 g     Refill:  0                                                                                azithromycin (ZITHROMAX) 250 MG tablet     Sig: Take 1 tablet by mouth See Admin Instructions for 5 days 500mg on day 1 followed by 250mg on days 2 - 5     Dispense:  6 tablet     Refill:  0    predniSONE (DELTASONE) 20 MG tablet     Sig: Take 1 tablet by mouth 2 times daily for 5 days     Dispense:  10 tablet     Refill:  0    benzonatate (TESSALON) 100 MG capsule     Sig: Take 1 capsule by mouth 3 times daily as needed for Cough     Dispense:  21 capsule     Refill:  0     Discussed negative covid/influenza in clinic with patient. Pleasant 38year old female presents with x1 week of cough, congestion, sinus pain/pressure, mid-sternal chest pain with cough and SOB. Denies fevers at home. Patient speaking in complete sentences.  VSS, lungs with wheeze to right

## 2023-05-21 DIAGNOSIS — J32.4 CHRONIC PANSINUSITIS: ICD-10-CM

## 2023-05-21 DIAGNOSIS — E78.2 MIXED HYPERLIPIDEMIA: ICD-10-CM

## 2023-05-22 RX ORDER — MONTELUKAST SODIUM 10 MG/1
TABLET ORAL
Qty: 90 TABLET | Refills: 0 | Status: SHIPPED | OUTPATIENT
Start: 2023-05-22

## 2023-05-22 RX ORDER — SIMVASTATIN 20 MG
TABLET ORAL
Qty: 90 TABLET | Refills: 1 | Status: SHIPPED | OUTPATIENT
Start: 2023-05-22

## 2023-06-19 ENCOUNTER — OFFICE VISIT (OUTPATIENT)
Dept: PRIMARY CARE CLINIC | Age: 43
End: 2023-06-19
Payer: COMMERCIAL

## 2023-06-19 VITALS
TEMPERATURE: 97.7 F | BODY MASS INDEX: 24.16 KG/M2 | HEIGHT: 65 IN | HEART RATE: 78 BPM | RESPIRATION RATE: 16 BRPM | SYSTOLIC BLOOD PRESSURE: 108 MMHG | DIASTOLIC BLOOD PRESSURE: 72 MMHG | WEIGHT: 145 LBS | OXYGEN SATURATION: 98 %

## 2023-06-19 DIAGNOSIS — J44.1 COPD EXACERBATION (HCC): Primary | ICD-10-CM

## 2023-06-19 DIAGNOSIS — R05.1 ACUTE COUGH: ICD-10-CM

## 2023-06-19 LAB
Lab: NORMAL
QC PASS/FAIL: NORMAL
SARS-COV-2 RDRP RESP QL NAA+PROBE: NEGATIVE

## 2023-06-19 PROCEDURE — 99214 OFFICE O/P EST MOD 30 MIN: CPT | Performed by: FAMILY MEDICINE

## 2023-06-19 PROCEDURE — 87635 SARS-COV-2 COVID-19 AMP PRB: CPT | Performed by: FAMILY MEDICINE

## 2023-06-19 RX ORDER — DOXYCYCLINE HYCLATE 100 MG
100 TABLET ORAL 2 TIMES DAILY
Qty: 20 TABLET | Refills: 0 | Status: SHIPPED | OUTPATIENT
Start: 2023-06-19

## 2023-06-19 ASSESSMENT — ENCOUNTER SYMPTOMS
SHORTNESS OF BREATH: 1
NAUSEA: 0
SORE THROAT: 1
CONSTIPATION: 0
SINUS PRESSURE: 1
CHEST TIGHTNESS: 0
WHEEZING: 0
COUGH: 1
CHOKING: 0
DIARRHEA: 0
TROUBLE SWALLOWING: 0

## 2023-06-19 NOTE — PROGRESS NOTES
[facility] [address] [place], [place] [address]  [address] [place] [address] [facility] [address]  [address] [place] [address]  Dept: [phone]  Dept Fax: [phone]    Alexandra Skinner is a 37 y.o. female who presents today for her medical conditions/complaints as noted below. Alexandra Skinner is c/o of   Chief Complaint   Patient presents with    URI     Nasal congestion, Sore throat, cough, sob, low grade fever, body aches. HPI:     Here today for a cough    Cough  This is a new problem. The current episode started in the past 7 days (5 days). The problem has been unchanged. The problem occurs every few minutes. The cough is Productive of sputum. Associated symptoms include chills, headaches, myalgias (mild), nasal congestion, postnasal drip, a sore throat, shortness of breath and sweats. Pertinent negatives include no chest pain, ear congestion, ear pain, fever, rash or wheezing. Associated symptoms comments: No loss of taste or smell. Nothing aggravates the symptoms. She has tried OTC cough suppressant and a beta-agonist inhaler (nasal saline) for the symptoms. The treatment provided no relief. There is no history of environmental allergies.        Past Medical History:   Diagnosis Date    Atrophic vaginitis 03/10/2016    Chronic fatigue     Constipation     Endometriosis     h/o endometriosis for which she had a hysterectomy    Genital herpes     Hyperlipidemia     Migraine     migraine cephalgia    Neurocardiogenic syncope     Ovarian cyst     Pelvic inflammatory disease (PID)     Pneumonia due to infectious organism     Sinusitis, chronic     chronic sinusitis probably related to tobacco abuse    Tobacco abuse     Vitamin D deficiency           Social History     Tobacco Use    Smoking status: Every Day     Packs/day: 1.00     Years: 15.00     Pack years: 15.00     Types: Cigarettes     Start date: 1/1/1998    Smokeless tobacco: Never

## 2023-07-16 ENCOUNTER — APPOINTMENT (OUTPATIENT)
Dept: GENERAL RADIOLOGY | Age: 43
DRG: 192 | End: 2023-07-16
Payer: COMMERCIAL

## 2023-07-16 ENCOUNTER — HOSPITAL ENCOUNTER (INPATIENT)
Age: 43
LOS: 3 days | Discharge: HOME | DRG: 192 | End: 2023-07-19
Attending: EMERGENCY MEDICINE | Admitting: FAMILY MEDICINE
Payer: COMMERCIAL

## 2023-07-16 DIAGNOSIS — J44.1 COPD EXACERBATION (HCC): Primary | ICD-10-CM

## 2023-07-16 PROBLEM — F41.8 MIXED ANXIETY DEPRESSIVE DISORDER: Status: ACTIVE | Noted: 2022-03-10

## 2023-07-16 PROBLEM — K21.9 GASTROESOPHAGEAL REFLUX DISEASE: Status: ACTIVE | Noted: 2022-03-10

## 2023-07-16 PROBLEM — S52.502A CLOSED FRACTURE OF DISTAL END OF LEFT RADIUS: Status: ACTIVE | Noted: 2022-04-14

## 2023-07-16 PROBLEM — G47.00 INSOMNIA: Status: ACTIVE | Noted: 2022-03-03

## 2023-07-16 LAB
ALBUMIN SERPL-MCNC: 4.7 G/DL (ref 3.5–5.2)
ALBUMIN/GLOB SERPL: 1.6 {RATIO} (ref 1–2.5)
ALP SERPL-CCNC: 111 U/L (ref 35–104)
ALT SERPL-CCNC: 12 U/L (ref 5–33)
ANION GAP SERPL CALCULATED.3IONS-SCNC: 15 MMOL/L (ref 9–17)
AST SERPL-CCNC: 16 U/L
BASOPHILS # BLD: 0.11 K/UL (ref 0–0.2)
BASOPHILS NFR BLD: 1 % (ref 0–2)
BILIRUB SERPL-MCNC: 0.5 MG/DL (ref 0.3–1.2)
BUN SERPL-MCNC: 12 MG/DL (ref 6–20)
BUN/CREAT SERPL: 13 (ref 9–20)
CALCIUM SERPL-MCNC: 9.5 MG/DL (ref 8.6–10.4)
CHLORIDE SERPL-SCNC: 102 MMOL/L (ref 98–107)
CO2 SERPL-SCNC: 22 MMOL/L (ref 20–31)
CREAT SERPL-MCNC: 0.9 MG/DL (ref 0.5–0.9)
D DIMER PPP FEU-MCNC: <0.27 UG/ML FEU (ref 0–0.59)
EKG ATRIAL RATE: 89 BPM
EKG P AXIS: 44 DEGREES
EKG P-R INTERVAL: 136 MS
EKG Q-T INTERVAL: 372 MS
EKG QRS DURATION: 72 MS
EKG QTC CALCULATION (BAZETT): 452 MS
EKG R AXIS: 59 DEGREES
EKG T AXIS: 52 DEGREES
EKG VENTRICULAR RATE: 89 BPM
EOSINOPHIL # BLD: 1.19 K/UL (ref 0–0.44)
EOSINOPHILS RELATIVE PERCENT: 9 % (ref 1–4)
ERYTHROCYTE [DISTWIDTH] IN BLOOD BY AUTOMATED COUNT: 13.2 % (ref 11.8–14.4)
GFR SERPL CREATININE-BSD FRML MDRD: >60 ML/MIN/1.73M2
GLUCOSE SERPL-MCNC: 87 MG/DL (ref 70–99)
HCT VFR BLD AUTO: 39.6 % (ref 36.3–47.1)
HGB BLD-MCNC: 13.1 G/DL (ref 11.9–15.1)
IMM GRANULOCYTES # BLD AUTO: 0.04 K/UL (ref 0–0.3)
IMM GRANULOCYTES NFR BLD: 0 %
LACTATE BLDV-SCNC: 0.9 MMOL/L (ref 0.5–1.9)
LYMPHOCYTES # BLD: 17 % (ref 24–43)
LYMPHOCYTES NFR BLD: 2.3 K/UL (ref 1.1–3.7)
MCH RBC QN AUTO: 30.3 PG (ref 25.2–33.5)
MCHC RBC AUTO-ENTMCNC: 33.1 G/DL (ref 25.2–33.5)
MCV RBC AUTO: 91.7 FL (ref 82.6–102.9)
MONOCYTES NFR BLD: 1.17 K/UL (ref 0.1–1.2)
MONOCYTES NFR BLD: 9 % (ref 3–12)
NEUTROPHILS NFR BLD: 64 % (ref 36–65)
NEUTS SEG NFR BLD: 8.94 K/UL (ref 1.5–8.1)
NRBC BLD-RTO: 0 PER 100 WBC
PLATELET # BLD AUTO: 291 K/UL (ref 138–453)
PMV BLD AUTO: 10.8 FL (ref 8.1–13.5)
POTASSIUM SERPL-SCNC: 3.8 MMOL/L (ref 3.7–5.3)
PROT SERPL-MCNC: 7.7 G/DL (ref 6.4–8.3)
RBC # BLD AUTO: 4.32 M/UL (ref 3.95–5.11)
SARS-COV-2 RDRP RESP QL NAA+PROBE: NOT DETECTED
SODIUM SERPL-SCNC: 139 MMOL/L (ref 135–144)
SPECIMEN DESCRIPTION: NORMAL
TROPONIN I SERPL HS-MCNC: <6 NG/L (ref 0–14)
WBC OTHER # BLD: 13.8 K/UL (ref 3.5–11.3)

## 2023-07-16 PROCEDURE — 94640 AIRWAY INHALATION TREATMENT: CPT

## 2023-07-16 PROCEDURE — 93005 ELECTROCARDIOGRAM TRACING: CPT | Performed by: EMERGENCY MEDICINE

## 2023-07-16 PROCEDURE — 2580000003 HC RX 258

## 2023-07-16 PROCEDURE — 6360000002 HC RX W HCPCS: Performed by: EMERGENCY MEDICINE

## 2023-07-16 PROCEDURE — 87040 BLOOD CULTURE FOR BACTERIA: CPT

## 2023-07-16 PROCEDURE — 6370000000 HC RX 637 (ALT 250 FOR IP)

## 2023-07-16 PROCEDURE — 85027 COMPLETE CBC AUTOMATED: CPT

## 2023-07-16 PROCEDURE — 6370000000 HC RX 637 (ALT 250 FOR IP): Performed by: FAMILY MEDICINE

## 2023-07-16 PROCEDURE — 2580000003 HC RX 258: Performed by: FAMILY MEDICINE

## 2023-07-16 PROCEDURE — 6360000002 HC RX W HCPCS

## 2023-07-16 PROCEDURE — 2060000000 HC ICU INTERMEDIATE R&B

## 2023-07-16 PROCEDURE — 85379 FIBRIN DEGRADATION QUANT: CPT

## 2023-07-16 PROCEDURE — 84146 ASSAY OF PROLACTIN: CPT

## 2023-07-16 PROCEDURE — 80053 COMPREHEN METABOLIC PANEL: CPT

## 2023-07-16 PROCEDURE — 99222 1ST HOSP IP/OBS MODERATE 55: CPT

## 2023-07-16 PROCEDURE — 71046 X-RAY EXAM CHEST 2 VIEWS: CPT

## 2023-07-16 PROCEDURE — 94760 N-INVAS EAR/PLS OXIMETRY 1: CPT

## 2023-07-16 PROCEDURE — 87635 SARS-COV-2 COVID-19 AMP PRB: CPT

## 2023-07-16 PROCEDURE — 99285 EMERGENCY DEPT VISIT HI MDM: CPT

## 2023-07-16 PROCEDURE — 36415 COLL VENOUS BLD VENIPUNCTURE: CPT

## 2023-07-16 PROCEDURE — 83605 ASSAY OF LACTIC ACID: CPT

## 2023-07-16 PROCEDURE — 6370000000 HC RX 637 (ALT 250 FOR IP): Performed by: EMERGENCY MEDICINE

## 2023-07-16 PROCEDURE — 6360000002 HC RX W HCPCS: Performed by: FAMILY MEDICINE

## 2023-07-16 PROCEDURE — 2580000003 HC RX 258: Performed by: EMERGENCY MEDICINE

## 2023-07-16 PROCEDURE — 2700000000 HC OXYGEN THERAPY PER DAY

## 2023-07-16 PROCEDURE — 96374 THER/PROPH/DIAG INJ IV PUSH: CPT

## 2023-07-16 PROCEDURE — 84484 ASSAY OF TROPONIN QUANT: CPT

## 2023-07-16 RX ORDER — AZITHROMYCIN 250 MG/1
500 TABLET, FILM COATED ORAL DAILY
Status: DISCONTINUED | OUTPATIENT
Start: 2023-07-17 | End: 2023-07-19 | Stop reason: HOSPADM

## 2023-07-16 RX ORDER — TRAZODONE HYDROCHLORIDE 50 MG/1
50 TABLET ORAL NIGHTLY PRN
Status: DISCONTINUED | OUTPATIENT
Start: 2023-07-16 | End: 2023-07-19 | Stop reason: HOSPADM

## 2023-07-16 RX ORDER — ONDANSETRON 4 MG/1
4 TABLET, ORALLY DISINTEGRATING ORAL EVERY 8 HOURS PRN
Status: DISCONTINUED | OUTPATIENT
Start: 2023-07-16 | End: 2023-07-19 | Stop reason: HOSPADM

## 2023-07-16 RX ORDER — LEVALBUTEROL INHALATION SOLUTION 1.25 MG/3ML
1.25 SOLUTION RESPIRATORY (INHALATION)
Status: DISCONTINUED | OUTPATIENT
Start: 2023-07-17 | End: 2023-07-19 | Stop reason: HOSPADM

## 2023-07-16 RX ORDER — SODIUM CHLORIDE 9 MG/ML
INJECTION, SOLUTION INTRAVENOUS CONTINUOUS
Status: DISCONTINUED | OUTPATIENT
Start: 2023-07-16 | End: 2023-07-16

## 2023-07-16 RX ORDER — BUDESONIDE AND FORMOTEROL FUMARATE DIHYDRATE 160; 4.5 UG/1; UG/1
1 AEROSOL RESPIRATORY (INHALATION) 2 TIMES DAILY
Status: DISCONTINUED | OUTPATIENT
Start: 2023-07-16 | End: 2023-07-16 | Stop reason: CLARIF

## 2023-07-16 RX ORDER — IPRATROPIUM BROMIDE AND ALBUTEROL SULFATE 2.5; .5 MG/3ML; MG/3ML
1 SOLUTION RESPIRATORY (INHALATION)
Status: DISCONTINUED | OUTPATIENT
Start: 2023-07-16 | End: 2023-07-16 | Stop reason: SDUPTHER

## 2023-07-16 RX ORDER — ACETAMINOPHEN 325 MG/1
650 TABLET ORAL EVERY 6 HOURS PRN
Status: DISCONTINUED | OUTPATIENT
Start: 2023-07-16 | End: 2023-07-19 | Stop reason: HOSPADM

## 2023-07-16 RX ORDER — CITALOPRAM 20 MG/1
40 TABLET ORAL DAILY
Status: DISCONTINUED | OUTPATIENT
Start: 2023-07-16 | End: 2023-07-19 | Stop reason: HOSPADM

## 2023-07-16 RX ORDER — VITAMIN B COMPLEX
2000 TABLET ORAL DAILY
Status: DISCONTINUED | OUTPATIENT
Start: 2023-07-16 | End: 2023-07-18

## 2023-07-16 RX ORDER — BUSPIRONE HYDROCHLORIDE 5 MG/1
10 TABLET ORAL 2 TIMES DAILY
Status: DISCONTINUED | OUTPATIENT
Start: 2023-07-16 | End: 2023-07-19 | Stop reason: HOSPADM

## 2023-07-16 RX ORDER — ONDANSETRON 2 MG/ML
4 INJECTION INTRAMUSCULAR; INTRAVENOUS EVERY 6 HOURS PRN
Status: DISCONTINUED | OUTPATIENT
Start: 2023-07-16 | End: 2023-07-19 | Stop reason: HOSPADM

## 2023-07-16 RX ORDER — MONTELUKAST SODIUM 10 MG/1
10 TABLET ORAL NIGHTLY
Status: DISCONTINUED | OUTPATIENT
Start: 2023-07-16 | End: 2023-07-19 | Stop reason: HOSPADM

## 2023-07-16 RX ORDER — ENOXAPARIN SODIUM 100 MG/ML
40 INJECTION SUBCUTANEOUS DAILY
Status: DISCONTINUED | OUTPATIENT
Start: 2023-07-16 | End: 2023-07-19 | Stop reason: HOSPADM

## 2023-07-16 RX ORDER — SODIUM CHLORIDE FOR INHALATION 0.9 %
3 VIAL, NEBULIZER (ML) INHALATION PRN
Status: DISCONTINUED | OUTPATIENT
Start: 2023-07-16 | End: 2023-07-19 | Stop reason: HOSPADM

## 2023-07-16 RX ORDER — NICOTINE 21 MG/24HR
1 PATCH, TRANSDERMAL 24 HOURS TRANSDERMAL DAILY
Status: DISCONTINUED | OUTPATIENT
Start: 2023-07-16 | End: 2023-07-19 | Stop reason: HOSPADM

## 2023-07-16 RX ORDER — POLYETHYLENE GLYCOL 3350 17 G/17G
17 POWDER, FOR SOLUTION ORAL DAILY PRN
Status: DISCONTINUED | OUTPATIENT
Start: 2023-07-16 | End: 2023-07-19 | Stop reason: HOSPADM

## 2023-07-16 RX ORDER — PANTOPRAZOLE SODIUM 40 MG/1
40 TABLET, DELAYED RELEASE ORAL
Status: DISCONTINUED | OUTPATIENT
Start: 2023-07-17 | End: 2023-07-19 | Stop reason: HOSPADM

## 2023-07-16 RX ORDER — MULTIVITAMIN WITH IRON
1 TABLET ORAL DAILY
Status: DISCONTINUED | OUTPATIENT
Start: 2023-07-16 | End: 2023-07-19 | Stop reason: HOSPADM

## 2023-07-16 RX ORDER — IPRATROPIUM BROMIDE AND ALBUTEROL SULFATE 2.5; .5 MG/3ML; MG/3ML
1 SOLUTION RESPIRATORY (INHALATION)
Status: DISCONTINUED | OUTPATIENT
Start: 2023-07-16 | End: 2023-07-16

## 2023-07-16 RX ORDER — ALPRAZOLAM 0.25 MG/1
0.5 TABLET ORAL 3 TIMES DAILY PRN
Status: DISCONTINUED | OUTPATIENT
Start: 2023-07-16 | End: 2023-07-19 | Stop reason: HOSPADM

## 2023-07-16 RX ORDER — ATORVASTATIN CALCIUM 10 MG/1
10 TABLET, FILM COATED ORAL DAILY
Status: DISCONTINUED | OUTPATIENT
Start: 2023-07-16 | End: 2023-07-19 | Stop reason: HOSPADM

## 2023-07-16 RX ORDER — METOPROLOL TARTRATE 50 MG/1
50 TABLET, FILM COATED ORAL 2 TIMES DAILY
Status: DISCONTINUED | OUTPATIENT
Start: 2023-07-16 | End: 2023-07-19 | Stop reason: HOSPADM

## 2023-07-16 RX ORDER — LANOLIN ALCOHOL/MO/W.PET/CERES
6 CREAM (GRAM) TOPICAL NIGHTLY PRN
Status: DISCONTINUED | OUTPATIENT
Start: 2023-07-16 | End: 2023-07-19 | Stop reason: HOSPADM

## 2023-07-16 RX ORDER — OXYBUTYNIN CHLORIDE 5 MG/1
10 TABLET, EXTENDED RELEASE ORAL DAILY
Status: DISCONTINUED | OUTPATIENT
Start: 2023-07-16 | End: 2023-07-19 | Stop reason: HOSPADM

## 2023-07-16 RX ORDER — IPRATROPIUM BROMIDE AND ALBUTEROL SULFATE 2.5; .5 MG/3ML; MG/3ML
1 SOLUTION RESPIRATORY (INHALATION) EVERY 4 HOURS PRN
Status: DISCONTINUED | OUTPATIENT
Start: 2023-07-16 | End: 2023-07-19 | Stop reason: HOSPADM

## 2023-07-16 RX ORDER — SODIUM CHLORIDE 0.9 % (FLUSH) 0.9 %
5-40 SYRINGE (ML) INJECTION 2 TIMES DAILY
Status: DISCONTINUED | OUTPATIENT
Start: 2023-07-16 | End: 2023-07-19 | Stop reason: HOSPADM

## 2023-07-16 RX ORDER — ACETAMINOPHEN 650 MG/1
650 SUPPOSITORY RECTAL EVERY 6 HOURS PRN
Status: DISCONTINUED | OUTPATIENT
Start: 2023-07-16 | End: 2023-07-19 | Stop reason: HOSPADM

## 2023-07-16 RX ORDER — MIDODRINE HYDROCHLORIDE 5 MG/1
5 TABLET ORAL
Status: DISCONTINUED | OUTPATIENT
Start: 2023-07-16 | End: 2023-07-19 | Stop reason: HOSPADM

## 2023-07-16 RX ORDER — FLUTICASONE PROPIONATE 50 MCG
1 SPRAY, SUSPENSION (ML) NASAL 2 TIMES DAILY
Status: DISCONTINUED | OUTPATIENT
Start: 2023-07-16 | End: 2023-07-19 | Stop reason: HOSPADM

## 2023-07-16 RX ADMIN — CITALOPRAM HYDROBROMIDE 40 MG: 20 TABLET ORAL at 21:59

## 2023-07-16 RX ADMIN — IPRATROPIUM BROMIDE AND ALBUTEROL SULFATE 1 DOSE: .5; 2.5 SOLUTION RESPIRATORY (INHALATION) at 21:02

## 2023-07-16 RX ADMIN — THERA TABS 1 TABLET: TAB at 21:59

## 2023-07-16 RX ADMIN — ATORVASTATIN CALCIUM 10 MG: 10 TABLET, FILM COATED ORAL at 20:02

## 2023-07-16 RX ADMIN — SODIUM CHLORIDE, PRESERVATIVE FREE 10 ML: 5 INJECTION INTRAVENOUS at 22:58

## 2023-07-16 RX ADMIN — BUSPIRONE HYDROCHLORIDE 10 MG: 5 TABLET ORAL at 20:02

## 2023-07-16 RX ADMIN — AZITHROMYCIN MONOHYDRATE 500 MG: 500 INJECTION, POWDER, LYOPHILIZED, FOR SOLUTION INTRAVENOUS at 17:20

## 2023-07-16 RX ADMIN — MONTELUKAST 10 MG: 10 TABLET, FILM COATED ORAL at 20:02

## 2023-07-16 RX ADMIN — IPRATROPIUM BROMIDE AND ALBUTEROL SULFATE 1 DOSE: .5; 2.5 SOLUTION RESPIRATORY (INHALATION) at 15:13

## 2023-07-16 RX ADMIN — METHYLPREDNISOLONE SODIUM SUCCINATE 125 MG: 125 INJECTION, POWDER, FOR SOLUTION INTRAMUSCULAR; INTRAVENOUS at 15:17

## 2023-07-16 RX ADMIN — ENOXAPARIN SODIUM 40 MG: 100 INJECTION SUBCUTANEOUS at 22:00

## 2023-07-16 RX ADMIN — MIDODRINE HYDROCHLORIDE 5 MG: 5 TABLET ORAL at 21:59

## 2023-07-16 RX ADMIN — MELATONIN 6 MG: 3 TAB ORAL at 21:59

## 2023-07-16 RX ADMIN — MOMETASONE FUROATE AND FORMOTEROL FUMARATE DIHYDRATE 2 PUFF: 200; 5 AEROSOL RESPIRATORY (INHALATION) at 21:02

## 2023-07-16 RX ADMIN — ALPRAZOLAM 0.5 MG: 0.25 TABLET ORAL at 21:59

## 2023-07-16 RX ADMIN — SODIUM CHLORIDE: 9 INJECTION, SOLUTION INTRAVENOUS at 18:46

## 2023-07-16 RX ADMIN — CEFTRIAXONE 1000 MG: 1 INJECTION, POWDER, FOR SOLUTION INTRAMUSCULAR; INTRAVENOUS at 22:50

## 2023-07-16 RX ADMIN — METOPROLOL TARTRATE 50 MG: 50 TABLET ORAL at 20:02

## 2023-07-16 ASSESSMENT — ENCOUNTER SYMPTOMS
WHEEZING: 1
EYE PAIN: 0
VOMITING: 0
ABDOMINAL PAIN: 0
BACK PAIN: 0
DIARRHEA: 0
SHORTNESS OF BREATH: 1
NAUSEA: 1
COUGH: 1

## 2023-07-16 ASSESSMENT — LIFESTYLE VARIABLES
HOW OFTEN DO YOU HAVE A DRINK CONTAINING ALCOHOL: MONTHLY OR LESS
HOW MANY STANDARD DRINKS CONTAINING ALCOHOL DO YOU HAVE ON A TYPICAL DAY: 1 OR 2

## 2023-07-16 ASSESSMENT — PAIN - FUNCTIONAL ASSESSMENT
PAIN_FUNCTIONAL_ASSESSMENT: NONE - DENIES PAIN

## 2023-07-17 LAB
ANION GAP SERPL CALCULATED.3IONS-SCNC: 13 MMOL/L (ref 9–17)
BASOPHILS # BLD: <0.03 K/UL (ref 0–0.2)
BASOPHILS NFR BLD: 0 % (ref 0–2)
BUN SERPL-MCNC: 12 MG/DL (ref 6–20)
BUN/CREAT SERPL: 17 (ref 9–20)
CALCIUM SERPL-MCNC: 9.7 MG/DL (ref 8.6–10.4)
CHLORIDE SERPL-SCNC: 104 MMOL/L (ref 98–107)
CO2 SERPL-SCNC: 23 MMOL/L (ref 20–31)
CREAT SERPL-MCNC: 0.7 MG/DL (ref 0.5–0.9)
EOSINOPHIL # BLD: <0.03 K/UL (ref 0–0.44)
EOSINOPHILS RELATIVE PERCENT: 0 % (ref 1–4)
ERYTHROCYTE [DISTWIDTH] IN BLOOD BY AUTOMATED COUNT: 13.2 % (ref 11.8–14.4)
GFR SERPL CREATININE-BSD FRML MDRD: >60 ML/MIN/1.73M2
GLUCOSE SERPL-MCNC: 145 MG/DL (ref 70–99)
HCT VFR BLD AUTO: 36.4 % (ref 36.3–47.1)
HGB BLD-MCNC: 12.2 G/DL (ref 11.9–15.1)
IMM GRANULOCYTES # BLD AUTO: 0.04 K/UL (ref 0–0.3)
IMM GRANULOCYTES NFR BLD: 0 %
LYMPHOCYTES # BLD: 8 % (ref 24–43)
LYMPHOCYTES NFR BLD: 1.15 K/UL (ref 1.1–3.7)
MCH RBC QN AUTO: 30.8 PG (ref 25.2–33.5)
MCHC RBC AUTO-ENTMCNC: 33.5 G/DL (ref 25.2–33.5)
MCV RBC AUTO: 91.9 FL (ref 82.6–102.9)
METER GLUCOSE: 146 MG/DL (ref 65–105)
METER GLUCOSE: 162 MG/DL (ref 65–105)
METER GLUCOSE: 187 MG/DL (ref 65–105)
MONOCYTES NFR BLD: 0.57 K/UL (ref 0.1–1.2)
MONOCYTES NFR BLD: 4 % (ref 3–12)
NEUTROPHILS NFR BLD: 88 % (ref 36–65)
NEUTS SEG NFR BLD: 13.44 K/UL (ref 1.5–8.1)
NRBC BLD-RTO: 0 PER 100 WBC
PLATELET # BLD AUTO: 289 K/UL (ref 138–453)
PMV BLD AUTO: 10.8 FL (ref 8.1–13.5)
POTASSIUM SERPL-SCNC: 4.6 MMOL/L (ref 3.7–5.3)
PROLACTIN SERPL-MCNC: 13.03 NG/ML (ref 4.79–23.3)
RBC # BLD AUTO: 3.96 M/UL (ref 3.95–5.11)
SODIUM SERPL-SCNC: 140 MMOL/L (ref 135–144)
WBC OTHER # BLD: 15.2 K/UL (ref 3.5–11.3)

## 2023-07-17 PROCEDURE — 6370000000 HC RX 637 (ALT 250 FOR IP)

## 2023-07-17 PROCEDURE — 6370000000 HC RX 637 (ALT 250 FOR IP): Performed by: FAMILY MEDICINE

## 2023-07-17 PROCEDURE — 99222 1ST HOSP IP/OBS MODERATE 55: CPT | Performed by: INTERNAL MEDICINE

## 2023-07-17 PROCEDURE — 2700000000 HC OXYGEN THERAPY PER DAY

## 2023-07-17 PROCEDURE — 2060000000 HC ICU INTERMEDIATE R&B

## 2023-07-17 PROCEDURE — 6360000002 HC RX W HCPCS

## 2023-07-17 PROCEDURE — 82947 ASSAY GLUCOSE BLOOD QUANT: CPT

## 2023-07-17 PROCEDURE — 6360000002 HC RX W HCPCS: Performed by: FAMILY MEDICINE

## 2023-07-17 PROCEDURE — 94669 MECHANICAL CHEST WALL OSCILL: CPT

## 2023-07-17 PROCEDURE — 2580000003 HC RX 258

## 2023-07-17 PROCEDURE — 80048 BASIC METABOLIC PNL TOTAL CA: CPT

## 2023-07-17 PROCEDURE — 36415 COLL VENOUS BLD VENIPUNCTURE: CPT

## 2023-07-17 PROCEDURE — 94640 AIRWAY INHALATION TREATMENT: CPT

## 2023-07-17 PROCEDURE — 85027 COMPLETE CBC AUTOMATED: CPT

## 2023-07-17 PROCEDURE — 6370000000 HC RX 637 (ALT 250 FOR IP): Performed by: INTERNAL MEDICINE

## 2023-07-17 RX ORDER — GUAIFENESIN 600 MG/1
600 TABLET, EXTENDED RELEASE ORAL 2 TIMES DAILY
Status: DISCONTINUED | OUTPATIENT
Start: 2023-07-17 | End: 2023-07-19 | Stop reason: HOSPADM

## 2023-07-17 RX ORDER — INSULIN LISPRO 100 [IU]/ML
0-4 INJECTION, SOLUTION INTRAVENOUS; SUBCUTANEOUS
Status: DISCONTINUED | OUTPATIENT
Start: 2023-07-17 | End: 2023-07-19 | Stop reason: HOSPADM

## 2023-07-17 RX ORDER — DEXTROSE MONOHYDRATE 100 MG/ML
INJECTION, SOLUTION INTRAVENOUS CONTINUOUS PRN
Status: DISCONTINUED | OUTPATIENT
Start: 2023-07-17 | End: 2023-07-19 | Stop reason: HOSPADM

## 2023-07-17 RX ORDER — INSULIN LISPRO 100 [IU]/ML
0-4 INJECTION, SOLUTION INTRAVENOUS; SUBCUTANEOUS NIGHTLY
Status: DISCONTINUED | OUTPATIENT
Start: 2023-07-17 | End: 2023-07-19 | Stop reason: HOSPADM

## 2023-07-17 RX ORDER — INSULIN GLARGINE 100 [IU]/ML
5 INJECTION, SOLUTION SUBCUTANEOUS DAILY
Status: DISCONTINUED | OUTPATIENT
Start: 2023-07-17 | End: 2023-07-19 | Stop reason: HOSPADM

## 2023-07-17 RX ADMIN — BUSPIRONE HYDROCHLORIDE 10 MG: 5 TABLET ORAL at 20:27

## 2023-07-17 RX ADMIN — MIDODRINE HYDROCHLORIDE 5 MG: 5 TABLET ORAL at 09:57

## 2023-07-17 RX ADMIN — METOPROLOL TARTRATE 50 MG: 50 TABLET ORAL at 09:41

## 2023-07-17 RX ADMIN — CEFTRIAXONE 1000 MG: 1 INJECTION, POWDER, FOR SOLUTION INTRAMUSCULAR; INTRAVENOUS at 21:21

## 2023-07-17 RX ADMIN — PANTOPRAZOLE SODIUM 40 MG: 40 TABLET, DELAYED RELEASE ORAL at 05:46

## 2023-07-17 RX ADMIN — METHYLPREDNISOLONE SODIUM SUCCINATE 80 MG: 125 INJECTION, POWDER, FOR SOLUTION INTRAMUSCULAR; INTRAVENOUS at 17:45

## 2023-07-17 RX ADMIN — MELATONIN 6 MG: 3 TAB ORAL at 20:28

## 2023-07-17 RX ADMIN — LEVALBUTEROL HYDROCHLORIDE 1.25 MG: 1.25 SOLUTION RESPIRATORY (INHALATION) at 12:38

## 2023-07-17 RX ADMIN — MIDODRINE HYDROCHLORIDE 5 MG: 5 TABLET ORAL at 12:31

## 2023-07-17 RX ADMIN — MOMETASONE FUROATE AND FORMOTEROL FUMARATE DIHYDRATE 2 PUFF: 200; 5 AEROSOL RESPIRATORY (INHALATION) at 20:02

## 2023-07-17 RX ADMIN — MIDODRINE HYDROCHLORIDE 5 MG: 5 TABLET ORAL at 17:45

## 2023-07-17 RX ADMIN — METOPROLOL TARTRATE 50 MG: 50 TABLET ORAL at 20:28

## 2023-07-17 RX ADMIN — METHYLPREDNISOLONE SODIUM SUCCINATE 80 MG: 125 INJECTION, POWDER, FOR SOLUTION INTRAMUSCULAR; INTRAVENOUS at 05:46

## 2023-07-17 RX ADMIN — SODIUM CHLORIDE, PRESERVATIVE FREE 10 ML: 5 INJECTION INTRAVENOUS at 20:32

## 2023-07-17 RX ADMIN — MONTELUKAST 10 MG: 10 TABLET, FILM COATED ORAL at 20:28

## 2023-07-17 RX ADMIN — SODIUM CHLORIDE, PRESERVATIVE FREE 10 ML: 5 INJECTION INTRAVENOUS at 09:53

## 2023-07-17 RX ADMIN — ALPRAZOLAM 0.5 MG: 0.25 TABLET ORAL at 21:20

## 2023-07-17 RX ADMIN — INSULIN GLARGINE 5 UNITS: 100 INJECTION, SOLUTION SUBCUTANEOUS at 09:41

## 2023-07-17 RX ADMIN — CITALOPRAM HYDROBROMIDE 40 MG: 20 TABLET ORAL at 09:41

## 2023-07-17 RX ADMIN — AZITHROMYCIN MONOHYDRATE 500 MG: 250 TABLET ORAL at 17:45

## 2023-07-17 RX ADMIN — MOMETASONE FUROATE AND FORMOTEROL FUMARATE DIHYDRATE 2 PUFF: 200; 5 AEROSOL RESPIRATORY (INHALATION) at 08:12

## 2023-07-17 RX ADMIN — GUAIFENESIN 600 MG: 600 TABLET, EXTENDED RELEASE ORAL at 20:27

## 2023-07-17 RX ADMIN — THERA TABS 1 TABLET: TAB at 20:28

## 2023-07-17 RX ADMIN — OXYBUTYNIN CHLORIDE 10 MG: 5 TABLET, EXTENDED RELEASE ORAL at 09:41

## 2023-07-17 RX ADMIN — METOPROLOL TARTRATE 12.5 MG: 25 TABLET, FILM COATED ORAL at 18:18

## 2023-07-17 RX ADMIN — ENOXAPARIN SODIUM 40 MG: 100 INJECTION SUBCUTANEOUS at 09:41

## 2023-07-17 RX ADMIN — LEVALBUTEROL HYDROCHLORIDE 1.25 MG: 1.25 SOLUTION RESPIRATORY (INHALATION) at 08:10

## 2023-07-17 RX ADMIN — CHOLECALCIFEROL TAB 25 MCG (1000 UNIT) 2000 UNITS: 25 TAB at 09:41

## 2023-07-17 RX ADMIN — BUSPIRONE HYDROCHLORIDE 10 MG: 5 TABLET ORAL at 09:41

## 2023-07-17 RX ADMIN — LEVALBUTEROL HYDROCHLORIDE 1.25 MG: 1.25 SOLUTION RESPIRATORY (INHALATION) at 16:08

## 2023-07-17 RX ADMIN — ATORVASTATIN CALCIUM 10 MG: 10 TABLET, FILM COATED ORAL at 20:27

## 2023-07-17 RX ADMIN — LEVALBUTEROL HYDROCHLORIDE 1.25 MG: 1.25 SOLUTION RESPIRATORY (INHALATION) at 20:02

## 2023-07-17 NOTE — H&P
HOSPITALIST ADMISSION H&P      REASON FOR ADMISSION:  COPD exacerbation   ESTIMATED LENGTH OF STAY:> 2 midnights, 3-4 days    ATTENDING/ADMITTING PHYSICIAN: Halley Shannon MD  PCP: PIERO Chicas    HISTORY OF PRESENT ILLNESS:      The patient is a 37 y.o. female patient of PIERO Chicas who presents from ER with c/o COPD. Patient reports she has been feeling short of breath since Thursday (7/13/23), she also reports quitting smoking on the same day. Patient states she has trouble breathing all the time, c/o upper chest pressure-points to clavicular mid chest area, c/o nausea without vomiting, denies diarrhea or constipation. Patient was up walking in room prior to writer entering room with oxygen off-pulse ox on RA 91% after activity. Patient was last seen in urgent care with Dr. Khris Mendieta on 6/23/23 for COPD exacerbation was started on omnicef and deltasone. Prior to this visit was seen in Walk-in clinic with Jesse Sifuentes and started on doxycycline-was not taking albuterol or symbicort at that time-was instructed to restart. In ER: Azithromycin IV, Solumedrol IV. CXR:  IMPRESSION:  Increased interstitial opacity. While much or all of this may be chronic,  please correlate with any clinical evidence of acute interstitial edema. Otherwise, no acute abnormality identified. EKG:   Normal sinus rhythm  Low voltage QRS  Borderline ECG. Wounds and LDAs present prior to admission: None     See below for additional PMH. Patient [id number]-available records reviewed, including, but not limited to ER records, imaging results, lab results, office records, personal records, and OARRS -- no signs of abuse or diversion. 2 Prescriptions, 2 prescribers, 2 pharmacies in 2 years.      Past Medical History:   Diagnosis Date    Atrophic vaginitis 03/10/2016    Chronic fatigue     Constipation     Endometriosis     h/o endometriosis for which she had a hysterectomy    Genital herpes     Hyperlipidemia

## 2023-07-17 NOTE — PLAN OF CARE
Problem: Discharge Planning  Goal: Discharge to home or other facility with appropriate resources  Outcome: Progressing     Problem: Safety - Adult  Goal: Free from fall injury  Outcome: Progressing     Problem: Respiratory - Adult  Goal: Achieves optimal ventilation and oxygenation  Outcome: Progressing     Problem: Infection - Adult  Goal: Absence of infection at discharge  Outcome: Progressing  Goal: Absence of infection during hospitalization  Outcome: Progressing

## 2023-07-17 NOTE — CARE COORDINATION
Case Management Assessment  Initial Evaluation    Date/Time of Evaluation: 7/17/2023 10:48 AM  Assessment Completed by: William Muhammad RN    If patient is discharged prior to next notation, then this note serves as note for discharge by case management. Patient Name: Linh Dowd                   YOB: 1980  Diagnosis: Acute exacerbation of chronic obstructive pulmonary disease (COPD) ([address]) [J44.1]                   Date / Time: 7/16/2023  2:48 PM    Patient Admission Status: Inpatient   Readmission Risk (Low < 19, Mod (19-27), High > 27): Readmission Risk Score: 9.2    Current PCP: PIERO Verma  PCP verified by CM? Yes    Chart Reviewed: Yes      History Provided by: Patient  Patient Orientation: Alert and Oriented    Patient Cognition: Alert    Hospitalization in the last 30 days (Readmission):  No    If yes, Readmission Assessment in CM Navigator will be completed. Advance Directives:      Code Status: Full Code   Patient's Primary Decision Maker is:        Discharge Planning:    Patient lives with: Spouse/Significant Other Type of Home: House  Primary Care Giver: Self  Patient Support Systems include: Spouse/Significant Other   Current Financial resources: None  Current community resources: None  Current services prior to admission: None            Current DME:              Type of Home Care services:  None    ADLS  Prior functional level: Independent in ADLs/IADLs  Current functional level: Independent in ADLs/IADLs    PT AM-PAC:   /24  OT AM-PAC:   /24    Family can provide assistance at DC: Yes  Would you like Case Management to discuss the discharge plan with any other family members/significant others, and if so, who?  No  Plans to Return to Present Housing: Yes  Other Identified Issues/Barriers to RETURNING to current housing: yes  Potential Assistance needed at discharge: N/A            Potential DME:    Patient expects to discharge to: [address] [place] [address] for transportation at

## 2023-07-18 ENCOUNTER — APPOINTMENT (OUTPATIENT)
Dept: GENERAL RADIOLOGY | Age: 43
DRG: 192 | End: 2023-07-18
Payer: COMMERCIAL

## 2023-07-18 LAB
ANION GAP SERPL CALCULATED.3IONS-SCNC: 16 MMOL/L (ref 9–17)
BASOPHILS # BLD: 0 K/UL (ref 0–0.2)
BASOPHILS NFR BLD: 0 % (ref 0–1)
BUN SERPL-MCNC: 16 MG/DL (ref 6–20)
BUN/CREAT SERPL: 23 (ref 9–20)
CALCIUM SERPL-MCNC: 9.5 MG/DL (ref 8.6–10.4)
CHLORIDE SERPL-SCNC: 102 MMOL/L (ref 98–107)
CO2 SERPL-SCNC: 22 MMOL/L (ref 20–31)
CREAT SERPL-MCNC: 0.7 MG/DL (ref 0.5–0.9)
EOSINOPHIL # BLD: 0 K/UL (ref 0–0.4)
EOSINOPHILS RELATIVE PERCENT: 0 % (ref 1–7)
ERYTHROCYTE [DISTWIDTH] IN BLOOD BY AUTOMATED COUNT: 13.4 % (ref 11.8–14.4)
GFR SERPL CREATININE-BSD FRML MDRD: >60 ML/MIN/1.73M2
GLUCOSE SERPL-MCNC: 129 MG/DL (ref 70–99)
HCT VFR BLD AUTO: 35.9 % (ref 36.3–47.1)
HGB BLD-MCNC: 11.8 G/DL (ref 11.9–15.1)
IMM GRANULOCYTES # BLD AUTO: 0 K/UL (ref 0–0.3)
IMM GRANULOCYTES NFR BLD: 0 %
LYMPHOCYTES # BLD: 8 % (ref 16–46)
LYMPHOCYTES NFR BLD: 2.05 K/UL (ref 1–4.8)
MCH RBC QN AUTO: 31.1 PG (ref 25.2–33.5)
MCHC RBC AUTO-ENTMCNC: 32.9 G/DL (ref 25.2–33.5)
MCV RBC AUTO: 94.7 FL (ref 82.6–102.9)
METER GLUCOSE: 131 MG/DL (ref 65–105)
METER GLUCOSE: 163 MG/DL (ref 65–105)
METER GLUCOSE: 169 MG/DL (ref 65–105)
MONOCYTES NFR BLD: 0.26 K/UL (ref 0.1–1.2)
MONOCYTES NFR BLD: 1 % (ref 4–11)
MORPHOLOGY: ABNORMAL
MORPHOLOGY: ABNORMAL
NEUTROPHILS NFR BLD: 91 % (ref 43–77)
NEUTS SEG NFR BLD: 23.29 K/UL (ref 1.5–8.1)
NRBC BLD-RTO: 0 PER 100 WBC
PLATELET # BLD AUTO: 301 K/UL (ref 138–453)
PMV BLD AUTO: 10.5 FL (ref 8.1–13.5)
POTASSIUM SERPL-SCNC: 4.6 MMOL/L (ref 3.7–5.3)
RBC # BLD AUTO: 3.79 M/UL (ref 3.95–5.11)
SODIUM SERPL-SCNC: 140 MMOL/L (ref 135–144)
WBC OTHER # BLD: 25.6 K/UL (ref 3.5–11.3)

## 2023-07-18 PROCEDURE — 82947 ASSAY GLUCOSE BLOOD QUANT: CPT

## 2023-07-18 PROCEDURE — 6370000000 HC RX 637 (ALT 250 FOR IP): Performed by: FAMILY MEDICINE

## 2023-07-18 PROCEDURE — 6360000002 HC RX W HCPCS: Performed by: FAMILY MEDICINE

## 2023-07-18 PROCEDURE — 36415 COLL VENOUS BLD VENIPUNCTURE: CPT

## 2023-07-18 PROCEDURE — 6370000000 HC RX 637 (ALT 250 FOR IP)

## 2023-07-18 PROCEDURE — 6360000002 HC RX W HCPCS

## 2023-07-18 PROCEDURE — 94669 MECHANICAL CHEST WALL OSCILL: CPT

## 2023-07-18 PROCEDURE — 85027 COMPLETE CBC AUTOMATED: CPT

## 2023-07-18 PROCEDURE — 94761 N-INVAS EAR/PLS OXIMETRY MLT: CPT

## 2023-07-18 PROCEDURE — 94640 AIRWAY INHALATION TREATMENT: CPT

## 2023-07-18 PROCEDURE — 71046 X-RAY EXAM CHEST 2 VIEWS: CPT

## 2023-07-18 PROCEDURE — 2580000003 HC RX 258

## 2023-07-18 PROCEDURE — 99231 SBSQ HOSP IP/OBS SF/LOW 25: CPT | Performed by: INTERNAL MEDICINE

## 2023-07-18 PROCEDURE — 6370000000 HC RX 637 (ALT 250 FOR IP): Performed by: INTERNAL MEDICINE

## 2023-07-18 PROCEDURE — 2700000000 HC OXYGEN THERAPY PER DAY

## 2023-07-18 PROCEDURE — 2060000000 HC ICU INTERMEDIATE R&B

## 2023-07-18 PROCEDURE — 80048 BASIC METABOLIC PNL TOTAL CA: CPT

## 2023-07-18 RX ORDER — BUTALBITAL, ACETAMINOPHEN AND CAFFEINE 50; 325; 40 MG/1; MG/1; MG/1
1 TABLET ORAL EVERY 4 HOURS PRN
Status: DISCONTINUED | OUTPATIENT
Start: 2023-07-18 | End: 2023-07-19 | Stop reason: HOSPADM

## 2023-07-18 RX ORDER — VITAMIN B COMPLEX
2000 TABLET ORAL NIGHTLY
Status: DISCONTINUED | OUTPATIENT
Start: 2023-07-18 | End: 2023-07-19 | Stop reason: HOSPADM

## 2023-07-18 RX ADMIN — ATORVASTATIN CALCIUM 10 MG: 10 TABLET, FILM COATED ORAL at 20:59

## 2023-07-18 RX ADMIN — INSULIN GLARGINE 5 UNITS: 100 INJECTION, SOLUTION SUBCUTANEOUS at 09:51

## 2023-07-18 RX ADMIN — MIDODRINE HYDROCHLORIDE 5 MG: 5 TABLET ORAL at 17:57

## 2023-07-18 RX ADMIN — BUTALBITAL, ACETAMINOPHEN, AND CAFFEINE 1 TABLET: 50; 325; 40 TABLET ORAL at 11:55

## 2023-07-18 RX ADMIN — BUSPIRONE HYDROCHLORIDE 10 MG: 5 TABLET ORAL at 09:49

## 2023-07-18 RX ADMIN — METHYLPREDNISOLONE SODIUM SUCCINATE 80 MG: 125 INJECTION, POWDER, FOR SOLUTION INTRAMUSCULAR; INTRAVENOUS at 05:09

## 2023-07-18 RX ADMIN — MIDODRINE HYDROCHLORIDE 5 MG: 5 TABLET ORAL at 10:02

## 2023-07-18 RX ADMIN — MIDODRINE HYDROCHLORIDE 5 MG: 5 TABLET ORAL at 13:15

## 2023-07-18 RX ADMIN — LEVALBUTEROL HYDROCHLORIDE 1.25 MG: 1.25 SOLUTION RESPIRATORY (INHALATION) at 08:05

## 2023-07-18 RX ADMIN — METOPROLOL TARTRATE 50 MG: 50 TABLET ORAL at 20:59

## 2023-07-18 RX ADMIN — AZITHROMYCIN MONOHYDRATE 500 MG: 250 TABLET ORAL at 17:58

## 2023-07-18 RX ADMIN — OXYBUTYNIN CHLORIDE 10 MG: 5 TABLET, EXTENDED RELEASE ORAL at 09:50

## 2023-07-18 RX ADMIN — LEVALBUTEROL HYDROCHLORIDE 1.25 MG: 1.25 SOLUTION RESPIRATORY (INHALATION) at 11:35

## 2023-07-18 RX ADMIN — THERA TABS 1 TABLET: TAB at 20:59

## 2023-07-18 RX ADMIN — GUAIFENESIN 600 MG: 600 TABLET, EXTENDED RELEASE ORAL at 20:59

## 2023-07-18 RX ADMIN — LEVALBUTEROL HYDROCHLORIDE 1.25 MG: 1.25 SOLUTION RESPIRATORY (INHALATION) at 16:10

## 2023-07-18 RX ADMIN — CHOLECALCIFEROL TAB 25 MCG (1000 UNIT) 2000 UNITS: 25 TAB at 20:59

## 2023-07-18 RX ADMIN — SODIUM CHLORIDE, PRESERVATIVE FREE 10 ML: 5 INJECTION INTRAVENOUS at 10:06

## 2023-07-18 RX ADMIN — MOMETASONE FUROATE AND FORMOTEROL FUMARATE DIHYDRATE 2 PUFF: 200; 5 AEROSOL RESPIRATORY (INHALATION) at 20:25

## 2023-07-18 RX ADMIN — PANTOPRAZOLE SODIUM 40 MG: 40 TABLET, DELAYED RELEASE ORAL at 05:08

## 2023-07-18 RX ADMIN — CITALOPRAM HYDROBROMIDE 40 MG: 20 TABLET ORAL at 09:49

## 2023-07-18 RX ADMIN — ALPRAZOLAM 0.5 MG: 0.25 TABLET ORAL at 21:04

## 2023-07-18 RX ADMIN — METOPROLOL TARTRATE 50 MG: 50 TABLET ORAL at 09:49

## 2023-07-18 RX ADMIN — BUSPIRONE HYDROCHLORIDE 10 MG: 5 TABLET ORAL at 20:59

## 2023-07-18 RX ADMIN — CEFTRIAXONE 1000 MG: 1 INJECTION, POWDER, FOR SOLUTION INTRAMUSCULAR; INTRAVENOUS at 22:10

## 2023-07-18 RX ADMIN — SODIUM CHLORIDE, PRESERVATIVE FREE 10 ML: 5 INJECTION INTRAVENOUS at 22:06

## 2023-07-18 RX ADMIN — MONTELUKAST 10 MG: 10 TABLET, FILM COATED ORAL at 20:59

## 2023-07-18 RX ADMIN — ENOXAPARIN SODIUM 40 MG: 100 INJECTION SUBCUTANEOUS at 09:50

## 2023-07-18 RX ADMIN — MOMETASONE FUROATE AND FORMOTEROL FUMARATE DIHYDRATE 2 PUFF: 200; 5 AEROSOL RESPIRATORY (INHALATION) at 08:06

## 2023-07-18 RX ADMIN — GUAIFENESIN 600 MG: 600 TABLET, EXTENDED RELEASE ORAL at 09:50

## 2023-07-18 RX ADMIN — LEVALBUTEROL HYDROCHLORIDE 1.25 MG: 1.25 SOLUTION RESPIRATORY (INHALATION) at 20:25

## 2023-07-18 RX ADMIN — METHYLPREDNISOLONE SODIUM SUCCINATE 80 MG: 125 INJECTION, POWDER, FOR SOLUTION INTRAMUSCULAR; INTRAVENOUS at 17:58

## 2023-07-18 ASSESSMENT — PAIN SCALES - GENERAL
PAINLEVEL_OUTOF10: 8
PAINLEVEL_OUTOF10: 0
PAINLEVEL_OUTOF10: 8

## 2023-07-18 ASSESSMENT — PAIN - FUNCTIONAL ASSESSMENT: PAIN_FUNCTIONAL_ASSESSMENT: ACTIVITIES ARE NOT PREVENTED

## 2023-07-18 ASSESSMENT — PAIN DESCRIPTION - LOCATION: LOCATION: HEAD

## 2023-07-18 ASSESSMENT — PAIN DESCRIPTION - DESCRIPTORS: DESCRIPTORS: THROBBING

## 2023-07-18 NOTE — CARE COORDINATION
DISCHARGE BARRIERS       Reason for Referral:  SW completed a Psychosocial Assessment for evaluation of patient's mental health, social status, and functional capacity within the community. Tom Jonas is a 37 y.o. female admitted due to Acute exacerbation of chronic obstructive pulmonary disease (COPD) ([address] [facility]). Patient alone. SW provided supportive listening while patient discussed past medical history and events leading up to hospitalization. Mental Status:  Alert, oriented, and engaging during assessment. Decision Making:  Makes own decisions. Family/Social/Home Environment: lives with their spouse    Support: Discussed a good social support network     Current Services:  None    Current DMEs: None    PCP: PIERO Segal and repots no issues affording medication.  status:   None     ADLs and means of transportation: Independent in ADLs prior to hospitalization and able to transport self. Food insecurity or needed financial assistance: Patient states she has \"some\" issues. Patient would not specify if it was issues affording food, medication, or financial concerns. Patient reports she needs no assistance or resources. ACP and Code Status:  SW discussed an Advance Directive which included the patient's choices for care and treatment in the case of a health event that adversely affects decision-making abilities. SW provided education and resources. Tom Jonas has no questions at this time and has agreed to keep me up-to-date should anything change. Tom Jonas is a Full Code status and has NO advanced directive - not interested in additional information. Collaborative List of SNF/ECF/HH were provided: offered, declined No discharge order at this time. Anticipated Needs/Discharge Plan:  Spoke with patient/family/representative about discharge plan.  Patient/Family/Representative verbalizes understanding of the plan of care and denies discharge

## 2023-07-18 NOTE — PLAN OF CARE
Problem: Discharge Planning  Goal: Discharge to home or other facility with appropriate resources  Outcome: Progressing     Problem: Safety - Adult  Goal: Free from fall injury  Outcome: Progressing     Problem: Respiratory - Adult  Goal: Achieves optimal ventilation and oxygenation  Outcome: Progressing     Problem: Infection - Adult  Goal: Absence of infection at discharge  Outcome: Progressing  Goal: Absence of infection during hospitalization  Outcome: Progressing     Problem: Pain  Goal: Verbalizes/displays adequate comfort level or baseline comfort level  Outcome: Progressing

## 2023-07-19 ENCOUNTER — TELEPHONE (OUTPATIENT)
Dept: FAMILY MEDICINE CLINIC | Age: 43
End: 2023-07-19

## 2023-07-19 ENCOUNTER — APPOINTMENT (OUTPATIENT)
Dept: GENERAL RADIOLOGY | Age: 43
DRG: 192 | End: 2023-07-19
Payer: COMMERCIAL

## 2023-07-19 VITALS
HEART RATE: 80 BPM | DIASTOLIC BLOOD PRESSURE: 74 MMHG | OXYGEN SATURATION: 94 % | TEMPERATURE: 98.8 F | HEIGHT: 62 IN | RESPIRATION RATE: 18 BRPM | BODY MASS INDEX: 26.92 KG/M2 | WEIGHT: 146.3 LBS | SYSTOLIC BLOOD PRESSURE: 108 MMHG

## 2023-07-19 LAB
ANION GAP SERPL CALCULATED.3IONS-SCNC: 10 MMOL/L (ref 9–17)
BASOPHILS # BLD: 0.03 K/UL (ref 0–0.2)
BASOPHILS NFR BLD: 0 % (ref 0–2)
BUN SERPL-MCNC: 19 MG/DL (ref 6–20)
BUN/CREAT SERPL: 27 (ref 9–20)
CALCIUM SERPL-MCNC: 9.6 MG/DL (ref 8.6–10.4)
CHLORIDE SERPL-SCNC: 103 MMOL/L (ref 98–107)
CO2 SERPL-SCNC: 26 MMOL/L (ref 20–31)
CREAT SERPL-MCNC: 0.7 MG/DL (ref 0.5–0.9)
EOSINOPHIL # BLD: <0.03 K/UL (ref 0–0.44)
EOSINOPHILS RELATIVE PERCENT: 0 % (ref 1–4)
ERYTHROCYTE [DISTWIDTH] IN BLOOD BY AUTOMATED COUNT: 13.2 % (ref 11.8–14.4)
GFR SERPL CREATININE-BSD FRML MDRD: >60 ML/MIN/1.73M2
GLUCOSE SERPL-MCNC: 135 MG/DL (ref 70–99)
HCT VFR BLD AUTO: 37 % (ref 36.3–47.1)
HGB BLD-MCNC: 11.8 G/DL (ref 11.9–15.1)
IMM GRANULOCYTES # BLD AUTO: 0.18 K/UL (ref 0–0.3)
IMM GRANULOCYTES NFR BLD: 1 %
LYMPHOCYTES # BLD: 10 % (ref 24–43)
LYMPHOCYTES NFR BLD: 1.86 K/UL (ref 1.1–3.7)
MCH RBC QN AUTO: 29.9 PG (ref 25.2–33.5)
MCHC RBC AUTO-ENTMCNC: 31.9 G/DL (ref 25.2–33.5)
MCV RBC AUTO: 93.7 FL (ref 82.6–102.9)
METER GLUCOSE: 155 MG/DL (ref 65–105)
MONOCYTES NFR BLD: 0.79 K/UL (ref 0.1–1.2)
MONOCYTES NFR BLD: 4 % (ref 3–12)
NEUTROPHILS NFR BLD: 85 % (ref 36–65)
NEUTS SEG NFR BLD: 16.51 K/UL (ref 1.5–8.1)
NRBC BLD-RTO: 0 PER 100 WBC
PLATELET # BLD AUTO: 331 K/UL (ref 138–453)
PMV BLD AUTO: 10.5 FL (ref 8.1–13.5)
POTASSIUM SERPL-SCNC: 5.2 MMOL/L (ref 3.7–5.3)
RBC # BLD AUTO: 3.95 M/UL (ref 3.95–5.11)
SODIUM SERPL-SCNC: 139 MMOL/L (ref 135–144)
WBC OTHER # BLD: 19.4 K/UL (ref 3.5–11.3)

## 2023-07-19 PROCEDURE — 36415 COLL VENOUS BLD VENIPUNCTURE: CPT

## 2023-07-19 PROCEDURE — 6370000000 HC RX 637 (ALT 250 FOR IP)

## 2023-07-19 PROCEDURE — 80048 BASIC METABOLIC PNL TOTAL CA: CPT

## 2023-07-19 PROCEDURE — 6370000000 HC RX 637 (ALT 250 FOR IP): Performed by: FAMILY MEDICINE

## 2023-07-19 PROCEDURE — 2700000000 HC OXYGEN THERAPY PER DAY

## 2023-07-19 PROCEDURE — 82947 ASSAY GLUCOSE BLOOD QUANT: CPT

## 2023-07-19 PROCEDURE — 94640 AIRWAY INHALATION TREATMENT: CPT

## 2023-07-19 PROCEDURE — 2580000003 HC RX 258

## 2023-07-19 PROCEDURE — 85027 COMPLETE CBC AUTOMATED: CPT

## 2023-07-19 PROCEDURE — 94669 MECHANICAL CHEST WALL OSCILL: CPT

## 2023-07-19 PROCEDURE — 99238 HOSP IP/OBS DSCHRG MGMT 30/<: CPT | Performed by: INTERNAL MEDICINE

## 2023-07-19 PROCEDURE — 6360000002 HC RX W HCPCS

## 2023-07-19 PROCEDURE — 6370000000 HC RX 637 (ALT 250 FOR IP): Performed by: INTERNAL MEDICINE

## 2023-07-19 PROCEDURE — 6360000002 HC RX W HCPCS: Performed by: FAMILY MEDICINE

## 2023-07-19 PROCEDURE — 71045 X-RAY EXAM CHEST 1 VIEW: CPT

## 2023-07-19 RX ORDER — LEVALBUTEROL TARTRATE 45 UG/1
2 AEROSOL, METERED ORAL EVERY 4 HOURS
Qty: 1 EACH | Refills: 0 | Status: SHIPPED | OUTPATIENT
Start: 2023-07-19 | End: 2023-08-18

## 2023-07-19 RX ORDER — AZITHROMYCIN 500 MG/1
500 TABLET, FILM COATED ORAL DAILY
Qty: 4 TABLET | Refills: 0 | Status: SHIPPED | OUTPATIENT
Start: 2023-07-19 | End: 2023-07-23

## 2023-07-19 RX ORDER — CEFDINIR 300 MG/1
300 CAPSULE ORAL 2 TIMES DAILY
Qty: 8 CAPSULE | Refills: 0 | Status: SHIPPED | OUTPATIENT
Start: 2023-07-19 | End: 2023-07-23

## 2023-07-19 RX ORDER — PREDNISONE 20 MG/1
40 TABLET ORAL DAILY
Qty: 8 TABLET | Refills: 0 | Status: SHIPPED | OUTPATIENT
Start: 2023-07-19 | End: 2023-07-23

## 2023-07-19 RX ORDER — ZINC OXIDE 13 %
1 CREAM (GRAM) TOPICAL 3 TIMES DAILY
Qty: 30 CAPSULE | Refills: 0 | COMMUNITY
Start: 2023-07-19 | End: 2023-07-29

## 2023-07-19 RX ORDER — BUTALBITAL, ACETAMINOPHEN AND CAFFEINE 50; 325; 40 MG/1; MG/1; MG/1
1 TABLET ORAL EVERY 4 HOURS PRN
Qty: 18 TABLET | Refills: 0 | Status: SHIPPED | OUTPATIENT
Start: 2023-07-19 | End: 2023-07-22

## 2023-07-19 RX ORDER — GUAIFENESIN 600 MG/1
600 TABLET, EXTENDED RELEASE ORAL 2 TIMES DAILY
Qty: 14 TABLET | Refills: 0 | Status: SHIPPED | OUTPATIENT
Start: 2023-07-19 | End: 2023-07-26

## 2023-07-19 RX ADMIN — OXYBUTYNIN CHLORIDE 10 MG: 5 TABLET, EXTENDED RELEASE ORAL at 08:42

## 2023-07-19 RX ADMIN — MOMETASONE FUROATE AND FORMOTEROL FUMARATE DIHYDRATE 2 PUFF: 200; 5 AEROSOL RESPIRATORY (INHALATION) at 07:39

## 2023-07-19 RX ADMIN — LEVALBUTEROL HYDROCHLORIDE 1.25 MG: 1.25 SOLUTION RESPIRATORY (INHALATION) at 07:37

## 2023-07-19 RX ADMIN — SODIUM CHLORIDE, PRESERVATIVE FREE 10 ML: 5 INJECTION INTRAVENOUS at 08:43

## 2023-07-19 RX ADMIN — CITALOPRAM HYDROBROMIDE 40 MG: 20 TABLET ORAL at 08:42

## 2023-07-19 RX ADMIN — METHYLPREDNISOLONE SODIUM SUCCINATE 80 MG: 125 INJECTION, POWDER, FOR SOLUTION INTRAMUSCULAR; INTRAVENOUS at 05:51

## 2023-07-19 RX ADMIN — METOPROLOL TARTRATE 50 MG: 50 TABLET ORAL at 08:43

## 2023-07-19 RX ADMIN — LEVALBUTEROL HYDROCHLORIDE 1.25 MG: 1.25 SOLUTION RESPIRATORY (INHALATION) at 12:10

## 2023-07-19 RX ADMIN — BUSPIRONE HYDROCHLORIDE 10 MG: 5 TABLET ORAL at 08:43

## 2023-07-19 RX ADMIN — MIDODRINE HYDROCHLORIDE 5 MG: 5 TABLET ORAL at 08:43

## 2023-07-19 RX ADMIN — GUAIFENESIN 600 MG: 600 TABLET, EXTENDED RELEASE ORAL at 08:43

## 2023-07-19 RX ADMIN — ENOXAPARIN SODIUM 40 MG: 100 INJECTION SUBCUTANEOUS at 08:42

## 2023-07-19 RX ADMIN — MIDODRINE HYDROCHLORIDE 5 MG: 5 TABLET ORAL at 13:25

## 2023-07-19 RX ADMIN — INSULIN GLARGINE 5 UNITS: 100 INJECTION, SOLUTION SUBCUTANEOUS at 08:43

## 2023-07-19 RX ADMIN — PANTOPRAZOLE SODIUM 40 MG: 40 TABLET, DELAYED RELEASE ORAL at 05:49

## 2023-07-19 NOTE — DISCHARGE INSTR - DIET

## 2023-07-20 ENCOUNTER — FOLLOWUP TELEPHONE ENCOUNTER (OUTPATIENT)
Dept: INPATIENT UNIT | Age: 43
End: 2023-07-20

## 2023-07-20 ENCOUNTER — TELEPHONE (OUTPATIENT)
Dept: FAMILY MEDICINE CLINIC | Age: 43
End: 2023-07-20

## 2023-07-20 NOTE — PROGRESS NOTES
rounding in PCU. Assessment: Patient was sleeping.     07/18/23 1625   Encounter Summary   Encounter Overview/Reason  Attempted Encounter   Service Provided For: Patient   Referral/Consult From: Rounding   Support System Unknown   Last Encounter  07/18/23   Complexity of Encounter Low   Begin Time 1452   End Time  1453   Total Time Calculated 1 min   Spiritual/Emotional needs   Type Spiritual Support   Assessment/Intervention/Outcome   Assessment Unable to assess  (Patient was sleeping.)   Intervention Other (Comment)  (Patient was sleeping.)   Outcome Other (comment)  (Patient was sleeping.)   Plan and Referrals   Plan/Referrals Continue to visit, (comment)         Plan: Chaplains are available on site or on call 24/7 for spiritual and emotional support.     Electronically signed by Gail Santamaria on 7/18/2023 at 4:26 PM
07/19/23 1144   Encounter Summary   Encounter Overview/Reason  Attempted Encounter   Service Provided For: Patient   Referral/Consult From: Aram   Last Encounter  07/19/23  (pt out for test.)   Begin Time 1143   End Time  1145   Total Time Calculated 2 min
CLINICAL PHARMACY NOTE: MEDS TO BEDS    Total # of Prescriptions Filled: 5   The following medications were delivered to the patient:  Mucus Relief 600mg  Cefdinir 300mg  Azithromycin 500mg  Prednisone 20mg  Butalbital/APAP/Caff 50/325/40mg    Additional Documentation:
Home Oxygen Evaluation    Home Oxygen evaluation Results completed. Room air SpO2    At rest 86%  With exercise/exertion NA    SpO2 on prescribed O2 level at 2 LPM    At rest 96%  With exercise/exertion 93%    Nocturnal Oximetry with patient on room air recommended if the resting SpO2 is 89% to 95%.  (Requires additional order)    Emir Guallpa RCP   11:34 AM
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 Wanted Monroe Hurley MDI--given Rx, but unafordable ---> cont'd Albuterol    Tobacco abuse---cont'd cessation--nicotine patch    See note below     All other ROS negative except noted in HPI    Diet:  ADULT DIET; Regular; 4 carb choices (60 gm/meal);  Low Fat/Low Chol/High Fiber/DEUCE; may have regular pepsi    Medications:  Scheduled Meds:   Vitamin D  2,000 Units Oral Nightly    insulin glargine  5 Units SubCUTAneous Daily    insulin lispro  0-4 Units SubCUTAneous TID WC    insulin lispro  0-4 Units SubCUTAneous Nightly    guaiFENesin  600 mg Oral BID    busPIRone  10 mg Oral BID    citalopram  40 mg Oral Daily    fluticasone  1 spray Each Nostril BID    metoprolol tartrate  50 mg Oral BID    midodrine  5 mg Oral TID WC    montelukast  10 mg Oral Nightly    multivitamin  1 tablet Oral Daily    pantoprazole  40 mg Oral QAM AC    oxybutynin  10 mg Oral Daily    atorvastatin  10 mg Oral Daily    enoxaparin  40 mg SubCUTAneous Daily    azithromycin  500 mg Oral Daily    mometasone-formoterol  2 puff Inhalation BID    methylPREDNISolone  80 mg IntraVENous Q12H    sodium chloride flush  5-40 mL IntraVENous BID    nicotine  1 patch TransDERmal Daily    cefTRIAXone (ROCEPHIN) IV  1,000 mg IntraVENous Q24H    levalbuterol  1.25 mg Nebulization Q4H WA     Continuous Infusions:   dextrose       PRN Meds:butalbital-acetaminophen-caffeine, glucose, dextrose bolus **OR** dextrose bolus, glucagon (rDNA), dextrose, ipratropium 0.5 mg-albuterol 2.5 mg, traZODone, ondansetron **OR**
Guarding or Rebound Tenderness  : Not examined  EXT/Skin: No Edema, No Cyanosis, and No Clubbing  Neuro: Alert and Oriented and No Localizing Signs/Symptoms---subjective complaints of headache--photophobia      Assessment:    Principal Problem:    Acute exacerbation of chronic obstructive pulmonary disease (COPD) (HCC)  Resolved Problems:    * No resolved hospital problems. *    Alejandro Bryant.  37  WF  Carrie Shankar, PAC--FP]  FULL CODE   LOVENOX    SUPPLEMENTAL OXYGEN---hospital only  COVID-19--NEGATIVE    Anti-infectives:   Rocephin IV, Zithromax IV        [Solumedrol IV]  [Xopenex]    COPD exacerbation----7.17.2023         CXR---7.18.1013---prominent interstitial markings--chronic fibrotic change         CXR---7.16.2023--increased interstitial opacity--chronic?          EKG---7.16.2023---NSR--low voltage         EKG----7.22.2022---sinus tachycardia---106--LAE  Pulmonary fibrosis  Chronic fatigue    HTN   Neurocardiogenic syncope--history   Hyperlipidemia   GERD  CKD----Stage 2 or better  Migraine headache  Mixed anxiety--depressive disorder--insomnia   Prior substance abuse---crack cocaine---quit--c. 1999  Tobacco abuse--1 PPD--quit---1.1.1998--7.13.2023  PMH:    atrophic vaginitis, chronic fatigue, constipation, endometriosis, genital herpes,                ovarian cyst, PID--pelvic inflammatory disease, pneumonia---2022, chronic                sinusitis, Vitamin D deficiency, COPD exacerbation--7.22.2022--acute bronchitis                superposed on chronic bronchitis, atrophic vaginitis, closed distal left radius fracture,                 migraines  PSH:    [id number], appendectomy,  wisdom tooth extraction, laparoscopy, UTIs    Allergies:  PCN--amoxicillin      Plan:    COPD exacerbation--cont'd antibiotics--med nebs---wean O2 as tolerated---IV steroids    Migraine headache---Fioricet    See orders    Electronically signed by Saima Marlow MD on 7/18/2023 at 11:39 AM    Hospitalist

## 2023-07-20 NOTE — TELEPHONE ENCOUNTER
Care Transitions Initial Follow Up Call    Outreach made within 2 business days of discharge: Yes    Patient: Emily Cohen Patient : 1980   MRN: [id number]  Reason for Admission: copd  Discharge Date: 23       Spoke with: Patient     Discharge department/facility: [facility]    TCM Interactive Patient Contact:  Was patient able to fill all prescriptions: Yes  Was patient instructed to bring all medications to the follow-up visit: Yes  Is patient taking all medications as directed in the discharge summary?  Yes  Does patient understand their discharge instructions: Yes  Does patient have questions or concerns that need addressed prior to 7-14 day follow up office visit: no    Scheduled appointment with PCP within 7-14 days    Follow Up  Future Appointments   Date Time Provider [facility] [address]   8/3/2023 11:20 AM Liliana Lemon DPP   2023  4[facility]00 [facility]       Art Stringer, CMA

## 2023-07-20 NOTE — DISCHARGE SUMMARY
b.i.d.; ipratropium/albuterol med-neb  treatments (DuoNeb) every 4 hours scheduled; oxygen 2 liters by nasal  cannula continuously, may adjust to keep oxygen saturation 90% to 95%;  prednisone 40 mg p.o. daily, 4 days, then stop; probiotic one p.o.  morning, noon and evening and at bedtime daily. FOLLOWING MEDICATIONS FOR WHICH CHANGES MAY HAVE OCCURRED:  Albuterol  sulfate HFA, 2 puffs four times a day p.r.n. shortness of breath,  wheezing or Xopenex MDI, 2 puffs every 4 hours and p.r.n. FOLLOWING MEDICATIONS CONTINUED:  Budesonide/formoterol (Symbicort)  160/4.5 one inhalation twice daily.     FOLLOWING MEDICATIONS CONTINUED WITHOUT CHANGE:  BuSpar (buspirone) 10  mg p.o. b.i.d.; citalopram (Celexa) 40 mg p.o. q.a.m.; flunisolide  (Nasalide) 25 mcg per activation, 0.025% solution, 2 sprays, both  nostrils daily; metoprolol tartrate (Lopressor) 100 mg p.o. twice daily;  midodrine (ProAmatine) 5 mg p.o. three times a day; montelukast  (Singulair) 10 mg p.o. q.p.m.; multivitamin one p.o. daily; nicotine 21  mg per 24-hour patch, topical, change daily, advised may not smoke with  the patch in place; omeprazole (Prilosec) 40 mg p.o. q.a.m. and at  bedtime; oxybutynin (Ditropan XL) 10 mg p.o. daily; simvastatin (Zocor)  20 mg p.o. nightly; trazodone (Desyrel) 50 mg p.o. nightly p.r.n. sleep;  vitamin D 1000 units _____ ***** DICTATION ENDS HERE *****          Alexandre Stewart MD    D: 07/19/2023 16:48:14       T: 07/19/2023 16:53:11     [email or web address]  Job#: [id number]     Doc#: [id number]    CC:

## 2023-07-21 ENCOUNTER — OFFICE VISIT (OUTPATIENT)
Dept: FAMILY MEDICINE CLINIC | Age: 43
End: 2023-07-21

## 2023-07-21 VITALS
WEIGHT: 149 LBS | HEART RATE: 70 BPM | DIASTOLIC BLOOD PRESSURE: 62 MMHG | HEIGHT: 61 IN | OXYGEN SATURATION: 98 % | BODY MASS INDEX: 28.13 KG/M2 | SYSTOLIC BLOOD PRESSURE: 122 MMHG

## 2023-07-21 DIAGNOSIS — R06.00 DYSPNEA, UNSPECIFIED TYPE: ICD-10-CM

## 2023-07-21 DIAGNOSIS — F51.04 PSYCHOPHYSIOLOGICAL INSOMNIA: ICD-10-CM

## 2023-07-21 DIAGNOSIS — R09.02 HYPOXIA: ICD-10-CM

## 2023-07-21 DIAGNOSIS — J44.1 ACUTE EXACERBATION OF CHRONIC OBSTRUCTIVE PULMONARY DISEASE (COPD) (HCC): Primary | ICD-10-CM

## 2023-07-21 DIAGNOSIS — R55 NEUROCARDIOGENIC SYNCOPE: ICD-10-CM

## 2023-07-21 LAB
MICROORGANISM SPEC CULT: NORMAL
MICROORGANISM SPEC CULT: NORMAL
SERVICE CMNT-IMP: NORMAL
SERVICE CMNT-IMP: NORMAL
SPECIMEN DESCRIPTION: NORMAL
SPECIMEN DESCRIPTION: NORMAL

## 2023-07-21 RX ORDER — PREDNISONE 10 MG/1
10 TABLET ORAL 2 TIMES DAILY
Qty: 10 TABLET | Refills: 0 | Status: SHIPPED | OUTPATIENT
Start: 2023-07-21 | End: 2023-07-26

## 2023-07-21 RX ORDER — FLUTICASONE FUROATE, UMECLIDINIUM BROMIDE AND VILANTEROL TRIFENATATE 100; 62.5; 25 UG/1; UG/1; UG/1
1 POWDER RESPIRATORY (INHALATION) DAILY
Qty: 2 EACH | Refills: 0 | COMMUNITY
Start: 2023-07-21

## 2023-07-21 RX ORDER — QUETIAPINE FUMARATE 25 MG/1
25 TABLET, FILM COATED ORAL NIGHTLY PRN
Qty: 30 TABLET | Refills: 2 | Status: SHIPPED | OUTPATIENT
Start: 2023-07-21

## 2023-07-21 RX ORDER — FLUDROCORTISONE ACETATE 0.1 MG/1
0.1 TABLET ORAL DAILY
Qty: 90 TABLET | Refills: 0 | Status: SHIPPED | OUTPATIENT
Start: 2023-07-21 | End: 2023-10-19

## 2023-07-21 NOTE — PROGRESS NOTES
Adjust to keep sat 90-95%. DX: J44.1 1 each 0    levalbuterol (XOPENEX HFA) 45 MCG/ACT inhaler Inhale 2 puffs into the lungs every 4 hours Use every 4 hours and as needed. Do not use Albuterol inhaler if you are using this.  1 each 0    simvastatin (ZOCOR) 20 MG tablet TAKE ONE TABLET BY MOUTH ONCE NIGHTLY 90 tablet 1    montelukast (SINGULAIR) 10 MG tablet TAKE ONE TABLET BY MOUTH EVERY EVENING 90 tablet 0    albuterol sulfate HFA (VENTOLIN HFA) 108 (90 Base) MCG/ACT inhaler Inhale 2 puffs into the lungs 4 times daily as needed for Wheezing 18 g 0    omeprazole (PRILOSEC) 40 MG delayed release capsule Take 1 capsule by mouth in the morning and at bedtime 180 capsule 1    metoprolol tartrate (LOPRESSOR) 25 MG tablet Take 2 tablets by mouth 2 times daily 360 tablet 1    busPIRone (BUSPAR) 10 MG tablet TAKE ONE TABLET BY MOUTH TWICE A  tablet 1    citalopram (CELEXA) 40 MG tablet TAKE ONE TABLET BY MOUTH EVERY MORNING 90 tablet 1    midodrine (PROAMATINE) 5 MG tablet TAKE ONE TABLET BY MOUTH THREE TIMES A  tablet 1    oxybutynin (DITROPAN XL) 10 MG extended release tablet Take 1 tablet by mouth daily 90 tablet 3    [DISCONTINUED] traZODone (DESYREL) 50 MG tablet Take 1 tablet by mouth nightly as needed for Sleep 90 tablet 1    ipratropium-albuterol (DUONEB) 0.5-2.5 (3) MG/3ML SOLN nebulizer solution Inhale 3 mLs into the lungs every 4 hours 360 mL 2    Nebulizers (COMPRESSOR/NEBULIZER) MISC With tubing and supplies 1 each 3    flunisolide (NASALIDE) 25 MCG/ACT (0.025%) SOLN 2 sprays into both nostrils once a day 1 each 1    nicotine (NICODERM CQ) 21 MG/24HR Place 1 patch onto the skin every 24 hours 45 patch 0    [DISCONTINUED] budesonide-formoterol (SYMBICORT) 160-4.5 MCG/ACT AERO Inhale 1 puff into the lungs 2 times daily 10.2 g 3    Spacer/Aero-Holding Chambers MARIA DE JESUS 1 Device by Does not apply route as needed (To be used with albuterol inhaler) (Patient not taking: Reported on 6/19/2023) 1 each 0

## 2023-07-21 NOTE — PATIENT INSTRUCTIONS
Start the prednisone I sent in today once you complete the 20mg regimen. Finish the antibiotics   Use albuterol as needed  Continue with trial runs without oxygen. Check pulse ox to make sure your oxygen is staying above 90%. Samples of trelegy given, lets try before sending in script. We will keep you going back to work on 7/26/23, if you feel you cannot, please let us know. Trial fun of seroquel (sleep aid) call next week if you feel it does not help  Repeat cbc (blood test) in one week.

## 2023-07-22 ASSESSMENT — ENCOUNTER SYMPTOMS
GASTROINTESTINAL NEGATIVE: 1
COUGH: 1
SHORTNESS OF BREATH: 1
CHEST TIGHTNESS: 1

## 2023-08-14 ENCOUNTER — OFFICE VISIT (OUTPATIENT)
Dept: PRIMARY CARE CLINIC | Age: 43
End: 2023-08-14
Payer: COMMERCIAL

## 2023-08-14 ENCOUNTER — HOSPITAL ENCOUNTER (OUTPATIENT)
Dept: GENERAL RADIOLOGY | Age: 43
Discharge: HOME | End: 2023-08-16
Payer: COMMERCIAL

## 2023-08-14 VITALS
DIASTOLIC BLOOD PRESSURE: 74 MMHG | BODY MASS INDEX: 28 KG/M2 | HEART RATE: 70 BPM | SYSTOLIC BLOOD PRESSURE: 120 MMHG | WEIGHT: 148.2 LBS | OXYGEN SATURATION: 87 % | TEMPERATURE: 98.3 F

## 2023-08-14 DIAGNOSIS — R09.02 HYPOXIA: Primary | ICD-10-CM

## 2023-08-14 DIAGNOSIS — G43.009 MIGRAINE WITHOUT AURA AND WITHOUT STATUS MIGRAINOSUS, NOT INTRACTABLE: ICD-10-CM

## 2023-08-14 DIAGNOSIS — R09.02 HYPOXIA: ICD-10-CM

## 2023-08-14 DIAGNOSIS — J44.1 COPD EXACERBATION (HCC): ICD-10-CM

## 2023-08-14 PROCEDURE — 96372 THER/PROPH/DIAG INJ SC/IM: CPT | Performed by: FAMILY MEDICINE

## 2023-08-14 PROCEDURE — 99214 OFFICE O/P EST MOD 30 MIN: CPT | Performed by: FAMILY MEDICINE

## 2023-08-14 PROCEDURE — 71046 X-RAY EXAM CHEST 2 VIEWS: CPT

## 2023-08-14 RX ORDER — KETOROLAC TROMETHAMINE 30 MG/ML
30 INJECTION, SOLUTION INTRAMUSCULAR; INTRAVENOUS ONCE
Status: DISCONTINUED | OUTPATIENT
Start: 2023-08-14 | End: 2023-08-14

## 2023-08-14 RX ORDER — METHYLPREDNISOLONE 4 MG/1
TABLET ORAL
Qty: 1 KIT | Refills: 0 | Status: SHIPPED | OUTPATIENT
Start: 2023-08-14

## 2023-08-14 RX ORDER — PROMETHAZINE HYDROCHLORIDE 25 MG/ML
25 INJECTION, SOLUTION INTRAMUSCULAR; INTRAVENOUS ONCE
Status: COMPLETED | OUTPATIENT
Start: 2023-08-14 | End: 2023-08-14

## 2023-08-14 RX ORDER — KETOROLAC TROMETHAMINE 30 MG/ML
30 INJECTION, SOLUTION INTRAMUSCULAR; INTRAVENOUS ONCE
Status: COMPLETED | OUTPATIENT
Start: 2023-08-14 | End: 2023-08-14

## 2023-08-14 RX ORDER — KETOROLAC TROMETHAMINE 30 MG/ML
30 INJECTION, SOLUTION INTRAMUSCULAR; INTRAVENOUS ONCE
Qty: 1 ML | Refills: 0
Start: 2023-08-14 | End: 2023-08-14 | Stop reason: CLARIF

## 2023-08-14 RX ADMIN — KETOROLAC TROMETHAMINE 30 MG: 30 INJECTION, SOLUTION INTRAMUSCULAR; INTRAVENOUS at 14:57

## 2023-08-14 RX ADMIN — PROMETHAZINE HYDROCHLORIDE 25 MG: 25 INJECTION, SOLUTION INTRAMUSCULAR; INTRAVENOUS at 14:37

## 2023-08-14 ASSESSMENT — ENCOUNTER SYMPTOMS
RHINORRHEA: 0
COUGH: 1
VOMITING: 0
EYES NEGATIVE: 1
NAUSEA: 1
SORE THROAT: 0
SHORTNESS OF BREATH: 1
WHEEZING: 1

## 2023-08-14 ASSESSMENT — PATIENT HEALTH QUESTIONNAIRE - PHQ9
SUM OF ALL RESPONSES TO PHQ QUESTIONS 1-9: 0
SUM OF ALL RESPONSES TO PHQ9 QUESTIONS 1 & 2: 0
2. FEELING DOWN, DEPRESSED OR HOPELESS: 0
SUM OF ALL RESPONSES TO PHQ QUESTIONS 1-9: 0
1. LITTLE INTEREST OR PLEASURE IN DOING THINGS: 0
SUM OF ALL RESPONSES TO PHQ QUESTIONS 1-9: 0
SUM OF ALL RESPONSES TO PHQ QUESTIONS 1-9: 0

## 2023-08-14 NOTE — PROGRESS NOTES
Subjective:      Patient ID: Cabrera Castanon is a 37 y.o. female. HPI  acute walk in clinic visit for suspicion of copd exacerbation. Cough and sob, mild wheezing. No prodromal cold symptoms. Seasonal allergies history, but no current rhinorrhea, sneezing or nasal/eye congestion. Supplemental oxygen on today. She uses this more prn . Not tolerating it at night. No fever. Compliant with trelegy inhaler. Using albuterol 1-2 x /day on prn basis at present.       Past Medical History:   Diagnosis Date    Atrophic vaginitis 03/10/2016    Chronic fatigue     Constipation     Endometriosis     h/o endometriosis for which she had a hysterectomy    Genital herpes     Hyperlipidemia     Migraine     migraine cephalgia    Neurocardiogenic syncope     Ovarian cyst     Pelvic inflammatory disease (PID)     Pneumonia due to infectious organism     Sinusitis, chronic     chronic sinusitis probably related to tobacco abuse    Tobacco abuse     Tobacco use disorder     Vitamin D deficiency      Past Surgical History:   Procedure Laterality Date    APPENDECTOMY      CATARACT REMOVAL WITH IMPLANT Bilateral 01/23/2002    LAPAROSCOPY      X3 before the hysterectomy    OTHER SURGICAL HISTORY  2016    lump removed lower right leg    KRISTIN AND BSO (CERVIX REMOVED)  2006    total with bilateral salpingo-oophorectomy    WISDOM TOOTH EXTRACTION       Current Outpatient Medications   Medication Sig Dispense Refill    fludrocortisone (FLORINEF) 0.1 MG tablet Take 1 tablet by mouth daily 90 tablet 0    QUEtiapine (SEROQUEL) 25 MG tablet Take 1 tablet by mouth nightly as needed for Other (insomnia) 30 tablet 2    fluticasone-umeclidin-vilant (TRELEGY ELLIPTA) 100-62.5-25 MCG/ACT AEPB inhaler Inhale 1 puff into the lungs daily 2 each 0    UNABLE TO FIND Pulse Oximeter 1 each 0    OXYGEN Inhale 2 L/hr into the lungs continuous Adjust to keep sat 90-95%  DX: J44.1 3 Container 0    Oxygen Concentrator 2 liters continuous and ambulatory,

## 2023-08-22 DIAGNOSIS — J44.1 ACUTE EXACERBATION OF CHRONIC OBSTRUCTIVE PULMONARY DISEASE (COPD) (HCC): ICD-10-CM

## 2023-08-22 RX ORDER — FLUTICASONE FUROATE, UMECLIDINIUM BROMIDE AND VILANTEROL TRIFENATATE 100; 62.5; 25 UG/1; UG/1; UG/1
1 POWDER RESPIRATORY (INHALATION) DAILY
Qty: 2 EACH | Refills: 0 | Status: SHIPPED | OUTPATIENT
Start: 2023-08-22

## 2023-08-22 NOTE — TELEPHONE ENCOUNTER
----- Message from Queenie Muñoz sent at 8/21/2023  4:33 PM EDT -----  Subject: Refill Request    QUESTIONS  Name of Medication? Other - trelegy  Patient-reported dosage and instructions? once a day  How many days do you have left? 2  Preferred Pharmacy? [facility] [id number]  Pharmacy phone number (if available)? [phone]  ---------------------------------------------------------------------------  --------------  CALL BACK INFO  What is the best way for the office to contact you? OK to leave message on   voicemail  Preferred Call Back Phone Number? [phone]  ---------------------------------------------------------------------------  --------------  SCRIPT ANSWERS  Relationship to Patient?  Self

## 2023-08-23 ENCOUNTER — APPOINTMENT (OUTPATIENT)
Dept: GENERAL RADIOLOGY | Age: 43
End: 2023-08-23
Payer: COMMERCIAL

## 2023-08-23 ENCOUNTER — HOSPITAL ENCOUNTER (EMERGENCY)
Age: 43
Discharge: HOME | End: 2023-08-23
Attending: EMERGENCY MEDICINE
Payer: COMMERCIAL

## 2023-08-23 ENCOUNTER — OFFICE VISIT (OUTPATIENT)
Dept: PRIMARY CARE CLINIC | Age: 43
End: 2023-08-23
Payer: COMMERCIAL

## 2023-08-23 VITALS
BODY MASS INDEX: 27.94 KG/M2 | TEMPERATURE: 99.4 F | SYSTOLIC BLOOD PRESSURE: 130 MMHG | WEIGHT: 148 LBS | OXYGEN SATURATION: 93 % | HEART RATE: 100 BPM | HEIGHT: 61 IN | DIASTOLIC BLOOD PRESSURE: 70 MMHG | RESPIRATION RATE: 30 BRPM

## 2023-08-23 VITALS
RESPIRATION RATE: 22 BRPM | WEIGHT: 149 LBS | HEART RATE: 94 BPM | OXYGEN SATURATION: 100 % | HEIGHT: 64 IN | SYSTOLIC BLOOD PRESSURE: 121 MMHG | DIASTOLIC BLOOD PRESSURE: 76 MMHG | TEMPERATURE: 100 F | BODY MASS INDEX: 25.44 KG/M2

## 2023-08-23 DIAGNOSIS — R06.02 SHORTNESS OF BREATH: Primary | ICD-10-CM

## 2023-08-23 DIAGNOSIS — J44.1 COPD EXACERBATION (HCC): ICD-10-CM

## 2023-08-23 DIAGNOSIS — R53.1 WEAKNESS: ICD-10-CM

## 2023-08-23 DIAGNOSIS — J18.9 PNEUMONIA DUE TO INFECTIOUS ORGANISM, UNSPECIFIED LATERALITY, UNSPECIFIED PART OF LUNG: Primary | ICD-10-CM

## 2023-08-23 LAB
ANION GAP SERPL CALCULATED.3IONS-SCNC: 10 MMOL/L (ref 9–17)
BASOPHILS # BLD: 0.15 K/UL (ref 0–0.2)
BASOPHILS NFR BLD: 1 % (ref 0–2)
BUN SERPL-MCNC: 13 MG/DL (ref 6–20)
BUN/CREAT SERPL: 16 (ref 9–20)
CALCIUM SERPL-MCNC: 9.2 MG/DL (ref 8.6–10.4)
CHLORIDE SERPL-SCNC: 101 MMOL/L (ref 98–107)
CO2 SERPL-SCNC: 27 MMOL/L (ref 20–31)
CREAT SERPL-MCNC: 0.8 MG/DL (ref 0.5–0.9)
EOSINOPHIL # BLD: 0.82 K/UL (ref 0–0.44)
EOSINOPHILS RELATIVE PERCENT: 5 % (ref 1–4)
ERYTHROCYTE [DISTWIDTH] IN BLOOD BY AUTOMATED COUNT: 13.2 % (ref 11.8–14.4)
GFR SERPL CREATININE-BSD FRML MDRD: >60 ML/MIN/1.73M2
GLUCOSE SERPL-MCNC: 109 MG/DL (ref 70–99)
HCT VFR BLD AUTO: 37.9 % (ref 36.3–47.1)
HGB BLD-MCNC: 12.3 G/DL (ref 11.9–15.1)
IMM GRANULOCYTES # BLD AUTO: 0.07 K/UL (ref 0–0.3)
IMM GRANULOCYTES NFR BLD: 0 %
LYMPHOCYTES NFR BLD: 2.19 K/UL (ref 1.1–3.7)
LYMPHOCYTES RELATIVE PERCENT: 13 % (ref 24–43)
Lab: NORMAL
MCH RBC QN AUTO: 30.4 PG (ref 25.2–33.5)
MCHC RBC AUTO-ENTMCNC: 32.5 G/DL (ref 25.2–33.5)
MCV RBC AUTO: 93.6 FL (ref 82.6–102.9)
MONOCYTES NFR BLD: 1.34 K/UL (ref 0.1–1.2)
MONOCYTES NFR BLD: 8 % (ref 3–12)
NEUTROPHILS NFR BLD: 73 % (ref 36–65)
NEUTS SEG NFR BLD: 11.73 K/UL (ref 1.5–8.1)
NRBC BLD-RTO: 0 PER 100 WBC
PLATELET # BLD AUTO: 343 K/UL (ref 138–453)
PMV BLD AUTO: 9.6 FL (ref 8.1–13.5)
POTASSIUM SERPL-SCNC: 4 MMOL/L (ref 3.7–5.3)
QC PASS/FAIL: NORMAL
RBC # BLD AUTO: 4.05 M/UL (ref 3.95–5.11)
SARS-COV-2 RDRP RESP QL NAA+PROBE: NEGATIVE
SODIUM SERPL-SCNC: 138 MMOL/L (ref 135–144)
WBC OTHER # BLD: 16.3 K/UL (ref 3.5–11.3)

## 2023-08-23 PROCEDURE — 99284 EMERGENCY DEPT VISIT MOD MDM: CPT

## 2023-08-23 PROCEDURE — 6370000000 HC RX 637 (ALT 250 FOR IP): Performed by: EMERGENCY MEDICINE

## 2023-08-23 PROCEDURE — 36415 COLL VENOUS BLD VENIPUNCTURE: CPT

## 2023-08-23 PROCEDURE — 80048 BASIC METABOLIC PNL TOTAL CA: CPT

## 2023-08-23 PROCEDURE — 85025 COMPLETE CBC W/AUTO DIFF WBC: CPT

## 2023-08-23 PROCEDURE — 87635 SARS-COV-2 COVID-19 AMP PRB: CPT

## 2023-08-23 PROCEDURE — 71045 X-RAY EXAM CHEST 1 VIEW: CPT

## 2023-08-23 PROCEDURE — 94640 AIRWAY INHALATION TREATMENT: CPT

## 2023-08-23 PROCEDURE — 99213 OFFICE O/P EST LOW 20 MIN: CPT

## 2023-08-23 RX ORDER — IBUPROFEN 800 MG/1
800 TABLET ORAL ONCE
Status: COMPLETED | OUTPATIENT
Start: 2023-08-23 | End: 2023-08-23

## 2023-08-23 RX ORDER — AZITHROMYCIN 250 MG/1
TABLET, FILM COATED ORAL
Qty: 1 PACKET | Refills: 0 | Status: SHIPPED | OUTPATIENT
Start: 2023-08-23 | End: 2023-08-27

## 2023-08-23 RX ORDER — AZITHROMYCIN 250 MG/1
500 TABLET, FILM COATED ORAL ONCE
Status: COMPLETED | OUTPATIENT
Start: 2023-08-23 | End: 2023-08-23

## 2023-08-23 RX ORDER — IPRATROPIUM BROMIDE AND ALBUTEROL SULFATE 2.5; .5 MG/3ML; MG/3ML
1 SOLUTION RESPIRATORY (INHALATION) ONCE
Status: COMPLETED | OUTPATIENT
Start: 2023-08-23 | End: 2023-08-23

## 2023-08-23 RX ADMIN — IPRATROPIUM BROMIDE AND ALBUTEROL SULFATE 1 DOSE: .5; 2.5 SOLUTION RESPIRATORY (INHALATION) at 21:05

## 2023-08-23 RX ADMIN — IBUPROFEN 800 MG: 800 TABLET, FILM COATED ORAL at 22:12

## 2023-08-23 RX ADMIN — AZITHROMYCIN MONOHYDRATE 500 MG: 250 TABLET ORAL at 22:12

## 2023-08-23 ASSESSMENT — ENCOUNTER SYMPTOMS
SHORTNESS OF BREATH: 1
DIARRHEA: 0
CONSTIPATION: 0
ABDOMINAL PAIN: 0
VOMITING: 0
SORE THROAT: 0
NAUSEA: 0
ALLERGIC/IMMUNOLOGIC NEGATIVE: 1
EYES NEGATIVE: 1
CHEST TIGHTNESS: 0
COUGH: 1

## 2023-08-23 ASSESSMENT — PAIN - FUNCTIONAL ASSESSMENT: PAIN_FUNCTIONAL_ASSESSMENT: 0-10

## 2023-08-23 ASSESSMENT — LIFESTYLE VARIABLES
HOW OFTEN DO YOU HAVE A DRINK CONTAINING ALCOHOL: 2-4 TIMES A MONTH
HOW MANY STANDARD DRINKS CONTAINING ALCOHOL DO YOU HAVE ON A TYPICAL DAY: 1 OR 2

## 2023-08-23 ASSESSMENT — PAIN SCALES - GENERAL: PAINLEVEL_OUTOF10: 7

## 2023-08-23 ASSESSMENT — PAIN DESCRIPTION - LOCATION: LOCATION: HEAD

## 2023-08-23 NOTE — PROGRESS NOTES
exercise. Patient agreed with treatment plan. Follow up asdirected.      Electronically signed by OSEI Stevens CNP on 8/23/2023 at 8:15 PM

## 2023-09-22 ENCOUNTER — OFFICE VISIT (OUTPATIENT)
Dept: FAMILY MEDICINE CLINIC | Age: 43
End: 2023-09-22
Payer: COMMERCIAL

## 2023-09-22 VITALS
OXYGEN SATURATION: 96 % | HEIGHT: 64 IN | HEART RATE: 87 BPM | BODY MASS INDEX: 25.68 KG/M2 | WEIGHT: 150.4 LBS | DIASTOLIC BLOOD PRESSURE: 74 MMHG | RESPIRATION RATE: 18 BRPM | SYSTOLIC BLOOD PRESSURE: 110 MMHG

## 2023-09-22 DIAGNOSIS — J40 BRONCHITIS: ICD-10-CM

## 2023-09-22 DIAGNOSIS — J32.4 CHRONIC PANSINUSITIS: ICD-10-CM

## 2023-09-22 DIAGNOSIS — R39.9 LOWER URINARY TRACT SYMPTOMS: ICD-10-CM

## 2023-09-22 DIAGNOSIS — J44.1 ACUTE EXACERBATION OF CHRONIC OBSTRUCTIVE PULMONARY DISEASE (COPD) (HCC): Primary | ICD-10-CM

## 2023-09-22 DIAGNOSIS — K21.9 GASTROESOPHAGEAL REFLUX DISEASE, UNSPECIFIED WHETHER ESOPHAGITIS PRESENT: ICD-10-CM

## 2023-09-22 DIAGNOSIS — F41.8 ANXIETY WITH DEPRESSION: ICD-10-CM

## 2023-09-22 DIAGNOSIS — R55 NEUROCARDIOGENIC SYNCOPE: ICD-10-CM

## 2023-09-22 DIAGNOSIS — L30.9 DERMATITIS: ICD-10-CM

## 2023-09-22 PROCEDURE — 99214 OFFICE O/P EST MOD 30 MIN: CPT | Performed by: NURSE PRACTITIONER

## 2023-09-22 RX ORDER — BLOOD-GLUCOSE METER
KIT MISCELLANEOUS
Qty: 1 EACH | Refills: 0 | Status: SHIPPED | OUTPATIENT
Start: 2023-09-22

## 2023-09-22 RX ORDER — DESONIDE 0.5 MG/G
CREAM TOPICAL
Qty: 15 G | Refills: 2 | Status: SHIPPED | OUTPATIENT
Start: 2023-09-22

## 2023-09-22 RX ORDER — CITALOPRAM 40 MG/1
TABLET ORAL
Qty: 90 TABLET | Refills: 1 | Status: SHIPPED | OUTPATIENT
Start: 2023-09-22

## 2023-09-22 RX ORDER — MONTELUKAST SODIUM 10 MG/1
10 TABLET ORAL NIGHTLY
Qty: 90 TABLET | Refills: 0 | Status: SHIPPED | OUTPATIENT
Start: 2023-09-22

## 2023-09-22 RX ORDER — BUSPIRONE HYDROCHLORIDE 10 MG/1
TABLET ORAL
Qty: 180 TABLET | Refills: 1 | Status: SHIPPED | OUTPATIENT
Start: 2023-09-22

## 2023-09-22 RX ORDER — ALBUTEROL SULFATE 90 UG/1
2 AEROSOL, METERED RESPIRATORY (INHALATION) 4 TIMES DAILY PRN
Qty: 18 G | Refills: 0 | Status: SHIPPED | OUTPATIENT
Start: 2023-09-22

## 2023-09-22 RX ORDER — MIDODRINE HYDROCHLORIDE 5 MG/1
TABLET ORAL
Qty: 270 TABLET | Refills: 1 | Status: SHIPPED | OUTPATIENT
Start: 2023-09-22

## 2023-09-22 RX ORDER — OMEPRAZOLE 40 MG/1
40 CAPSULE, DELAYED RELEASE ORAL 2 TIMES DAILY
Qty: 180 CAPSULE | Refills: 1 | Status: SHIPPED | OUTPATIENT
Start: 2023-09-22

## 2023-09-22 RX ORDER — ALBUTEROL SULFATE 1.25 MG/3ML
1 SOLUTION RESPIRATORY (INHALATION) EVERY 6 HOURS PRN
Qty: 360 ML | Refills: 3 | Status: SHIPPED | OUTPATIENT
Start: 2023-09-22

## 2023-09-22 RX ORDER — FAMOTIDINE 40 MG/1
40 TABLET, FILM COATED ORAL NIGHTLY
Qty: 30 TABLET | Refills: 2 | Status: SHIPPED | OUTPATIENT
Start: 2023-09-22

## 2023-09-22 RX ORDER — OXYBUTYNIN CHLORIDE 10 MG/1
10 TABLET, EXTENDED RELEASE ORAL DAILY
Qty: 90 TABLET | Refills: 3 | Status: SHIPPED | OUTPATIENT
Start: 2023-09-22

## 2023-09-22 ASSESSMENT — PATIENT HEALTH QUESTIONNAIRE - PHQ9
SUM OF ALL RESPONSES TO PHQ9 QUESTIONS 1 & 2: 0
5. POOR APPETITE OR OVEREATING: 1
10. IF YOU CHECKED OFF ANY PROBLEMS, HOW DIFFICULT HAVE THESE PROBLEMS MADE IT FOR YOU TO DO YOUR WORK, TAKE CARE OF THINGS AT HOME, OR GET ALONG WITH OTHER PEOPLE: 1
8. MOVING OR SPEAKING SO SLOWLY THAT OTHER PEOPLE COULD HAVE NOTICED. OR THE OPPOSITE, BEING SO FIGETY OR RESTLESS THAT YOU HAVE BEEN MOVING AROUND A LOT MORE THAN USUAL: 0
SUM OF ALL RESPONSES TO PHQ QUESTIONS 1-9: 3
SUM OF ALL RESPONSES TO PHQ QUESTIONS 1-9: 3
6. FEELING BAD ABOUT YOURSELF - OR THAT YOU ARE A FAILURE OR HAVE LET YOURSELF OR YOUR FAMILY DOWN: 0
2. FEELING DOWN, DEPRESSED OR HOPELESS: 0
SUM OF ALL RESPONSES TO PHQ QUESTIONS 1-9: 3
1. LITTLE INTEREST OR PLEASURE IN DOING THINGS: 0
3. TROUBLE FALLING OR STAYING ASLEEP: 1
SUM OF ALL RESPONSES TO PHQ QUESTIONS 1-9: 3
4. FEELING TIRED OR HAVING LITTLE ENERGY: 1
7. TROUBLE CONCENTRATING ON THINGS, SUCH AS READING THE NEWSPAPER OR WATCHING TELEVISION: 0
9. THOUGHTS THAT YOU WOULD BE BETTER OFF DEAD, OR OF HURTING YOURSELF: 0

## 2023-09-25 ASSESSMENT — ENCOUNTER SYMPTOMS
ABDOMINAL PAIN: 0
DIARRHEA: 0
SHORTNESS OF BREATH: 0
BLOOD IN STOOL: 0
WHEEZING: 1
CONSTIPATION: 0

## 2023-10-18 DIAGNOSIS — J44.1 ACUTE EXACERBATION OF CHRONIC OBSTRUCTIVE PULMONARY DISEASE (COPD) (HCC): ICD-10-CM

## 2023-10-18 RX ORDER — FLUTICASONE FUROATE, UMECLIDINIUM BROMIDE AND VILANTEROL TRIFENATATE 100; 62.5; 25 UG/1; UG/1; UG/1
POWDER RESPIRATORY (INHALATION)
Qty: 1 EACH | Refills: 5 | Status: SHIPPED | OUTPATIENT
Start: 2023-10-18

## 2023-10-18 NOTE — TELEPHONE ENCOUNTER
[facility] is requesting a refill on the following medication(s):  Requested Prescriptions     Pending Prescriptions Disp Refills    Ramo Ibarra 100-62.5-25 MCG/ACT AEPB inhaler [Pharmacy Med Name: Ramomarcell Pereirah 100-62.5-25] 1 each 5     Sig: INHALE 1 PUFF BY MOUTH DAILY       Last Visit Date (If Applicable):  1/47/0126    Next Visit Date:    3/22/2024

## 2023-10-20 DIAGNOSIS — F51.04 PSYCHOPHYSIOLOGICAL INSOMNIA: ICD-10-CM

## 2023-10-20 RX ORDER — QUETIAPINE FUMARATE 25 MG/1
TABLET, FILM COATED ORAL
Qty: 30 TABLET | Refills: 2 | Status: SHIPPED | OUTPATIENT
Start: 2023-10-20

## 2023-10-20 NOTE — TELEPHONE ENCOUNTER
Margie Calzada called requesting a refill of the below medication which has been pended for you:     Requested Prescriptions     Pending Prescriptions Disp Refills    QUEtiapine (SEROQUEL) 25 MG tablet [Pharmacy Med Name: QUEtiapine FUMARATE 25 MG TAB] 30 tablet 2     Sig: TAKE ONE TABLET BY MOUTH ONCE NIGHTLY AS NEEDED FOR INSOMNIA       Last Appointment Date: 9/22/2023  Next Appointment Date: 3/22/2024    Allergies   Allergen Reactions    Amoxicillin Nausea Only     GI UPSET    Other      seaosanal allergies      Pcn [Penicillins] Nausea Only     GI UPSET

## 2023-12-04 ENCOUNTER — OFFICE VISIT (OUTPATIENT)
Dept: PRIMARY CARE CLINIC | Age: 43
End: 2023-12-04
Payer: COMMERCIAL

## 2023-12-04 VITALS
HEART RATE: 80 BPM | SYSTOLIC BLOOD PRESSURE: 118 MMHG | OXYGEN SATURATION: 93 % | BODY MASS INDEX: 24.89 KG/M2 | DIASTOLIC BLOOD PRESSURE: 74 MMHG | WEIGHT: 145 LBS | TEMPERATURE: 98.7 F

## 2023-12-04 DIAGNOSIS — J44.1 COPD EXACERBATION (HCC): Primary | ICD-10-CM

## 2023-12-04 PROCEDURE — 94640 AIRWAY INHALATION TREATMENT: CPT | Performed by: NURSE PRACTITIONER

## 2023-12-04 PROCEDURE — 99214 OFFICE O/P EST MOD 30 MIN: CPT | Performed by: NURSE PRACTITIONER

## 2023-12-04 RX ORDER — IPRATROPIUM BROMIDE AND ALBUTEROL SULFATE 2.5; .5 MG/3ML; MG/3ML
1 SOLUTION RESPIRATORY (INHALATION) ONCE
Status: COMPLETED | OUTPATIENT
Start: 2023-12-04 | End: 2023-12-04

## 2023-12-04 RX ORDER — BLOOD-GLUCOSE METER
KIT MISCELLANEOUS
Qty: 1 EACH | Refills: 0 | Status: SHIPPED | OUTPATIENT
Start: 2023-12-04

## 2023-12-04 RX ORDER — CEFUROXIME AXETIL 500 MG/1
500 TABLET ORAL 2 TIMES DAILY
Qty: 14 TABLET | Refills: 0 | Status: SHIPPED | OUTPATIENT
Start: 2023-12-04 | End: 2023-12-11

## 2023-12-04 RX ORDER — AZITHROMYCIN 250 MG/1
TABLET, FILM COATED ORAL
COMMUNITY
Start: 2023-12-03

## 2023-12-04 RX ORDER — ALBUTEROL SULFATE 1.25 MG/3ML
1 SOLUTION RESPIRATORY (INHALATION) EVERY 4 HOURS PRN
Qty: 360 ML | Refills: 3 | Status: SHIPPED | OUTPATIENT
Start: 2023-12-04

## 2023-12-04 RX ORDER — IPRATROPIUM BROMIDE AND ALBUTEROL SULFATE 2.5; .5 MG/3ML; MG/3ML
1 SOLUTION RESPIRATORY (INHALATION) EVERY 6 HOURS PRN
Qty: 360 ML | Refills: 2 | Status: SHIPPED | OUTPATIENT
Start: 2023-12-04

## 2023-12-04 RX ADMIN — IPRATROPIUM BROMIDE AND ALBUTEROL SULFATE 1 DOSE: 2.5; .5 SOLUTION RESPIRATORY (INHALATION) at 18:37

## 2023-12-04 ASSESSMENT — COPD QUESTIONNAIRES: COPD: 1

## 2023-12-04 ASSESSMENT — ENCOUNTER SYMPTOMS
SHORTNESS OF BREATH: 1
SPUTUM PRODUCTION: 0
COUGH: 1
CHEST TIGHTNESS: 1
WHEEZING: 1

## 2023-12-04 NOTE — PATIENT INSTRUCTIONS
Start prednisone prescribed at [facility]  I am adding ceftin which is another antibiotic   Use nebulizers at home as prescribed  Keep pulmonology appt tomorrow

## 2023-12-05 ENCOUNTER — HOSPITAL ENCOUNTER (OUTPATIENT)
Age: 43
Discharge: HOME | End: 2023-12-05
Payer: COMMERCIAL

## 2023-12-05 ENCOUNTER — OFFICE VISIT (OUTPATIENT)
Dept: PULMONOLOGY | Age: 43
End: 2023-12-05
Payer: COMMERCIAL

## 2023-12-05 VITALS
WEIGHT: 150 LBS | BODY MASS INDEX: 25.61 KG/M2 | DIASTOLIC BLOOD PRESSURE: 62 MMHG | OXYGEN SATURATION: 96 % | HEART RATE: 86 BPM | HEIGHT: 64 IN | SYSTOLIC BLOOD PRESSURE: 110 MMHG

## 2023-12-05 DIAGNOSIS — J41.1 MUCOPURULENT CHRONIC BRONCHITIS (HCC): Primary | ICD-10-CM

## 2023-12-05 DIAGNOSIS — J41.1 MUCOPURULENT CHRONIC BRONCHITIS (HCC): ICD-10-CM

## 2023-12-05 DIAGNOSIS — J96.11 CHRONIC RESPIRATORY FAILURE WITH HYPOXIA (HCC): ICD-10-CM

## 2023-12-05 DIAGNOSIS — J98.4 MIXED RESTRICTIVE AND OBSTRUCTIVE LUNG DISEASE (HCC): ICD-10-CM

## 2023-12-05 DIAGNOSIS — J43.9 MIXED RESTRICTIVE AND OBSTRUCTIVE LUNG DISEASE (HCC): ICD-10-CM

## 2023-12-05 DIAGNOSIS — J44.1 ACUTE EXACERBATION OF CHRONIC OBSTRUCTIVE PULMONARY DISEASE (COPD) (HCC): ICD-10-CM

## 2023-12-05 LAB
BASOPHILS # BLD: 0.04 K/UL (ref 0–0.2)
BASOPHILS NFR BLD: 0 % (ref 0–2)
EOSINOPHIL # BLD: <0.03 K/UL (ref 0–0.44)
EOSINOPHILS RELATIVE PERCENT: 0 % (ref 1–4)
ERYTHROCYTE [DISTWIDTH] IN BLOOD BY AUTOMATED COUNT: 13.7 % (ref 11.8–14.4)
HCT VFR BLD AUTO: 41.9 % (ref 36.3–47.1)
HGB BLD-MCNC: 13.6 G/DL (ref 11.9–15.1)
IMM GRANULOCYTES # BLD AUTO: 0.05 K/UL (ref 0–0.3)
IMM GRANULOCYTES NFR BLD: 0 %
LYMPHOCYTES NFR BLD: 1.13 K/UL (ref 1.1–3.7)
LYMPHOCYTES RELATIVE PERCENT: 8 % (ref 24–43)
MCH RBC QN AUTO: 29.3 PG (ref 25.2–33.5)
MCHC RBC AUTO-ENTMCNC: 32.5 G/DL (ref 25.2–33.5)
MCV RBC AUTO: 90.3 FL (ref 82.6–102.9)
MONOCYTES NFR BLD: 0.42 K/UL (ref 0.1–1.2)
MONOCYTES NFR BLD: 3 % (ref 3–12)
NEUTROPHILS NFR BLD: 89 % (ref 36–65)
NEUTS SEG NFR BLD: 12.19 K/UL (ref 1.5–8.1)
NRBC BLD-RTO: 0 PER 100 WBC
PLATELET # BLD AUTO: 346 K/UL (ref 138–453)
PMV BLD AUTO: 10.9 FL (ref 8.1–13.5)
RBC # BLD AUTO: 4.64 M/UL (ref 3.95–5.11)
WBC OTHER # BLD: 13.9 K/UL (ref 3.5–11.3)

## 2023-12-05 PROCEDURE — 82104 ALPHA-1-ANTITRYPSIN PHENO: CPT

## 2023-12-05 PROCEDURE — 82103 ALPHA-1-ANTITRYPSIN TOTAL: CPT

## 2023-12-05 PROCEDURE — 99214 OFFICE O/P EST MOD 30 MIN: CPT | Performed by: INTERNAL MEDICINE

## 2023-12-05 PROCEDURE — 36415 COLL VENOUS BLD VENIPUNCTURE: CPT

## 2023-12-05 PROCEDURE — 85025 COMPLETE CBC W/AUTO DIFF WBC: CPT

## 2023-12-05 RX ORDER — PREDNISONE 20 MG/1
40 TABLET ORAL DAILY
COMMUNITY
Start: 2023-12-02 | End: 2023-12-07

## 2023-12-06 NOTE — PROGRESS NOTES
PULMONARY MEDICINE OUTPATIENT NOTE                                                                       PATIENT:  Roxana Mattson  YOB: 1980  MRN: [id number]     REFERRED BY: Sekou Marquez, OSEI - CNP   CHIEF COMPLIANT: COPD (COPD f/u)       HISTORY     Roxana Mattson is a 43 y.o. years old female is here for a follow-up evaluation in the pulmonary clinic patient has previously seen Dr. Brownlee in the pulmonary clinic (August 2022)    DATE OF VISIT:12/5/2023    PULMONARY PROBLEM LIST:  1. Mucopurulent chronic bronchitis (HCC)    2. Mixed restrictive and obstructive lung disease (HCC)    3. Chronic respiratory failure with hypoxia (HCC)      HISTORY OF PRESENT ILLNESS/CURRENT SYMPTOMS:   Patient is a former smoker.  She quit about 4 months back.  She has greater than 25-pack-year smoking history.  She was hospitalized for 3 days with acute extubation of COPD in July 2023 and was also seen in the ER (August 2023) for pneumonia, and was prescribed azithromycin.    PFT (6/20/2022) showed postbronchodilator FEV1 1.02 L (37% predicted), FVC 36% predicted, normal FEV1/FVC ratio, significant bronchodilator response, with air trapping and moderate reduction in diffusion capacity.    CT chest (4/5/2022, [facility]) reported patchy interstitial and alveolar opacities in both lung concerning for viral pneumonia  CT chest (5/8/2022) shows bilateral nonspecific groundglass opacities    Alpha-1 antitrypsin level within normal limit (145), M2M2 phenotype    Autoimmune workup including NERI, ANCA, dsDNA, rheumatoid factor were negative, IgE and allergy panel are also negative    CURRENT BRONCHODILATORS/OTHER PULMONARY MEDS:  Trelegy Ellipta  As needed albuterol    TOBACCO HISTORY:  She  reports that she quit smoking about 4 months ago. Her smoking use included cigarettes. She started smoking about 25 years ago. She has a 25.00 pack-year smoking history. She has never used smokeless tobacco.    AVOCATION/OCCUPATIONAL

## 2023-12-09 LAB
ALPHA-1 ANTITRYPSIN PHENOTYPE: NORMAL
ALPHA-1 ANTITRYPSIN: 197 MG/DL (ref 90–200)

## 2023-12-29 DIAGNOSIS — K21.9 GASTROESOPHAGEAL REFLUX DISEASE, UNSPECIFIED WHETHER ESOPHAGITIS PRESENT: ICD-10-CM

## 2023-12-29 RX ORDER — FAMOTIDINE 40 MG/1
40 TABLET, FILM COATED ORAL NIGHTLY
Qty: 90 TABLET | Refills: 1 | Status: SHIPPED | OUTPATIENT
Start: 2023-12-29

## 2023-12-29 NOTE — TELEPHONE ENCOUNTER
Bertrand Godwin called requesting a refill of the below medication which has been pended for you:     Requested Prescriptions     Pending Prescriptions Disp Refills    famotidine (PEPCID) 40 MG tablet [Pharmacy Med Name: FAMOTIDINE 40 MG TABLET] 90 tablet 1     Sig: TAKE 1 TABLET BY MOUTH AT BEDTIME       Last Appointment Date: 12/4/2023  Next Appointment Date: 3/22/2024    Allergies   Allergen Reactions    Amoxicillin Nausea Only     GI UPSET    Other      seaosanal allergies      Pcn [Penicillins] Nausea Only     GI UPSET

## 2024-01-02 DIAGNOSIS — J32.4 CHRONIC PANSINUSITIS: ICD-10-CM

## 2024-01-02 NOTE — TELEPHONE ENCOUNTER
Roxana called requesting a refill of the below medication which has been pended for you:     Requested Prescriptions     Pending Prescriptions Disp Refills    montelukast (SINGULAIR) 10 MG tablet [Pharmacy Med Name: MONTELUKAST SOD 10 MG TABLET, UU] 90 tablet 0     Sig: TAKE ONE TABLET BY MOUTH ONCE NIGHTLY       Last Appointment Date: 12/4/2023  Next Appointment Date: 3/22/2024    Allergies   Allergen Reactions    Amoxicillin Nausea Only     GI UPSET    Other      seaosanal allergies      Pcn [Penicillins] Nausea Only     GI UPSET

## 2024-01-03 RX ORDER — MONTELUKAST SODIUM 10 MG/1
10 TABLET ORAL NIGHTLY
Qty: 90 TABLET | Refills: 1 | Status: SHIPPED | OUTPATIENT
Start: 2024-01-03

## 2024-01-16 DIAGNOSIS — J41.1 MUCOPURULENT CHRONIC BRONCHITIS (HCC): Primary | ICD-10-CM

## 2024-02-10 DIAGNOSIS — F51.04 PSYCHOPHYSIOLOGICAL INSOMNIA: ICD-10-CM

## 2024-02-12 RX ORDER — QUETIAPINE FUMARATE 25 MG/1
TABLET, FILM COATED ORAL
Qty: 30 TABLET | Refills: 2 | Status: SHIPPED | OUTPATIENT
Start: 2024-02-12

## 2024-02-12 NOTE — TELEPHONE ENCOUNTER
Roxana called requesting a refill of the below medication which has been pended for you:     Requested Prescriptions     Pending Prescriptions Disp Refills    QUEtiapine (SEROQUEL) 25 MG tablet [Pharmacy Med Name: QUEtiapine FUMARATE 25 MG TAB] 30 tablet 2     Sig: TAKE 1 TABLET BY MOUTH ONCE NIGHTLY AS NEEDED FOR INSOMNIA       Last Appointment Date: 12/4/2023  Next Appointment Date: 3/22/2024    Allergies   Allergen Reactions    Amoxicillin Nausea Only     GI UPSET    Other      seaosanal allergies      Pcn [Penicillins] Nausea Only     GI UPSET

## 2024-02-13 ENCOUNTER — OFFICE VISIT (OUTPATIENT)
Dept: PULMONOLOGY | Age: 44
End: 2024-02-13
Payer: COMMERCIAL

## 2024-02-13 ENCOUNTER — HOSPITAL ENCOUNTER (OUTPATIENT)
Dept: GENERAL RADIOLOGY | Age: 44
Discharge: HOME | End: 2024-02-15
Payer: COMMERCIAL

## 2024-02-13 VITALS
DIASTOLIC BLOOD PRESSURE: 68 MMHG | RESPIRATION RATE: 12 BRPM | BODY MASS INDEX: 29.63 KG/M2 | HEIGHT: 62 IN | TEMPERATURE: 97.5 F | OXYGEN SATURATION: 94 % | HEART RATE: 73 BPM | WEIGHT: 161 LBS | SYSTOLIC BLOOD PRESSURE: 96 MMHG

## 2024-02-13 DIAGNOSIS — J43.9 MIXED RESTRICTIVE AND OBSTRUCTIVE LUNG DISEASE (HCC): ICD-10-CM

## 2024-02-13 DIAGNOSIS — J41.1 MUCOPURULENT CHRONIC BRONCHITIS (HCC): ICD-10-CM

## 2024-02-13 DIAGNOSIS — J98.4 MIXED RESTRICTIVE AND OBSTRUCTIVE LUNG DISEASE (HCC): ICD-10-CM

## 2024-02-13 DIAGNOSIS — J41.1 MUCOPURULENT CHRONIC BRONCHITIS (HCC): Primary | ICD-10-CM

## 2024-02-13 DIAGNOSIS — F17.200 SMOKER: ICD-10-CM

## 2024-02-13 DIAGNOSIS — J96.11 CHRONIC RESPIRATORY FAILURE WITH HYPOXIA (HCC): ICD-10-CM

## 2024-02-13 PROBLEM — J44.1 ACUTE EXACERBATION OF CHRONIC OBSTRUCTIVE PULMONARY DISEASE (COPD) (HCC): Status: RESOLVED | Noted: 2023-07-16 | Resolved: 2024-02-13

## 2024-02-13 PROBLEM — J44.1 COPD EXACERBATION (HCC): Status: RESOLVED | Noted: 2022-07-22 | Resolved: 2024-02-13

## 2024-02-13 PROBLEM — J44.9 MIXED RESTRICTIVE AND OBSTRUCTIVE LUNG DISEASE: Status: ACTIVE | Noted: 2024-02-13

## 2024-02-13 PROCEDURE — 99214 OFFICE O/P EST MOD 30 MIN: CPT | Performed by: INTERNAL MEDICINE

## 2024-02-13 PROCEDURE — 71046 X-RAY EXAM CHEST 2 VIEWS: CPT

## 2024-02-13 RX ORDER — GARLIC 180 MG
1 TABLET, DELAYED RELEASE (ENTERIC COATED) ORAL 2 TIMES DAILY
COMMUNITY

## 2024-02-14 NOTE — PROGRESS NOTES
PULMONARY MEDICINE OUTPATIENT NOTE                                                                       PATIENT:  Roxana Mattson  YOB: 1980  MRN: [id number]     REFERRED BY: Sekou Marquez APRN - CNP   CHIEF COMPLIANT: Follow-up (CT and Xray)       HISTORY     Roxana Mattson is a 43 y.o. years old female is here for a follow-up evaluation in the pulmonary clinic patient has previously seen Dr. Brownlee in the pulmonary clinic (August 2022)    INITIAL VISIT (Dr. Brownlee): 9/1/2022  LAST VISIT: 12/5/2023  DATE OF VISIT:2/13/2024    PULMONARY PROBLEM LIST:  1. Mucopurulent chronic bronchitis (HCC)    2. Mixed restrictive and obstructive lung disease (HCC)    3. Chronic respiratory failure with hypoxia (HCC)    4. Smoker        HISTORY OF PRESENT ILLNESS/CURRENT SYMPTOMS:   Patient is a chronic smoker and has greater than 25-pack-year smoking history.  She was last seen in clinic after COPD flares up in July/August 2023, after which she quit smoking.  Unfortunately, patient has started smoking again.  She has chronic cough, sputum and grade 2 exertional dyspnea.    PFT (6/20/2022) showed postbronchodilator FEV1 1.02 L (37% predicted), FVC 36% predicted, normal FEV1/FVC ratio, significant bronchodilator response, with air trapping and moderate reduction in diffusion capacity.    CT chest (4/5/2022, [facility]) reported patchy interstitial and alveolar opacities in both lung concerning for viral pneumonia  CT chest (5/8/2022) shows bilateral nonspecific groundglass opacities  HRCT ordered at last clinic visit was not approved by insurance.  Chest x-ray done today shows no acute process    Alpha-1 antitrypsin level within normal limit (145), M2M2 phenotype. Autoimmune workup including NERI, ANCA, dsDNA, rheumatoid factor were negative, IgE and allergy panel are also negative    CURRENT BRONCHODILATORS/OTHER PULMONARY MEDS:  Trelegy Ellipta  As needed albuterol    TOBACCO HISTORY:  She  reports that she has

## 2024-03-13 ENCOUNTER — APPOINTMENT (OUTPATIENT)
Dept: GENERAL RADIOLOGY | Age: 44
End: 2024-03-13
Payer: COMMERCIAL

## 2024-03-13 ENCOUNTER — HOSPITAL ENCOUNTER (EMERGENCY)
Age: 44
Discharge: HOME | End: 2024-03-14
Attending: EMERGENCY MEDICINE
Payer: COMMERCIAL

## 2024-03-13 DIAGNOSIS — R09.02 HYPOXIA: ICD-10-CM

## 2024-03-13 DIAGNOSIS — R19.7 DIARRHEA, UNSPECIFIED TYPE: ICD-10-CM

## 2024-03-13 DIAGNOSIS — J44.1 COPD EXACERBATION (HCC): Primary | ICD-10-CM

## 2024-03-13 LAB
ANION GAP SERPL CALCULATED.3IONS-SCNC: 14 MMOL/L (ref 9–17)
BASOPHILS # BLD: 0.07 K/UL (ref 0–0.2)
BASOPHILS NFR BLD: 1 % (ref 0–2)
BUN SERPL-MCNC: 17 MG/DL (ref 6–20)
BUN/CREAT SERPL: 24 (ref 9–20)
CALCIUM SERPL-MCNC: 9.2 MG/DL (ref 8.6–10.4)
CHLORIDE SERPL-SCNC: 102 MMOL/L (ref 98–107)
CO2 SERPL-SCNC: 26 MMOL/L (ref 20–31)
CREAT SERPL-MCNC: 0.7 MG/DL (ref 0.5–0.9)
D DIMER PPP FEU-MCNC: 0.27 UG/ML FEU (ref 0–0.59)
EOSINOPHIL # BLD: 0.64 K/UL (ref 0–0.44)
EOSINOPHILS RELATIVE PERCENT: 6 % (ref 1–4)
ERYTHROCYTE [DISTWIDTH] IN BLOOD BY AUTOMATED COUNT: 12.7 % (ref 11.8–14.4)
FLUAV AG SPEC QL: NEGATIVE
FLUBV AG SPEC QL: NEGATIVE
GFR SERPL CREATININE-BSD FRML MDRD: >60 ML/MIN/1.73M2
GLUCOSE SERPL-MCNC: 86 MG/DL (ref 70–99)
HCT VFR BLD AUTO: 40.2 % (ref 36.3–47.1)
HGB BLD-MCNC: 13.2 G/DL (ref 11.9–15.1)
IMM GRANULOCYTES # BLD AUTO: 0.04 K/UL (ref 0–0.3)
IMM GRANULOCYTES NFR BLD: 0 %
LYMPHOCYTES NFR BLD: 2.06 K/UL (ref 1.1–3.7)
LYMPHOCYTES RELATIVE PERCENT: 18 % (ref 24–43)
MCH RBC QN AUTO: 29.3 PG (ref 25.2–33.5)
MCHC RBC AUTO-ENTMCNC: 32.8 G/DL (ref 25.2–33.5)
MCV RBC AUTO: 89.3 FL (ref 82.6–102.9)
MONOCYTES NFR BLD: 0.78 K/UL (ref 0.1–1.2)
MONOCYTES NFR BLD: 7 % (ref 3–12)
NEUTROPHILS NFR BLD: 68 % (ref 36–65)
NEUTS SEG NFR BLD: 7.79 K/UL (ref 1.5–8.1)
NRBC BLD-RTO: 0 PER 100 WBC
PLATELET # BLD AUTO: 291 K/UL (ref 138–453)
PMV BLD AUTO: 10.4 FL (ref 8.1–13.5)
POTASSIUM SERPL-SCNC: 4 MMOL/L (ref 3.7–5.3)
RBC # BLD AUTO: 4.5 M/UL (ref 3.95–5.11)
SARS-COV-2 RDRP RESP QL NAA+PROBE: NOT DETECTED
SODIUM SERPL-SCNC: 142 MMOL/L (ref 135–144)
SPECIMEN DESCRIPTION: NORMAL
TROPONIN I SERPL HS-MCNC: <6 NG/L (ref 0–14)
WBC OTHER # BLD: 11.4 K/UL (ref 3.5–11.3)

## 2024-03-13 PROCEDURE — 87804 INFLUENZA ASSAY W/OPTIC: CPT

## 2024-03-13 PROCEDURE — 87635 SARS-COV-2 COVID-19 AMP PRB: CPT

## 2024-03-13 PROCEDURE — 6370000000 HC RX 637 (ALT 250 FOR IP): Performed by: EMERGENCY MEDICINE

## 2024-03-13 PROCEDURE — 36415 COLL VENOUS BLD VENIPUNCTURE: CPT

## 2024-03-13 PROCEDURE — 85025 COMPLETE CBC W/AUTO DIFF WBC: CPT

## 2024-03-13 PROCEDURE — 99285 EMERGENCY DEPT VISIT HI MDM: CPT

## 2024-03-13 PROCEDURE — 2580000003 HC RX 258: Performed by: EMERGENCY MEDICINE

## 2024-03-13 PROCEDURE — 85379 FIBRIN DEGRADATION QUANT: CPT

## 2024-03-13 PROCEDURE — 96361 HYDRATE IV INFUSION ADD-ON: CPT

## 2024-03-13 PROCEDURE — 93005 ELECTROCARDIOGRAM TRACING: CPT | Performed by: EMERGENCY MEDICINE

## 2024-03-13 PROCEDURE — 71045 X-RAY EXAM CHEST 1 VIEW: CPT

## 2024-03-13 PROCEDURE — 94640 AIRWAY INHALATION TREATMENT: CPT

## 2024-03-13 PROCEDURE — 96374 THER/PROPH/DIAG INJ IV PUSH: CPT

## 2024-03-13 PROCEDURE — 80048 BASIC METABOLIC PNL TOTAL CA: CPT

## 2024-03-13 PROCEDURE — 84484 ASSAY OF TROPONIN QUANT: CPT

## 2024-03-13 RX ORDER — IPRATROPIUM BROMIDE AND ALBUTEROL SULFATE 2.5; .5 MG/3ML; MG/3ML
1 SOLUTION RESPIRATORY (INHALATION) ONCE
Status: COMPLETED | OUTPATIENT
Start: 2024-03-13 | End: 2024-03-13

## 2024-03-13 RX ORDER — 0.9 % SODIUM CHLORIDE 0.9 %
1000 INTRAVENOUS SOLUTION INTRAVENOUS ONCE
Status: COMPLETED | OUTPATIENT
Start: 2024-03-13 | End: 2024-03-13

## 2024-03-13 RX ORDER — 0.9 % SODIUM CHLORIDE 0.9 %
1000 INTRAVENOUS SOLUTION INTRAVENOUS ONCE
Status: COMPLETED | OUTPATIENT
Start: 2024-03-13 | End: 2024-03-14

## 2024-03-13 RX ADMIN — IPRATROPIUM BROMIDE AND ALBUTEROL SULFATE 1 DOSE: 2.5; .5 SOLUTION RESPIRATORY (INHALATION) at 20:29

## 2024-03-13 RX ADMIN — SODIUM CHLORIDE 1000 ML: 9 INJECTION, SOLUTION INTRAVENOUS at 21:00

## 2024-03-13 RX ADMIN — SODIUM CHLORIDE 1000 ML: 9 INJECTION, SOLUTION INTRAVENOUS at 22:27

## 2024-03-13 RX ADMIN — IPRATROPIUM BROMIDE AND ALBUTEROL SULFATE 1 DOSE: 2.5; .5 SOLUTION RESPIRATORY (INHALATION) at 20:28

## 2024-03-13 ASSESSMENT — PAIN - FUNCTIONAL ASSESSMENT: PAIN_FUNCTIONAL_ASSESSMENT: NONE - DENIES PAIN

## 2024-03-13 ASSESSMENT — LIFESTYLE VARIABLES
HOW MANY STANDARD DRINKS CONTAINING ALCOHOL DO YOU HAVE ON A TYPICAL DAY: 1 OR 2
HOW OFTEN DO YOU HAVE A DRINK CONTAINING ALCOHOL: MONTHLY OR LESS

## 2024-03-14 VITALS
TEMPERATURE: 97.5 F | DIASTOLIC BLOOD PRESSURE: 64 MMHG | HEART RATE: 107 BPM | SYSTOLIC BLOOD PRESSURE: 110 MMHG | OXYGEN SATURATION: 91 % | RESPIRATION RATE: 16 BRPM | WEIGHT: 162 LBS | BODY MASS INDEX: 30.11 KG/M2

## 2024-03-14 LAB
C DIFF GDH + TOXINS A+B STL QL IA.RAPID: NEGATIVE
SPECIMEN DESCRIPTION: NORMAL

## 2024-03-14 PROCEDURE — 6360000002 HC RX W HCPCS: Performed by: EMERGENCY MEDICINE

## 2024-03-14 PROCEDURE — 87449 NOS EACH ORGANISM AG IA: CPT

## 2024-03-14 PROCEDURE — 6370000000 HC RX 637 (ALT 250 FOR IP): Performed by: EMERGENCY MEDICINE

## 2024-03-14 PROCEDURE — 87324 CLOSTRIDIUM AG IA: CPT

## 2024-03-14 PROCEDURE — 96374 THER/PROPH/DIAG INJ IV PUSH: CPT

## 2024-03-14 RX ORDER — NICOTINE 21 MG/24HR
1 PATCH, TRANSDERMAL 24 HOURS TRANSDERMAL DAILY
Qty: 14 PATCH | Refills: 0 | Status: SHIPPED | OUTPATIENT
Start: 2024-03-14 | End: 2024-03-28

## 2024-03-14 RX ORDER — IPRATROPIUM BROMIDE AND ALBUTEROL SULFATE 2.5; .5 MG/3ML; MG/3ML
1 SOLUTION RESPIRATORY (INHALATION) ONCE
Status: COMPLETED | OUTPATIENT
Start: 2024-03-14 | End: 2024-03-14

## 2024-03-14 RX ORDER — METHYLPREDNISOLONE SODIUM SUCCINATE 125 MG/2ML
125 INJECTION, POWDER, LYOPHILIZED, FOR SOLUTION INTRAMUSCULAR; INTRAVENOUS ONCE
Status: COMPLETED | OUTPATIENT
Start: 2024-03-14 | End: 2024-03-14

## 2024-03-14 RX ORDER — ALPRAZOLAM 0.25 MG/1
0.25 TABLET ORAL ONCE
Status: COMPLETED | OUTPATIENT
Start: 2024-03-14 | End: 2024-03-14

## 2024-03-14 RX ORDER — ALBUTEROL SULFATE 90 UG/1
2 AEROSOL, METERED RESPIRATORY (INHALATION) 4 TIMES DAILY PRN
Qty: 18 G | Refills: 1 | Status: SHIPPED | OUTPATIENT
Start: 2024-03-14

## 2024-03-14 RX ORDER — PREDNISONE 10 MG/1
TABLET ORAL
Qty: 20 TABLET | Refills: 0 | Status: SHIPPED | OUTPATIENT
Start: 2024-03-14 | End: 2024-03-24

## 2024-03-14 RX ORDER — LOPERAMIDE HYDROCHLORIDE 2 MG/1
2 CAPSULE ORAL ONCE
Status: COMPLETED | OUTPATIENT
Start: 2024-03-14 | End: 2024-03-14

## 2024-03-14 RX ADMIN — LOPERAMIDE HYDROCHLORIDE 2 MG: 2 CAPSULE ORAL at 01:25

## 2024-03-14 RX ADMIN — METHYLPREDNISOLONE SODIUM SUCCINATE 125 MG: 125 INJECTION INTRAMUSCULAR; INTRAVENOUS at 01:25

## 2024-03-14 RX ADMIN — ALPRAZOLAM 0.25 MG: 0.25 TABLET ORAL at 01:25

## 2024-03-14 RX ADMIN — IPRATROPIUM BROMIDE AND ALBUTEROL SULFATE 1 DOSE: 2.5; .5 SOLUTION RESPIRATORY (INHALATION) at 01:21

## 2024-03-14 ASSESSMENT — ENCOUNTER SYMPTOMS
SHORTNESS OF BREATH: 1
VOMITING: 0
WHEEZING: 1
COUGH: 1
NAUSEA: 0

## 2024-03-14 NOTE — ED PROVIDER NOTES
[facility]  [address] [address]  Phone: [phone]  eMERGENCY dEPARTMENT eNCOUnter      Pt Name: Roxana Mattson  MRN: [id number]  Birthdate 1980  Date of evaluation: 3/14/24      CHIEF COMPLAINT     Chief Complaint   Patient presents with    Shortness of Breath       HISTORY OF PRESENT ILLNESS    Roxana Mattson is a 43 y.o. female who presents today after being referred from urgent care for shortness of breath and hypoxia.  Patient has a known history of COPD.  She is currently smoking 1 pack/day except for the past 4 days when she has been too short of breath and only smoking 1 cigarette/day she does have a history of chronic or mucopurulent bronchitis, as well as mixed restrictive and obstructive lung disease.  She does have home oxygen which it does not sound as though she has been using regularly.  [facility] and was started on Ceftin and azithromycin.  She states that she still has approximately 4 days left of these.  3 days ago she received a shot of Decadron but states she did not except a typical steroid burst as she states that she has been not able to sleep and that makes her feel anxious.  I did review some of her previous admissions and it is noted that she gets tachycardic with albuterol and was prescribed Xopenex however does not seem that she was able to get that filled.    REVIEW OF SYSTEMS       Review of Systems   Constitutional:  Positive for fatigue. Negative for fever.   HENT:  Negative for congestion.    Respiratory:  Positive for cough, shortness of breath and wheezing.    Gastrointestinal:  Negative for nausea and vomiting.   Genitourinary:  Negative for dysuria.   Skin:  Negative for rash.   Neurological:  Positive for light-headedness. Negative for syncope.   All other systems reviewed and are negative.       PAST MEDICAL HISTORY    has a past medical history of Atrophic vaginitis,

## 2024-03-14 NOTE — DISCHARGE INSTRUCTIONS
Please follow-up with your PCP or pulmonologist within 2 days.  Medications as prescribed.  Please use your oxygen at all times.  Steroids for the next 5 days.  Continue previous course of antibiotics.  Drink plenty of fluids.  Return to the emergency department for persistent nausea vomiting, not tolerating fluids by mouth, chest pain or difficulty breathing, oxygen saturation below 90%, or any other acute concerns.

## 2024-03-15 ENCOUNTER — OFFICE VISIT (OUTPATIENT)
Dept: FAMILY MEDICINE CLINIC | Age: 44
End: 2024-03-15

## 2024-03-15 VITALS
HEIGHT: 62 IN | HEART RATE: 65 BPM | BODY MASS INDEX: 28.34 KG/M2 | DIASTOLIC BLOOD PRESSURE: 68 MMHG | OXYGEN SATURATION: 99 % | WEIGHT: 154 LBS | SYSTOLIC BLOOD PRESSURE: 102 MMHG | RESPIRATION RATE: 18 BRPM

## 2024-03-15 DIAGNOSIS — J44.1 COPD EXACERBATION (HCC): Primary | ICD-10-CM

## 2024-03-15 DIAGNOSIS — R19.7 DIARRHEA, UNSPECIFIED TYPE: ICD-10-CM

## 2024-03-15 DIAGNOSIS — J41.1 MUCOPURULENT CHRONIC BRONCHITIS (HCC): ICD-10-CM

## 2024-03-15 DIAGNOSIS — F51.04 PSYCHOPHYSIOLOGICAL INSOMNIA: ICD-10-CM

## 2024-03-15 LAB
EKG ATRIAL RATE: 88 BPM
EKG P AXIS: 37 DEGREES
EKG P-R INTERVAL: 140 MS
EKG Q-T INTERVAL: 366 MS
EKG QRS DURATION: 72 MS
EKG QTC CALCULATION (BAZETT): 442 MS
EKG R AXIS: 20 DEGREES
EKG T AXIS: 48 DEGREES
EKG VENTRICULAR RATE: 88 BPM

## 2024-03-15 RX ORDER — ALPRAZOLAM 0.25 MG/1
0.25 TABLET ORAL 3 TIMES DAILY PRN
Qty: 30 TABLET | Refills: 0 | Status: SHIPPED | OUTPATIENT
Start: 2024-03-15 | End: 2024-03-25

## 2024-03-15 RX ORDER — DOXYCYCLINE HYCLATE 100 MG
100 TABLET ORAL 2 TIMES DAILY
Qty: 20 TABLET | Refills: 1 | Status: SHIPPED | OUTPATIENT
Start: 2024-03-15 | End: 2024-04-04

## 2024-03-15 RX ORDER — CEFUROXIME AXETIL 500 MG/1
500 TABLET ORAL 2 TIMES DAILY
COMMUNITY
Start: 2024-03-09 | End: 2024-03-15

## 2024-03-15 SDOH — ECONOMIC STABILITY: FOOD INSECURITY: WITHIN THE PAST 12 MONTHS, YOU WORRIED THAT YOUR FOOD WOULD RUN OUT BEFORE YOU GOT MONEY TO BUY MORE.: NEVER TRUE

## 2024-03-15 SDOH — ECONOMIC STABILITY: FOOD INSECURITY: WITHIN THE PAST 12 MONTHS, THE FOOD YOU BOUGHT JUST DIDN'T LAST AND YOU DIDN'T HAVE MONEY TO GET MORE.: NEVER TRUE

## 2024-03-15 SDOH — ECONOMIC STABILITY: INCOME INSECURITY: HOW HARD IS IT FOR YOU TO PAY FOR THE VERY BASICS LIKE FOOD, HOUSING, MEDICAL CARE, AND HEATING?: HARD

## 2024-03-15 ASSESSMENT — PATIENT HEALTH QUESTIONNAIRE - PHQ9
6. FEELING BAD ABOUT YOURSELF - OR THAT YOU ARE A FAILURE OR HAVE LET YOURSELF OR YOUR FAMILY DOWN: 0
5. POOR APPETITE OR OVEREATING: 0
3. TROUBLE FALLING OR STAYING ASLEEP: 1
4. FEELING TIRED OR HAVING LITTLE ENERGY: 1
SUM OF ALL RESPONSES TO PHQ QUESTIONS 1-9: 3
SUM OF ALL RESPONSES TO PHQ QUESTIONS 1-9: 3
SUM OF ALL RESPONSES TO PHQ9 QUESTIONS 1 & 2: 1
9. THOUGHTS THAT YOU WOULD BE BETTER OFF DEAD, OR OF HURTING YOURSELF: 0
SUM OF ALL RESPONSES TO PHQ QUESTIONS 1-9: 3
7. TROUBLE CONCENTRATING ON THINGS, SUCH AS READING THE NEWSPAPER OR WATCHING TELEVISION: 0
1. LITTLE INTEREST OR PLEASURE IN DOING THINGS: 0
8. MOVING OR SPEAKING SO SLOWLY THAT OTHER PEOPLE COULD HAVE NOTICED. OR THE OPPOSITE, BEING SO FIGETY OR RESTLESS THAT YOU HAVE BEEN MOVING AROUND A LOT MORE THAN USUAL: 0
10. IF YOU CHECKED OFF ANY PROBLEMS, HOW DIFFICULT HAVE THESE PROBLEMS MADE IT FOR YOU TO DO YOUR WORK, TAKE CARE OF THINGS AT HOME, OR GET ALONG WITH OTHER PEOPLE: 0
SUM OF ALL RESPONSES TO PHQ QUESTIONS 1-9: 3
2. FEELING DOWN, DEPRESSED OR HOPELESS: 1

## 2024-03-15 ASSESSMENT — ENCOUNTER SYMPTOMS
WHEEZING: 1
SHORTNESS OF BREATH: 1
COUGH: 1

## 2024-03-15 NOTE — PROGRESS NOTES
Breath or Wheezing 360 mL 2    TRELEGY ELLIPTA 100-62.5-25 MCG/ACT AEPB inhaler INHALE 1 PUFF BY MOUTH DAILY 1 each 5    busPIRone (BUSPAR) 10 MG tablet TAKE ONE TABLET BY MOUTH TWICE A  tablet 1    citalopram (CELEXA) 40 MG tablet TAKE ONE TABLET BY MOUTH EVERY MORNING 90 tablet 1    metoprolol tartrate (LOPRESSOR) 25 MG tablet Take 2 tablets by mouth 2 times daily 360 tablet 1    midodrine (PROAMATINE) 5 MG tablet TAKE ONE TABLET BY MOUTH THREE TIMES A  tablet 1    omeprazole (PRILOSEC) 40 MG delayed release capsule Take 1 capsule by mouth in the morning and at bedtime 180 capsule 1    oxybutynin (DITROPAN XL) 10 MG extended release tablet Take 1 tablet by mouth daily 90 tablet 3    albuterol sulfate HFA (VENTOLIN HFA) 108 (90 Base) MCG/ACT inhaler Inhale 2 puffs into the lungs 4 times daily as needed for Wheezing 18 g 0    Nebulizer System All-In-One MISC Use with albuterol 1 each 0    desonide (DESOWEN) 0.05 % cream Apply topically 2 times daily. 15 g 2    BLACK COHOSH EXTRACT PO Take 1 tablet by mouth in the morning and at bedtime      UNABLE TO FIND Pulse Oximeter 1 each 0    OXYGEN Inhale 2 L/hr into the lungs continuous Adjust to keep sat 90-95%  DX: J44.1 3 Container 0    Oxygen Concentrator 2 liters continuous and ambulatory, needs portable tank also. Adjust to keep sat 90-95%. DX: J44.1 1 each 0    simvastatin (ZOCOR) 20 MG tablet TAKE ONE TABLET BY MOUTH ONCE NIGHTLY 90 tablet 1    Nebulizers (COMPRESSOR/NEBULIZER) MISC With tubing and supplies 1 each 3    Spacer/Aero-Holding Chambers MARIA DE JESUS 1 Device by Does not apply route as needed (To be used with albuterol inhaler) 1 each 0    vitamin D (CHOLECALCIFEROL) 1000 UNIT TABS tablet Take 2 tablets by mouth daily gummies      Multiple Vitamins-Minerals (MULTIVITAMIN PO) Take 1 tablet by mouth daily.      [DISCONTINUED] cefUROXime (CEFTIN) 500 MG tablet Take 1 tablet by mouth 2 times daily (Patient not taking: Reported on 3/15/2024)

## 2024-03-18 DIAGNOSIS — K21.9 GASTROESOPHAGEAL REFLUX DISEASE, UNSPECIFIED WHETHER ESOPHAGITIS PRESENT: ICD-10-CM

## 2024-03-18 DIAGNOSIS — F41.8 ANXIETY WITH DEPRESSION: ICD-10-CM

## 2024-03-18 RX ORDER — BUSPIRONE HYDROCHLORIDE 10 MG/1
TABLET ORAL
Qty: 180 TABLET | Refills: 0 | Status: SHIPPED | OUTPATIENT
Start: 2024-03-18

## 2024-03-18 RX ORDER — OMEPRAZOLE 40 MG/1
CAPSULE, DELAYED RELEASE ORAL
Qty: 180 CAPSULE | Refills: 0 | Status: SHIPPED | OUTPATIENT
Start: 2024-03-18

## 2024-03-18 RX ORDER — CITALOPRAM 40 MG/1
TABLET ORAL
Qty: 90 TABLET | Refills: 1 | Status: SHIPPED | OUTPATIENT
Start: 2024-03-18

## 2024-03-18 NOTE — TELEPHONE ENCOUNTER
Roxana called requesting a refill of the below medication which has been pended for you:     Requested Prescriptions     Pending Prescriptions Disp Refills    busPIRone (BUSPAR) 10 MG tablet [Pharmacy Med Name: BUSPIRONE HCL 10 MG TABLET] 180 tablet 0     Sig: TAKE 1 TABLET BY MOUTH TWICE A DAY    omeprazole (PRILOSEC) 40 MG delayed release capsule [Pharmacy Med Name: OMEPRAZOLE DR 40 MG CAPSULE] 180 capsule 0     Sig: TAKE ONE CAPSULE BY MOUTH EVERY MORNING AND TAKE ONE CAPSULE BY MOUTH EVERY NIGHT AT BEDTIME    citalopram (CELEXA) 40 MG tablet [Pharmacy Med Name: CITALOPRAM HBR 40 MG TABLET] 90 tablet 0     Sig: TAKE ONE TABLET BY MOUTH EVERY MORNING       Last Appointment Date: 3/15/2024  Next Appointment Date: 7/15/2024    Allergies   Allergen Reactions    Amoxicillin Nausea Only     GI UPSET    Other      seaosanal allergies      Pcn [Penicillins] Nausea Only     GI UPSET

## 2024-04-17 ENCOUNTER — TELEPHONE (OUTPATIENT)
Dept: FAMILY MEDICINE CLINIC | Age: 44
End: 2024-04-17

## 2024-04-29 DIAGNOSIS — R55 NEUROCARDIOGENIC SYNCOPE: ICD-10-CM

## 2024-04-30 NOTE — TELEPHONE ENCOUNTER
Roxana called requesting a refill of the below medication which has been pended for you:     Requested Prescriptions     Pending Prescriptions Disp Refills    metoprolol tartrate (LOPRESSOR) 25 MG tablet [Pharmacy Med Name: METOPROLOL TARTRATE 25 MG TAB] 360 tablet 1     Sig: TAKE 2 TABLETS BY MOUTH TWICE A DAY       Last Appointment Date: 3/15/2024  Next Appointment Date: 7/15/2024    Allergies   Allergen Reactions    Amoxicillin Nausea Only     GI UPSET    Other      seaosanal allergies      Pcn [Penicillins] Nausea Only     GI UPSET

## 2024-07-05 DIAGNOSIS — J44.1 ACUTE EXACERBATION OF CHRONIC OBSTRUCTIVE PULMONARY DISEASE (COPD) (HCC): ICD-10-CM

## 2024-07-05 RX ORDER — FLUTICASONE FUROATE, UMECLIDINIUM BROMIDE AND VILANTEROL TRIFENATATE 100; 62.5; 25 UG/1; UG/1; UG/1
POWDER RESPIRATORY (INHALATION)
Qty: 1 EACH | Refills: 5 | Status: SHIPPED | OUTPATIENT
Start: 2024-07-05

## 2024-07-09 DIAGNOSIS — J32.4 CHRONIC PANSINUSITIS: ICD-10-CM

## 2024-07-09 NOTE — TELEPHONE ENCOUNTER
Roxana called requesting a refill of the below medication which has been pended for you:     Requested Prescriptions     Pending Prescriptions Disp Refills    montelukast (SINGULAIR) 10 MG tablet [Pharmacy Med Name: MONTELUKAST SOD 10 MG TABLET] 90 tablet 1     Sig: TAKE ONE TABLET BY MOUTH ONCE NIGHTLY       Last Appointment Date: 3/15/2024  Next Appointment Date: Visit date not found    Allergies   Allergen Reactions    Amoxicillin Nausea Only     GI UPSET    Other      seaosanal allergies      Pcn [Penicillins] Nausea Only     GI UPSET

## 2024-07-10 RX ORDER — MONTELUKAST SODIUM 10 MG/1
10 TABLET ORAL NIGHTLY
Qty: 90 TABLET | Refills: 0 | Status: SHIPPED | OUTPATIENT
Start: 2024-07-10

## 2024-07-15 DIAGNOSIS — K21.9 GASTROESOPHAGEAL REFLUX DISEASE, UNSPECIFIED WHETHER ESOPHAGITIS PRESENT: ICD-10-CM

## 2024-07-15 DIAGNOSIS — F41.8 ANXIETY WITH DEPRESSION: ICD-10-CM

## 2024-07-15 RX ORDER — OMEPRAZOLE 40 MG/1
40 CAPSULE, DELAYED RELEASE ORAL 2 TIMES DAILY
Qty: 180 CAPSULE | Refills: 0 | Status: SHIPPED | OUTPATIENT
Start: 2024-07-15

## 2024-07-15 RX ORDER — BUSPIRONE HYDROCHLORIDE 10 MG/1
TABLET ORAL
Qty: 180 TABLET | Refills: 0 | Status: SHIPPED | OUTPATIENT
Start: 2024-07-15

## 2024-07-15 NOTE — TELEPHONE ENCOUNTER
Roxana called requesting a refill of the below medication which has been pended for you:     Requested Prescriptions     Pending Prescriptions Disp Refills    omeprazole (PRILOSEC) 40 MG delayed release capsule [Pharmacy Med Name: OMEPRAZOLE DR 40 MG CAPSULE] 180 capsule 0     Sig: TAKE 1 CAPSULE BY MOUTH 2 TIMES A DAY    busPIRone (BUSPAR) 10 MG tablet [Pharmacy Med Name: BUSPIRONE HCL 10 MG TABLET] 180 tablet 0     Sig: TAKE 1 TABLET BY MOUTH 2 TIMES A DAY       Last Appointment Date: 3/15/2024  Next Appointment Date: Visit date not found    Allergies   Allergen Reactions    Amoxicillin Nausea Only     GI UPSET    Other      seaosanal allergies      Pcn [Penicillins] Nausea Only     GI UPSET

## 2024-07-24 DIAGNOSIS — R55 NEUROCARDIOGENIC SYNCOPE: ICD-10-CM

## 2024-07-24 RX ORDER — MIDODRINE HYDROCHLORIDE 5 MG/1
TABLET ORAL
Qty: 90 TABLET | Refills: 0 | Status: SHIPPED | OUTPATIENT
Start: 2024-07-24

## 2024-07-24 NOTE — TELEPHONE ENCOUNTER
Roxana called requesting a refill of the below medication which has been pended for you:     Requested Prescriptions     Pending Prescriptions Disp Refills    midodrine (PROAMATINE) 5 MG tablet 270 tablet 1     Sig: TAKE ONE TABLET BY MOUTH THREE TIMES A DAY       Last Appointment Date: 3/15/2024  Next Appointment Date: 8/7/2024    Allergies   Allergen Reactions    Amoxicillin Nausea Only     GI UPSET    Other      seaosanal allergies      Pcn [Penicillins] Nausea Only     GI UPSET

## 2024-08-07 ENCOUNTER — COMMUNITY OUTREACH (OUTPATIENT)
Dept: FAMILY MEDICINE CLINIC | Age: 44
End: 2024-08-07

## 2024-09-07 DIAGNOSIS — R55 NEUROCARDIOGENIC SYNCOPE: ICD-10-CM

## 2024-09-11 RX ORDER — MIDODRINE HYDROCHLORIDE 5 MG/1
TABLET ORAL
Qty: 90 TABLET | Refills: 0 | Status: SHIPPED | OUTPATIENT
Start: 2024-09-11

## 2024-09-30 ENCOUNTER — HOSPITAL ENCOUNTER (OUTPATIENT)
Age: 44
Discharge: HOME | End: 2024-09-30
Payer: COMMERCIAL

## 2024-09-30 ENCOUNTER — OFFICE VISIT (OUTPATIENT)
Dept: FAMILY MEDICINE CLINIC | Age: 44
End: 2024-09-30
Payer: COMMERCIAL

## 2024-09-30 VITALS
BODY MASS INDEX: 28.12 KG/M2 | WEIGHT: 152.8 LBS | RESPIRATION RATE: 16 BRPM | OXYGEN SATURATION: 94 % | SYSTOLIC BLOOD PRESSURE: 106 MMHG | HEART RATE: 77 BPM | DIASTOLIC BLOOD PRESSURE: 64 MMHG | HEIGHT: 62 IN

## 2024-09-30 DIAGNOSIS — Z72.0 TOBACCO ABUSE: ICD-10-CM

## 2024-09-30 DIAGNOSIS — K21.9 GASTROESOPHAGEAL REFLUX DISEASE, UNSPECIFIED WHETHER ESOPHAGITIS PRESENT: ICD-10-CM

## 2024-09-30 DIAGNOSIS — R41.3 TRANSIENT AMNESIA: ICD-10-CM

## 2024-09-30 DIAGNOSIS — R19.06 EPIGASTRIC FULLNESS: Primary | ICD-10-CM

## 2024-09-30 DIAGNOSIS — F41.8 ANXIETY WITH DEPRESSION: ICD-10-CM

## 2024-09-30 DIAGNOSIS — E78.2 MIXED HYPERLIPIDEMIA: ICD-10-CM

## 2024-09-30 DIAGNOSIS — Z12.31 ENCOUNTER FOR SCREENING MAMMOGRAM FOR MALIGNANT NEOPLASM OF BREAST: ICD-10-CM

## 2024-09-30 DIAGNOSIS — J41.1 MUCOPURULENT CHRONIC BRONCHITIS (HCC): ICD-10-CM

## 2024-09-30 DIAGNOSIS — R19.06 EPIGASTRIC FULLNESS: ICD-10-CM

## 2024-09-30 LAB
ALBUMIN SERPL-MCNC: 4.7 G/DL (ref 3.5–5.2)
ALBUMIN/GLOB SERPL: 1.9 {RATIO} (ref 1–2.5)
ALP SERPL-CCNC: 110 U/L (ref 35–104)
ALT SERPL-CCNC: 13 U/L (ref 5–33)
ANION GAP SERPL CALCULATED.3IONS-SCNC: 10 MMOL/L (ref 9–17)
AST SERPL-CCNC: 19 U/L
BASOPHILS # BLD: 0.1 K/UL (ref 0–0.2)
BASOPHILS NFR BLD: 1 % (ref 0–2)
BILIRUB SERPL-MCNC: 0.2 MG/DL (ref 0.3–1.2)
BUN SERPL-MCNC: 8 MG/DL (ref 6–20)
BUN/CREAT SERPL: 8 (ref 9–20)
CALCIUM SERPL-MCNC: 9.5 MG/DL (ref 8.6–10.4)
CHLORIDE SERPL-SCNC: 105 MMOL/L (ref 98–107)
CHOLEST SERPL-MCNC: 255 MG/DL (ref 0–199)
CHOLESTEROL/HDL RATIO: 7
CO2 SERPL-SCNC: 27 MMOL/L (ref 20–31)
CREAT SERPL-MCNC: 1 MG/DL (ref 0.5–0.9)
EOSINOPHIL # BLD: 0.41 K/UL (ref 0–0.44)
EOSINOPHILS RELATIVE PERCENT: 3 % (ref 1–4)
ERYTHROCYTE [DISTWIDTH] IN BLOOD BY AUTOMATED COUNT: 13.4 % (ref 11.8–14.4)
GFR, ESTIMATED: 70 ML/MIN/1.73M2
GLUCOSE SERPL-MCNC: 96 MG/DL (ref 70–99)
HCT VFR BLD AUTO: 38.5 % (ref 36.3–47.1)
HDLC SERPL-MCNC: 37 MG/DL
HGB BLD-MCNC: 12.7 G/DL (ref 11.9–15.1)
IMM GRANULOCYTES # BLD AUTO: 0.08 K/UL (ref 0–0.3)
IMM GRANULOCYTES NFR BLD: 1 %
LDLC SERPL CALC-MCNC: 173 MG/DL (ref 0–100)
LYMPHOCYTES NFR BLD: 3.28 K/UL (ref 1.1–3.7)
LYMPHOCYTES RELATIVE PERCENT: 20 % (ref 24–43)
MCH RBC QN AUTO: 30.1 PG (ref 25.2–33.5)
MCHC RBC AUTO-ENTMCNC: 33 G/DL (ref 25.2–33.5)
MCV RBC AUTO: 91.2 FL (ref 82.6–102.9)
MONOCYTES NFR BLD: 1.02 K/UL (ref 0.1–1.2)
MONOCYTES NFR BLD: 6 % (ref 3–12)
NEUTROPHILS NFR BLD: 69 % (ref 36–65)
NEUTS SEG NFR BLD: 11.56 K/UL (ref 1.5–8.1)
NRBC BLD-RTO: 0 PER 100 WBC
PLATELET # BLD AUTO: 310 K/UL (ref 138–453)
PMV BLD AUTO: 10.8 FL (ref 8.1–13.5)
POTASSIUM SERPL-SCNC: 4.7 MMOL/L (ref 3.7–5.3)
PROT SERPL-MCNC: 7.2 G/DL (ref 6.4–8.3)
RBC # BLD AUTO: 4.22 M/UL (ref 3.95–5.11)
SODIUM SERPL-SCNC: 142 MMOL/L (ref 135–144)
TRIGL SERPL-MCNC: 224 MG/DL
VLDLC SERPL CALC-MCNC: 45 MG/DL
WBC OTHER # BLD: 16.5 K/UL (ref 3.5–11.3)

## 2024-09-30 PROCEDURE — 80053 COMPREHEN METABOLIC PANEL: CPT

## 2024-09-30 PROCEDURE — 36415 COLL VENOUS BLD VENIPUNCTURE: CPT

## 2024-09-30 PROCEDURE — 85025 COMPLETE CBC W/AUTO DIFF WBC: CPT

## 2024-09-30 PROCEDURE — 99214 OFFICE O/P EST MOD 30 MIN: CPT | Performed by: NURSE PRACTITIONER

## 2024-09-30 PROCEDURE — 80061 LIPID PANEL: CPT

## 2024-09-30 RX ORDER — SIMVASTATIN 20 MG
TABLET ORAL
Qty: 90 TABLET | Refills: 1 | Status: SHIPPED | OUTPATIENT
Start: 2024-09-30

## 2024-09-30 RX ORDER — NICOTINE 21 MG/24HR
1 PATCH, TRANSDERMAL 24 HOURS TRANSDERMAL DAILY
Qty: 30 PATCH | Refills: 0 | Status: SHIPPED | OUTPATIENT
Start: 2024-09-30 | End: 2024-10-30

## 2024-09-30 RX ORDER — SUCRALFATE 1 G/1
1 TABLET ORAL
Qty: 90 TABLET | Refills: 2 | Status: SHIPPED | OUTPATIENT
Start: 2024-09-30

## 2024-09-30 RX ORDER — ALPRAZOLAM 0.5 MG
0.5 TABLET ORAL 3 TIMES DAILY PRN
Qty: 30 TABLET | Refills: 0 | Status: SHIPPED | OUTPATIENT
Start: 2024-09-30 | End: 2024-10-10

## 2024-09-30 RX ORDER — METOCLOPRAMIDE 5 MG/1
5 TABLET ORAL 3 TIMES DAILY PRN
Qty: 120 TABLET | Refills: 3 | Status: SHIPPED | OUTPATIENT
Start: 2024-09-30

## 2024-09-30 ASSESSMENT — PATIENT HEALTH QUESTIONNAIRE - PHQ9
10. IF YOU CHECKED OFF ANY PROBLEMS, HOW DIFFICULT HAVE THESE PROBLEMS MADE IT FOR YOU TO DO YOUR WORK, TAKE CARE OF THINGS AT HOME, OR GET ALONG WITH OTHER PEOPLE: NOT DIFFICULT AT ALL
1. LITTLE INTEREST OR PLEASURE IN DOING THINGS: NOT AT ALL
8. MOVING OR SPEAKING SO SLOWLY THAT OTHER PEOPLE COULD HAVE NOTICED. OR THE OPPOSITE, BEING SO FIGETY OR RESTLESS THAT YOU HAVE BEEN MOVING AROUND A LOT MORE THAN USUAL: NOT AT ALL
6. FEELING BAD ABOUT YOURSELF - OR THAT YOU ARE A FAILURE OR HAVE LET YOURSELF OR YOUR FAMILY DOWN: NOT AT ALL
2. FEELING DOWN, DEPRESSED OR HOPELESS: NOT AT ALL
3. TROUBLE FALLING OR STAYING ASLEEP: SEVERAL DAYS
5. POOR APPETITE OR OVEREATING: NOT AT ALL
SUM OF ALL RESPONSES TO PHQ QUESTIONS 1-9: 2
SUM OF ALL RESPONSES TO PHQ QUESTIONS 1-9: 2
7. TROUBLE CONCENTRATING ON THINGS, SUCH AS READING THE NEWSPAPER OR WATCHING TELEVISION: NOT AT ALL
4. FEELING TIRED OR HAVING LITTLE ENERGY: SEVERAL DAYS
9. THOUGHTS THAT YOU WOULD BE BETTER OFF DEAD, OR OF HURTING YOURSELF: NOT AT ALL
SUM OF ALL RESPONSES TO PHQ QUESTIONS 1-9: 2
SUM OF ALL RESPONSES TO PHQ9 QUESTIONS 1 & 2: 0
SUM OF ALL RESPONSES TO PHQ QUESTIONS 1-9: 2

## 2024-09-30 ASSESSMENT — ENCOUNTER SYMPTOMS
VOMITING: 0
ABDOMINAL PAIN: 1
DIARRHEA: 0
CONSTIPATION: 0

## 2024-09-30 NOTE — PROGRESS NOTES
Wheezing 18 g 1    Black Cohosh 40 MG CAPS Take 1 tablet by mouth 2 times daily      famotidine (PEPCID) 40 MG tablet TAKE 1 TABLET BY MOUTH AT BEDTIME 90 tablet 1    ipratropium 0.5 mg-albuterol 2.5 mg (DUONEB) 0.5-2.5 (3) MG/3ML SOLN nebulizer solution Inhale 3 mLs into the lungs every 6 hours as needed for Shortness of Breath or Wheezing 360 mL 2    oxybutynin (DITROPAN XL) 10 MG extended release tablet Take 1 tablet by mouth daily 90 tablet 3    albuterol sulfate HFA (VENTOLIN HFA) 108 (90 Base) MCG/ACT inhaler Inhale 2 puffs into the lungs 4 times daily as needed for Wheezing 18 g 0    desonide (DESOWEN) 0.05 % cream Apply topically 2 times daily. 15 g 2    BLACK COHOSH EXTRACT PO Take 1 tablet by mouth in the morning and at bedtime      vitamin D (CHOLECALCIFEROL) 1000 UNIT TABS tablet Take 2 tablets by mouth daily gummies      Multiple Vitamins-Minerals (MULTIVITAMIN PO) Take 1 tablet by mouth daily.      [DISCONTINUED] nicotine (NICODERM CQ) 14 MG/24HR Place 1 patch onto the skin daily for 14 days (Patient not taking: Reported on 9/30/2024) 14 patch 0    [DISCONTINUED] OXYGEN Inhale 2 L/hr into the lungs as needed for Shortness of Breath (Patient not taking: Reported on 9/30/2024)      Nebulizer System All-In-One MISC Use with duoneb and albuterol (Patient not taking: Reported on 9/30/2024) 1 each 0    Nebulizer System All-In-One MISC Use with albuterol (Patient not taking: Reported on 9/30/2024) 1 each 0    [DISCONTINUED] ketorolac (TORADOL) 30 MG/ML injection Inject 1 mL into the muscle once for 1 dose 1 mL 0    [DISCONTINUED] UNABLE TO FIND Pulse Oximeter (Patient not taking: Reported on 9/30/2024) 1 each 0    [DISCONTINUED] OXYGEN Inhale 2 L/hr into the lungs continuous Adjust to keep sat 90-95%  DX: J44.1 (Patient not taking: Reported on 9/30/2024) 3 Container 0    [DISCONTINUED] Oxygen Concentrator 2 liters continuous and ambulatory, needs portable tank also. Adjust to keep sat 90-95%. DX: J44.1

## 2024-10-06 DIAGNOSIS — J32.4 CHRONIC PANSINUSITIS: ICD-10-CM

## 2024-10-07 RX ORDER — MONTELUKAST SODIUM 10 MG/1
10 TABLET ORAL NIGHTLY
Qty: 90 TABLET | Refills: 0 | Status: SHIPPED | OUTPATIENT
Start: 2024-10-07

## 2024-10-07 NOTE — TELEPHONE ENCOUNTER
Roxana called requesting a refill of the below medication which has been pended for you:     Requested Prescriptions     Pending Prescriptions Disp Refills    montelukast (SINGULAIR) 10 MG tablet [Pharmacy Med Name: MONTELUKAST SOD 10 MG TABLET] 90 tablet 0     Sig: TAKE ONE TABLET BY MOUTH ONCE NIGHTLY       Last Appointment Date: 9/30/2024  Next Appointment Date: 1/30/2025    Allergies   Allergen Reactions    Amoxicillin Nausea Only     GI UPSET    Other      seaosanal allergies      Pcn [Penicillins] Nausea Only     GI UPSET

## 2024-10-08 DIAGNOSIS — D72.829 LEUKOCYTOSIS, UNSPECIFIED TYPE: Primary | ICD-10-CM

## 2024-10-10 ENCOUNTER — OFFICE VISIT (OUTPATIENT)
Age: 44
End: 2024-10-10
Payer: COMMERCIAL

## 2024-10-10 VITALS
OXYGEN SATURATION: 99 % | HEART RATE: 77 BPM | HEIGHT: 62 IN | SYSTOLIC BLOOD PRESSURE: 112 MMHG | WEIGHT: 151.8 LBS | RESPIRATION RATE: 16 BRPM | BODY MASS INDEX: 27.94 KG/M2 | DIASTOLIC BLOOD PRESSURE: 62 MMHG | TEMPERATURE: 97.2 F

## 2024-10-10 DIAGNOSIS — Z72.0 TOBACCO ABUSE: ICD-10-CM

## 2024-10-10 DIAGNOSIS — R55 NEUROCARDIOGENIC SYNCOPE: ICD-10-CM

## 2024-10-10 DIAGNOSIS — D72.829 LEUKOCYTOSIS, UNSPECIFIED TYPE: Primary | ICD-10-CM

## 2024-10-10 DIAGNOSIS — R53.82 CHRONIC FATIGUE: ICD-10-CM

## 2024-10-10 PROCEDURE — 99204 OFFICE O/P NEW MOD 45 MIN: CPT | Performed by: INTERNAL MEDICINE

## 2024-10-10 NOTE — PROGRESS NOTES
Writer sent request for siParadigm per providers instructions  
imaging studies and other relevant clinical data.   reviewed previous medical records  Discussed differential diagnosis of leukocytosis which can be reactive in nature as well as due to primary bone marrow disorder  Will obtain peripheral smear.  Will check inflammatory markers  Will also order testing to evaluate for MPN.  Patient is a smoker that can lead to leukocytosis.  Patient counseled tobacco cessation  No obvious B symptoms noted.  No constitutional symptoms noted  Clinic to review results with questions which answered to the best of ability.      EVAN HERNANDEZ MD    This note is created with the assistance of a speech recognition program.  While intending to generate a document that actually reflects the content of the visit, the document can still have some errors including those of syntax and sound a like substitutions which may escape proof reading.  It such instances, actual meaning can be extrapolated by contextual diversion.

## 2024-10-10 NOTE — PATIENT INSTRUCTIONS
Labs including paradigm atleat 2 weeks before rv   Rv in novemeber in [facility]

## 2024-10-18 DIAGNOSIS — R55 NEUROCARDIOGENIC SYNCOPE: ICD-10-CM

## 2024-10-18 RX ORDER — MIDODRINE HYDROCHLORIDE 5 MG/1
TABLET ORAL
Qty: 90 TABLET | Refills: 2 | Status: SHIPPED | OUTPATIENT
Start: 2024-10-18

## 2024-10-18 NOTE — TELEPHONE ENCOUNTER
Roxana called requesting a refill of the below medication which has been pended for you:     Requested Prescriptions     Pending Prescriptions Disp Refills    midodrine (PROAMATINE) 5 MG tablet 90 tablet 0     Sig: TAKE 1 TABLET BY MOUTH 3 TIMES A DAY       Last Appointment Date: 9/30/2024  Next Appointment Date: 1/30/2025    Allergies   Allergen Reactions    Amoxicillin Nausea Only     GI UPSET    Other      seaosanal allergies      Pcn [Penicillins] Nausea Only     GI UPSET

## 2024-10-19 DIAGNOSIS — K21.9 GASTROESOPHAGEAL REFLUX DISEASE, UNSPECIFIED WHETHER ESOPHAGITIS PRESENT: ICD-10-CM

## 2024-10-19 DIAGNOSIS — F41.8 ANXIETY WITH DEPRESSION: ICD-10-CM

## 2024-10-19 DIAGNOSIS — R39.9 LOWER URINARY TRACT SYMPTOMS: ICD-10-CM

## 2024-10-21 RX ORDER — CITALOPRAM HYDROBROMIDE 40 MG/1
TABLET ORAL
Qty: 90 TABLET | Refills: 0 | Status: SHIPPED | OUTPATIENT
Start: 2024-10-21

## 2024-10-21 RX ORDER — BUSPIRONE HYDROCHLORIDE 10 MG/1
TABLET ORAL
Qty: 180 TABLET | Refills: 0 | Status: SHIPPED | OUTPATIENT
Start: 2024-10-21

## 2024-10-21 RX ORDER — OMEPRAZOLE 40 MG/1
40 CAPSULE, DELAYED RELEASE ORAL 2 TIMES DAILY
Qty: 180 CAPSULE | Refills: 0 | Status: SHIPPED | OUTPATIENT
Start: 2024-10-21

## 2024-10-21 RX ORDER — OXYBUTYNIN CHLORIDE 10 MG/1
10 TABLET, EXTENDED RELEASE ORAL DAILY
Qty: 90 TABLET | Refills: 0 | Status: SHIPPED | OUTPATIENT
Start: 2024-10-21

## 2024-10-21 NOTE — TELEPHONE ENCOUNTER
Roxana called requesting a refill of the below medication which has been pended for you:     Requested Prescriptions     Pending Prescriptions Disp Refills    oxyBUTYnin (DITROPAN-XL) 10 MG extended release tablet [Pharmacy Med Name: oxyBUTYnin CL ER 10 MG TABLET] 90 tablet 0     Sig: TAKE 1 TABLET BY MOUTH DAILY    omeprazole (PRILOSEC) 40 MG delayed release capsule [Pharmacy Med Name: OMEPRAZOLE DR 40 MG CAPSULE] 180 capsule 0     Sig: TAKE 1 CAPSULE BY MOUTH 2 TIMES A DAY    citalopram (CELEXA) 40 MG tablet [Pharmacy Med Name: CITALOPRAM HBR 40 MG TABLET] 90 tablet 0     Sig: TAKE 1 TABLET BY MOUTH EVERY MORNING    busPIRone (BUSPAR) 10 MG tablet [Pharmacy Med Name: busPIRone HCL 10 MG TABLET] 180 tablet 0     Sig: TAKE 1 TABLET BY MOUTH 2 TIMES A DAY       Last Appointment Date: 9/30/2024  Next Appointment Date: 1/30/2025    Allergies   Allergen Reactions    Amoxicillin Nausea Only     GI UPSET    Other      seaosanal allergies      Pcn [Penicillins] Nausea Only     GI UPSET

## 2024-10-23 ENCOUNTER — HOSPITAL ENCOUNTER (OUTPATIENT)
Dept: MAMMOGRAPHY | Age: 44
Discharge: HOME | End: 2024-10-25
Payer: COMMERCIAL

## 2024-10-23 VITALS — WEIGHT: 150 LBS | BODY MASS INDEX: 27.88 KG/M2

## 2024-10-23 DIAGNOSIS — Z12.31 ENCOUNTER FOR SCREENING MAMMOGRAM FOR MALIGNANT NEOPLASM OF BREAST: ICD-10-CM

## 2024-10-23 PROCEDURE — 77063 BREAST TOMOSYNTHESIS BI: CPT

## 2024-10-31 ENCOUNTER — HOSPITAL ENCOUNTER (OUTPATIENT)
Age: 44
Discharge: HOME | End: 2024-10-31
Payer: COMMERCIAL

## 2024-10-31 DIAGNOSIS — Z72.0 TOBACCO ABUSE: ICD-10-CM

## 2024-10-31 DIAGNOSIS — D72.829 LEUKOCYTOSIS, UNSPECIFIED TYPE: ICD-10-CM

## 2024-10-31 DIAGNOSIS — R55 NEUROCARDIOGENIC SYNCOPE: ICD-10-CM

## 2024-10-31 DIAGNOSIS — R53.82 CHRONIC FATIGUE: ICD-10-CM

## 2024-10-31 LAB
BASOPHILS # BLD: 0.14 K/UL (ref 0–0.2)
BASOPHILS NFR BLD: 1 % (ref 0–2)
CRP SERPL HS-MCNC: 5.3 MG/L (ref 0–5)
EOSINOPHIL # BLD: 0.47 K/UL (ref 0–0.44)
EOSINOPHILS RELATIVE PERCENT: 3 % (ref 1–4)
ERYTHROCYTE [DISTWIDTH] IN BLOOD BY AUTOMATED COUNT: 13.2 % (ref 11.8–14.4)
HCT VFR BLD AUTO: 39.1 % (ref 36.3–47.1)
HGB BLD-MCNC: 12.8 G/DL (ref 11.9–15.1)
IMM GRANULOCYTES # BLD AUTO: 0.07 K/UL (ref 0–0.3)
IMM GRANULOCYTES NFR BLD: 0 %
IMM RETICS NFR: 16.4 % (ref 2.7–18.3)
LYMPHOCYTES NFR BLD: 3.49 K/UL (ref 1.1–3.7)
LYMPHOCYTES RELATIVE PERCENT: 22 % (ref 24–43)
MCH RBC QN AUTO: 29.6 PG (ref 25.2–33.5)
MCHC RBC AUTO-ENTMCNC: 32.7 G/DL (ref 28.4–34.8)
MCV RBC AUTO: 90.5 FL (ref 82.6–102.9)
MONOCYTES NFR BLD: 0.96 K/UL (ref 0.1–1.2)
MONOCYTES NFR BLD: 6 % (ref 3–12)
NEUTROPHILS NFR BLD: 68 % (ref 36–65)
NEUTS SEG NFR BLD: 11.12 K/UL (ref 1.5–8.1)
NRBC BLD-RTO: 0 PER 100 WBC
RBC # BLD AUTO: 4.26 M/UL (ref 3.95–5.11)
RETIC HEMOGLOBIN: 32.4 PG (ref 28.2–35.7)
RETICS # AUTO: 0.08 M/UL (ref 0.03–0.08)
RETICS/RBC NFR AUTO: 1.8 % (ref 0.5–1.9)
WBC OTHER # BLD: 16.5 K/UL (ref 3.5–11.3)

## 2024-10-31 PROCEDURE — 36415 COLL VENOUS BLD VENIPUNCTURE: CPT

## 2024-10-31 PROCEDURE — 86140 C-REACTIVE PROTEIN: CPT

## 2024-10-31 PROCEDURE — 85025 COMPLETE CBC W/AUTO DIFF WBC: CPT

## 2024-10-31 PROCEDURE — 85045 AUTOMATED RETICULOCYTE COUNT: CPT

## 2024-11-01 LAB
PATH REV BLD -IMP: NORMAL
SURGICAL PATHOLOGY REPORT: NORMAL

## 2024-11-09 DIAGNOSIS — R55 NEUROCARDIOGENIC SYNCOPE: ICD-10-CM

## 2024-11-11 RX ORDER — METOPROLOL TARTRATE 25 MG/1
50 TABLET, FILM COATED ORAL 2 TIMES DAILY
Qty: 120 TABLET | Refills: 0 | Status: SHIPPED | OUTPATIENT
Start: 2024-11-11

## 2024-11-11 NOTE — TELEPHONE ENCOUNTER
Roxana called requesting a refill of the below medication which has been pended for you:     Requested Prescriptions     Pending Prescriptions Disp Refills    metoprolol tartrate (LOPRESSOR) 25 MG tablet [Pharmacy Med Name: METOPROLOL TARTRATE 25 MG TAB] 360 tablet 0     Sig: TAKE 2 TABLETS BY MOUTH TWICE A DAY       Last Appointment Date: 9/30/2024  Next Appointment Date: 1/30/2025    Allergies   Allergen Reactions    Amoxicillin Nausea Only     GI UPSET    Other      seaosanal allergies      Pcn [Penicillins] Nausea Only     GI UPSET

## 2024-11-14 DIAGNOSIS — F41.8 ANXIETY WITH DEPRESSION: ICD-10-CM

## 2024-11-14 RX ORDER — ALPRAZOLAM 0.5 MG
0.5 TABLET ORAL 3 TIMES DAILY PRN
Qty: 30 TABLET | Refills: 0 | Status: SHIPPED | OUTPATIENT
Start: 2024-11-14 | End: 2024-11-24

## 2024-11-14 NOTE — TELEPHONE ENCOUNTER
PCP out of office    Roxana called requesting a refill of the below medication which has been pended for you:     Requested Prescriptions     Pending Prescriptions Disp Refills    ALPRAZolam (XANAX) 0.5 MG tablet 30 tablet 0     Sig: Take 1 tablet by mouth 3 times daily as needed for Sleep or Anxiety for up to 10 days. Max Daily Amount: 1.5 mg       Last Appointment Date: 9/30/2024  Next Appointment Date: 11/27/2024    Allergies   Allergen Reactions    Amoxicillin Nausea Only     GI UPSET    Other      seaosanal allergies      Pcn [Penicillins] Nausea Only     GI UPSET

## 2024-11-14 NOTE — TELEPHONE ENCOUNTER
Pt calling to get refill on xanax. Also wanted another appt scheduled to discuss panic attacks and severe anxiety. Got that booked for 11/27 at 740a. Please advise.

## 2024-11-19 ENCOUNTER — OFFICE VISIT (OUTPATIENT)
Dept: ONCOLOGY | Age: 44
End: 2024-11-19
Payer: COMMERCIAL

## 2024-11-19 VITALS
HEART RATE: 78 BPM | DIASTOLIC BLOOD PRESSURE: 62 MMHG | SYSTOLIC BLOOD PRESSURE: 114 MMHG | HEIGHT: 62 IN | WEIGHT: 149.4 LBS | OXYGEN SATURATION: 99 % | BODY MASS INDEX: 27.49 KG/M2

## 2024-11-19 DIAGNOSIS — D72.829 LEUKOCYTOSIS, UNSPECIFIED TYPE: Primary | ICD-10-CM

## 2024-11-19 DIAGNOSIS — Z72.0 TOBACCO ABUSE: ICD-10-CM

## 2024-11-19 PROCEDURE — 99213 OFFICE O/P EST LOW 20 MIN: CPT | Performed by: INTERNAL MEDICINE

## 2024-11-19 NOTE — PROGRESS NOTES
believe patient's leukocytosis is reactive in nature.  Smoking also contributing to leukocytosis.  Patient counseled tobacco cessation  Patient given printout of NGS testing results.  Return to clinic on as-needed basis.      EVAN HERNANDEZ MD    This note is created with the assistance of a speech recognition program.  While intending to generate a document that actually reflects the content of the visit, the document can still have some errors including those of syntax and sound a like substitutions which may escape proof reading.  It such instances, actual meaning can be extrapolated by contextual diversion.

## 2024-12-09 DIAGNOSIS — R55 NEUROCARDIOGENIC SYNCOPE: ICD-10-CM

## 2024-12-09 RX ORDER — METOPROLOL TARTRATE 25 MG/1
50 TABLET, FILM COATED ORAL 2 TIMES DAILY
Qty: 120 TABLET | Refills: 2 | Status: SHIPPED | OUTPATIENT
Start: 2024-12-09

## 2024-12-09 NOTE — TELEPHONE ENCOUNTER
Roxana called requesting a refill of the below medication which has been pended for you:     Requested Prescriptions     Pending Prescriptions Disp Refills    metoprolol tartrate (LOPRESSOR) 25 MG tablet 120 tablet 2     Sig: Take 2 tablets by mouth 2 times daily       Last Appointment Date: 9/30/2024  Next Appointment Date: 1/30/2025    Allergies   Allergen Reactions    Amoxicillin Nausea Only     GI UPSET    Other      seaosanal allergies      Pcn [Penicillins] Nausea Only     GI UPSET

## 2025-01-09 DIAGNOSIS — J32.4 CHRONIC PANSINUSITIS: ICD-10-CM

## 2025-01-09 RX ORDER — MONTELUKAST SODIUM 10 MG/1
10 TABLET ORAL NIGHTLY
Qty: 90 TABLET | Refills: 1 | Status: SHIPPED | OUTPATIENT
Start: 2025-01-09

## 2025-01-09 NOTE — TELEPHONE ENCOUNTER
Roxana called requesting a refill of the below medication which has been pended for you:     Requested Prescriptions     Pending Prescriptions Disp Refills    montelukast (SINGULAIR) 10 MG tablet [Pharmacy Med Name: MONTELUKAST SOD 10 MG TABLET] 90 tablet 1     Sig: TAKE ONE TABLET BY MOUTH ONCE NIGHTLY       Last Appointment Date: 9/30/2024  Next Appointment Date: 1/30/2025    Allergies   Allergen Reactions    Amoxicillin Nausea Only     GI UPSET    Other      seaosanal allergies      Pcn [Penicillins] Nausea Only     GI UPSET

## 2025-01-19 DIAGNOSIS — J44.1 ACUTE EXACERBATION OF CHRONIC OBSTRUCTIVE PULMONARY DISEASE (COPD) (HCC): ICD-10-CM

## 2025-01-20 RX ORDER — FLUTICASONE FUROATE, UMECLIDINIUM BROMIDE AND VILANTEROL TRIFENATATE 100; 62.5; 25 UG/1; UG/1; UG/1
POWDER RESPIRATORY (INHALATION)
Qty: 1 EACH | Refills: 2 | Status: SHIPPED | OUTPATIENT
Start: 2025-01-20

## 2025-01-20 NOTE — TELEPHONE ENCOUNTER
Roxana called requesting a refill of the below medication which has been pended for you:     Requested Prescriptions     Pending Prescriptions Disp Refills    TRELEGY ELLIPTA 100-62.5-25 MCG/ACT AEPB inhaler [Pharmacy Med Name: TRELEGY ELLIPTA 100-62.5-25]       Sig: INHALE 1 PUFF BY MOUTH DAILY       Last Appointment Date: 9/30/2024  Next Appointment Date: 1/30/2025    Allergies   Allergen Reactions    Amoxicillin Nausea Only     GI UPSET    Other      seaosanal allergies      Pcn [Penicillins] Nausea Only     GI UPSET

## 2025-01-30 ENCOUNTER — TELEPHONE (OUTPATIENT)
Dept: FAMILY MEDICINE CLINIC | Age: 45
End: 2025-01-30

## 2025-01-30 NOTE — TELEPHONE ENCOUNTER
Attempted to reach patient regarding missed appointment on 01/30/2025. Unable to contact at this time. Unable to leave message. Letter mailed to reschedule.

## 2025-02-11 DIAGNOSIS — K21.9 GASTROESOPHAGEAL REFLUX DISEASE, UNSPECIFIED WHETHER ESOPHAGITIS PRESENT: ICD-10-CM

## 2025-02-11 RX ORDER — OMEPRAZOLE 40 MG/1
40 CAPSULE, DELAYED RELEASE ORAL 2 TIMES DAILY
Qty: 180 CAPSULE | Refills: 0 | OUTPATIENT
Start: 2025-02-11

## 2025-02-14 ENCOUNTER — ENROLLMENT (OUTPATIENT)
Dept: CARE COORDINATION | Age: 45
End: 2025-02-14

## 2025-02-14 ENCOUNTER — CARE COORDINATION (OUTPATIENT)
Dept: CARE COORDINATION | Age: 45
End: 2025-02-14

## 2025-02-14 NOTE — CARE COORDINATION
Ambulatory Care Coordination Note     2/14/2025 2:05 PM     Patient outreach attempt by this ACM today to offer care management services. ACM was unable to reach the patient by telephone today;   left voice message requesting a return phone call to this ACM.     ACM: Arianna Bocanegra, RN     Care Summary Note:     Roxana was referred for ACM from PCP.   She missed apt with PCP 1/30/2025- letter mailed.     2/14/2025- 2:06 pm  Left HIPAA compliant voice message requesting return call @ [phone].       PCP/Specialist follow up:       Follow Up:   Plan for next ACM outreach in approximately 1 week to complete:  - outreach attempt to offer care management services  Scheduled f/u with PCP  .

## 2025-02-17 DIAGNOSIS — F41.8 ANXIETY WITH DEPRESSION: ICD-10-CM

## 2025-02-17 NOTE — TELEPHONE ENCOUNTER
Patient due for OV    Roxana called requesting a refill of the below medication which has been pended for you:     Requested Prescriptions     Pending Prescriptions Disp Refills    citalopram (CELEXA) 40 MG tablet [Pharmacy Med Name: CITALOPRAM HBR 40 MG TABLET] 90 tablet 0     Sig: TAKE 1 TABLET BY MOUTH EVERY MORNING       Last Appointment Date: 9/30/2024  Next Appointment Date: Visit date not found    Allergies   Allergen Reactions    Amoxicillin Nausea Only     GI UPSET    Other      seaosanal allergies      Pcn [Penicillins] Nausea Only     GI UPSET      no...

## 2025-02-19 ENCOUNTER — CARE COORDINATION (OUTPATIENT)
Dept: CARE COORDINATION | Age: 45
End: 2025-02-19

## 2025-02-19 RX ORDER — CITALOPRAM HYDROBROMIDE 40 MG/1
TABLET ORAL
Qty: 30 TABLET | Refills: 0 | Status: SHIPPED | OUTPATIENT
Start: 2025-02-19

## 2025-02-19 NOTE — CARE COORDINATION
Ambulatory Care Coordination Note     2/19/2025 9:40 AM     patient outreach attempt by this ACM today to offer care management services. ACM was unable to reach the patient by telephone today;   left voice message requesting a return phone call to this ACM.    Roxana was referred for ACM from PCP.   She missed apt with PCP 1/30/2025- letter mailed.      2/14/2025- 2:06 pm  Left HIPAA compliant voice message requesting return call @ [phone].   2/19/2025- 9:41 am  Left final HIPAA compliant voice message requesting return call @ [phone]. Included PCP office number also.        Patient closed (unable to reach patient) from the High Risk Care Management program on 2/19/2025.  Care management goals have been completed. No further Ambulatory Care Manager follow up scheduled.

## 2025-03-21 DIAGNOSIS — F41.8 ANXIETY WITH DEPRESSION: ICD-10-CM

## 2025-03-21 RX ORDER — CITALOPRAM HYDROBROMIDE 40 MG/1
40 TABLET ORAL EVERY MORNING
Qty: 90 TABLET | Refills: 0 | Status: SHIPPED | OUTPATIENT
Start: 2025-03-21

## 2025-03-21 NOTE — TELEPHONE ENCOUNTER
Roxana called requesting a refill of the below medication which has been pended for you:     Requested Prescriptions     Pending Prescriptions Disp Refills    citalopram (CELEXA) 40 MG tablet [Pharmacy Med Name: CITALOPRAM HBR 40 MG TABLET] 90 tablet 0     Sig: TAKE 1 TABLET BY MOUTH EVERY MORNING       Last Appointment Date: 9/30/2024  Next Appointment Date: Visit date not found    Allergies   Allergen Reactions    Amoxicillin Nausea Only     GI UPSET    Other      seaosanal allergies      Pcn [Penicillins] Nausea Only     GI UPSET

## 2025-03-29 DIAGNOSIS — K21.9 GASTROESOPHAGEAL REFLUX DISEASE, UNSPECIFIED WHETHER ESOPHAGITIS PRESENT: ICD-10-CM

## 2025-03-31 RX ORDER — OMEPRAZOLE 40 MG/1
40 CAPSULE, DELAYED RELEASE ORAL 2 TIMES DAILY
Qty: 180 CAPSULE | Refills: 1 | Status: SHIPPED | OUTPATIENT
Start: 2025-03-31

## 2025-03-31 NOTE — TELEPHONE ENCOUNTER
Roxana called requesting a refill of the below medication which has been pended for you:     Requested Prescriptions     Pending Prescriptions Disp Refills    omeprazole (PRILOSEC) 40 MG delayed release capsule [Pharmacy Med Name: OMEPRAZOLE DR 40 MG CAPSULE] 180 capsule 1     Sig: TAKE 1 CAPSULE BY MOUTH 2 TIMES A DAY       Last Appointment Date: 9/30/2024  Next Appointment Date: 4/3/2025    Allergies   Allergen Reactions    Amoxicillin Nausea Only     GI UPSET    Other      seaosanal allergies      Pcn [Penicillins] Nausea Only     GI UPSET

## 2025-04-03 ENCOUNTER — OFFICE VISIT (OUTPATIENT)
Dept: FAMILY MEDICINE CLINIC | Age: 45
End: 2025-04-03

## 2025-04-03 ENCOUNTER — TELEPHONE (OUTPATIENT)
Dept: SURGERY | Age: 45
End: 2025-04-03

## 2025-04-03 VITALS
WEIGHT: 145 LBS | DIASTOLIC BLOOD PRESSURE: 72 MMHG | RESPIRATION RATE: 16 BRPM | BODY MASS INDEX: 26.68 KG/M2 | SYSTOLIC BLOOD PRESSURE: 108 MMHG | OXYGEN SATURATION: 97 % | HEIGHT: 62 IN | HEART RATE: 114 BPM

## 2025-04-03 DIAGNOSIS — K21.9 GASTROESOPHAGEAL REFLUX DISEASE, UNSPECIFIED WHETHER ESOPHAGITIS PRESENT: ICD-10-CM

## 2025-04-03 DIAGNOSIS — J41.1 MUCOPURULENT CHRONIC BRONCHITIS (HCC): ICD-10-CM

## 2025-04-03 DIAGNOSIS — F41.8 ANXIETY WITH DEPRESSION: ICD-10-CM

## 2025-04-03 DIAGNOSIS — J96.11 CHRONIC RESPIRATORY FAILURE WITH HYPOXIA: ICD-10-CM

## 2025-04-03 DIAGNOSIS — Z12.11 COLON CANCER SCREENING: ICD-10-CM

## 2025-04-03 DIAGNOSIS — G43.009 MIGRAINE WITHOUT AURA AND WITHOUT STATUS MIGRAINOSUS, NOT INTRACTABLE: Primary | ICD-10-CM

## 2025-04-03 DIAGNOSIS — E78.2 MIXED HYPERLIPIDEMIA: ICD-10-CM

## 2025-04-03 DIAGNOSIS — R39.9 LOWER URINARY TRACT SYMPTOMS: ICD-10-CM

## 2025-04-03 DIAGNOSIS — R19.7 DIARRHEA, UNSPECIFIED TYPE: ICD-10-CM

## 2025-04-03 DIAGNOSIS — R55 NEUROCARDIOGENIC SYNCOPE: ICD-10-CM

## 2025-04-03 RX ORDER — AZELASTINE 1 MG/ML
1 SPRAY, METERED NASAL 2 TIMES DAILY
COMMUNITY
Start: 2024-11-06

## 2025-04-03 RX ORDER — BUSPIRONE HYDROCHLORIDE 10 MG/1
TABLET ORAL
Qty: 180 TABLET | Refills: 0 | Status: SHIPPED | OUTPATIENT
Start: 2025-04-03

## 2025-04-03 RX ORDER — MIDODRINE HYDROCHLORIDE 5 MG/1
TABLET ORAL
Qty: 90 TABLET | Refills: 2 | Status: SHIPPED | OUTPATIENT
Start: 2025-04-03

## 2025-04-03 RX ORDER — METOPROLOL TARTRATE 25 MG/1
50 TABLET, FILM COATED ORAL 2 TIMES DAILY
Qty: 120 TABLET | Refills: 2 | Status: SHIPPED | OUTPATIENT
Start: 2025-04-03

## 2025-04-03 RX ORDER — ALPRAZOLAM 0.5 MG
0.5 TABLET ORAL NIGHTLY PRN
COMMUNITY
Start: 2024-11-14

## 2025-04-03 RX ORDER — SIMVASTATIN 20 MG
TABLET ORAL
Qty: 90 TABLET | Refills: 1 | Status: SHIPPED | OUTPATIENT
Start: 2025-04-03

## 2025-04-03 RX ORDER — CEFUROXIME AXETIL 250 MG/1
6 TABLET ORAL
Qty: 15 ML | Refills: 0 | Status: SHIPPED | OUTPATIENT
Start: 2025-04-03

## 2025-04-03 ASSESSMENT — PATIENT HEALTH QUESTIONNAIRE - PHQ9
10. IF YOU CHECKED OFF ANY PROBLEMS, HOW DIFFICULT HAVE THESE PROBLEMS MADE IT FOR YOU TO DO YOUR WORK, TAKE CARE OF THINGS AT HOME, OR GET ALONG WITH OTHER PEOPLE: NOT DIFFICULT AT ALL
1. LITTLE INTEREST OR PLEASURE IN DOING THINGS: NOT AT ALL
4. FEELING TIRED OR HAVING LITTLE ENERGY: SEVERAL DAYS
SUM OF ALL RESPONSES TO PHQ QUESTIONS 1-9: 3
3. TROUBLE FALLING OR STAYING ASLEEP: SEVERAL DAYS
6. FEELING BAD ABOUT YOURSELF - OR THAT YOU ARE A FAILURE OR HAVE LET YOURSELF OR YOUR FAMILY DOWN: NOT AT ALL
2. FEELING DOWN, DEPRESSED OR HOPELESS: SEVERAL DAYS
9. THOUGHTS THAT YOU WOULD BE BETTER OFF DEAD, OR OF HURTING YOURSELF: NOT AT ALL
7. TROUBLE CONCENTRATING ON THINGS, SUCH AS READING THE NEWSPAPER OR WATCHING TELEVISION: NOT AT ALL
8. MOVING OR SPEAKING SO SLOWLY THAT OTHER PEOPLE COULD HAVE NOTICED. OR THE OPPOSITE, BEING SO FIGETY OR RESTLESS THAT YOU HAVE BEEN MOVING AROUND A LOT MORE THAN USUAL: NOT AT ALL
5. POOR APPETITE OR OVEREATING: NOT AT ALL
SUM OF ALL RESPONSES TO PHQ QUESTIONS 1-9: 3

## 2025-04-03 NOTE — PROGRESS NOTES
Subjective:      Patient ID: Roxana Mattson is a 44 y.o. female coming in for   Chief Complaint   Patient presents with    Anxiety     6 months.  Increased anxiety.    Migraine     Increased issues with migraines. Discuss medications    Abdominal Cramping     Cramping/pain comes and goes. Very watery stools. Has been ongoing over the last couple years.      History of Present Illness  The patient is a 44-year-old female who presents to the office for a routine 6-month follow-up. She has a history of chronic bronchitis, COPD, anxiety, and migraines. Recently, she has been experiencing abdominal cramping pain that comes and goes, along with watery stools, which she reports have been ongoing for a couple of years.    Migraines occur approximately once a month and are severe, causing nausea and light sensitivity. She prefers injectable treatments over oral medications due to the rapid onset and intensity of her migraines. Previous use of Imitrex was not effective, but a combination of Toradol and Phenergan, sometimes supplemented with Benadryl, has been beneficial.    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 Her eye specialist, Dr. Durant at the [facility], suggested exploring non-steroidal treatments. However, she finds Trelegy effective for her COPD and is hesitant to switch medications. She continues to use oxygen at home but does not require it regularly and reports feeling significantly better. She will inform the office if a refill of Trelegy is needed.    Currently, she is on Celexa and BuSpar for anxiety, which she finds effective. She becomes very emotional when she runs out of her medication.    Bowel movements are inconsistent, with some days characterized by liquid stools and others by relatively normal stools. She is uncertain about the impact of her diet on this condition, as she attempts to maintain a balance between junk food and healthier options.

## 2025-04-19 ENCOUNTER — HOSPITAL ENCOUNTER (EMERGENCY)
Age: 45
Discharge: HOME | End: 2025-04-19
Attending: STUDENT IN AN ORGANIZED HEALTH CARE EDUCATION/TRAINING PROGRAM
Payer: COMMERCIAL

## 2025-04-19 ENCOUNTER — APPOINTMENT (OUTPATIENT)
Dept: GENERAL RADIOLOGY | Age: 45
End: 2025-04-19
Payer: COMMERCIAL

## 2025-04-19 VITALS
BODY MASS INDEX: 26.95 KG/M2 | WEIGHT: 145 LBS | DIASTOLIC BLOOD PRESSURE: 63 MMHG | RESPIRATION RATE: 24 BRPM | TEMPERATURE: 98.6 F | SYSTOLIC BLOOD PRESSURE: 98 MMHG | HEART RATE: 96 BPM | OXYGEN SATURATION: 90 %

## 2025-04-19 DIAGNOSIS — J40 BRONCHITIS: ICD-10-CM

## 2025-04-19 DIAGNOSIS — J18.9 PNEUMONIA DUE TO INFECTIOUS ORGANISM, UNSPECIFIED LATERALITY, UNSPECIFIED PART OF LUNG: ICD-10-CM

## 2025-04-19 DIAGNOSIS — J44.1 ACUTE EXACERBATION OF CHRONIC OBSTRUCTIVE PULMONARY DISEASE (COPD) (HCC): ICD-10-CM

## 2025-04-19 DIAGNOSIS — J44.1 COPD EXACERBATION (HCC): Primary | ICD-10-CM

## 2025-04-19 LAB
ANION GAP SERPL CALCULATED.3IONS-SCNC: 16 MMOL/L (ref 9–16)
BASOPHILS # BLD: 0.04 K/UL (ref 0–0.2)
BASOPHILS NFR BLD: 0 % (ref 0–2)
BNP SERPL-MCNC: 227 PG/ML (ref 0–125)
BUN SERPL-MCNC: 9 MG/DL (ref 6–20)
BUN/CREAT SERPL: 10 (ref 9–20)
CALCIUM SERPL-MCNC: 8.8 MG/DL (ref 8.6–10.4)
CHLORIDE SERPL-SCNC: 101 MMOL/L (ref 98–107)
CO2 SERPL-SCNC: 21 MMOL/L (ref 20–31)
CREAT SERPL-MCNC: 0.9 MG/DL (ref 0.6–0.9)
EKG ATRIAL RATE: 101 BPM
EKG P AXIS: 58 DEGREES
EKG P-R INTERVAL: 136 MS
EKG Q-T INTERVAL: 346 MS
EKG QRS DURATION: 66 MS
EKG QTC CALCULATION (BAZETT): 448 MS
EKG R AXIS: 61 DEGREES
EKG T AXIS: 64 DEGREES
EKG VENTRICULAR RATE: 101 BPM
EOSINOPHIL # BLD: 0.1 K/UL (ref 0–0.44)
EOSINOPHILS RELATIVE PERCENT: 1 % (ref 1–4)
ERYTHROCYTE [DISTWIDTH] IN BLOOD BY AUTOMATED COUNT: 13.3 % (ref 11.8–14.4)
FLUAV AG SPEC QL: NEGATIVE
FLUBV AG SPEC QL: NEGATIVE
GFR, ESTIMATED: 79 ML/MIN/1.73M2
GLUCOSE SERPL-MCNC: 109 MG/DL (ref 74–99)
HCT VFR BLD AUTO: 41.4 % (ref 36.3–47.1)
HGB BLD-MCNC: 13.7 G/DL (ref 11.9–15.1)
IMM GRANULOCYTES # BLD AUTO: 0.03 K/UL (ref 0–0.3)
IMM GRANULOCYTES NFR BLD: 0 %
LYMPHOCYTES NFR BLD: 1.3 K/UL (ref 1.1–3.7)
LYMPHOCYTES RELATIVE PERCENT: 10 % (ref 24–43)
MCH RBC QN AUTO: 29.5 PG (ref 25.2–33.5)
MCHC RBC AUTO-ENTMCNC: 33.1 G/DL (ref 25.2–33.5)
MCV RBC AUTO: 89.2 FL (ref 82.6–102.9)
MONOCYTES NFR BLD: 0.81 K/UL (ref 0.1–1.2)
MONOCYTES NFR BLD: 6 % (ref 3–12)
NEUTROPHILS NFR BLD: 83 % (ref 36–65)
NEUTS SEG NFR BLD: 10.93 K/UL (ref 1.5–8.1)
NRBC BLD-RTO: 0 PER 100 WBC
PLATELET # BLD AUTO: 246 K/UL (ref 138–453)
PMV BLD AUTO: 11.1 FL (ref 8.1–13.5)
POTASSIUM SERPL-SCNC: 4.3 MMOL/L (ref 3.7–5.3)
RBC # BLD AUTO: 4.64 M/UL (ref 3.95–5.11)
SARS-COV-2 RDRP RESP QL NAA+PROBE: NOT DETECTED
SODIUM SERPL-SCNC: 138 MMOL/L (ref 136–145)
SPECIMEN DESCRIPTION: NORMAL
TROPONIN I SERPL HS-MCNC: <6 NG/L (ref 0–14)
WBC OTHER # BLD: 13.2 K/UL (ref 3.5–11.3)

## 2025-04-19 PROCEDURE — 83880 ASSAY OF NATRIURETIC PEPTIDE: CPT

## 2025-04-19 PROCEDURE — 96375 TX/PRO/DX INJ NEW DRUG ADDON: CPT

## 2025-04-19 PROCEDURE — 93005 ELECTROCARDIOGRAM TRACING: CPT | Performed by: STUDENT IN AN ORGANIZED HEALTH CARE EDUCATION/TRAINING PROGRAM

## 2025-04-19 PROCEDURE — 99285 EMERGENCY DEPT VISIT HI MDM: CPT

## 2025-04-19 PROCEDURE — 6360000002 HC RX W HCPCS: Performed by: STUDENT IN AN ORGANIZED HEALTH CARE EDUCATION/TRAINING PROGRAM

## 2025-04-19 PROCEDURE — 6370000000 HC RX 637 (ALT 250 FOR IP): Performed by: STUDENT IN AN ORGANIZED HEALTH CARE EDUCATION/TRAINING PROGRAM

## 2025-04-19 PROCEDURE — 80048 BASIC METABOLIC PNL TOTAL CA: CPT

## 2025-04-19 PROCEDURE — 87804 INFLUENZA ASSAY W/OPTIC: CPT

## 2025-04-19 PROCEDURE — 71045 X-RAY EXAM CHEST 1 VIEW: CPT

## 2025-04-19 PROCEDURE — 85025 COMPLETE CBC W/AUTO DIFF WBC: CPT

## 2025-04-19 PROCEDURE — 96365 THER/PROPH/DIAG IV INF INIT: CPT

## 2025-04-19 PROCEDURE — 84484 ASSAY OF TROPONIN QUANT: CPT

## 2025-04-19 PROCEDURE — 94640 AIRWAY INHALATION TREATMENT: CPT

## 2025-04-19 PROCEDURE — 87635 SARS-COV-2 COVID-19 AMP PRB: CPT

## 2025-04-19 RX ORDER — PREDNISONE 50 MG/1
50 TABLET ORAL DAILY
Qty: 5 TABLET | Refills: 0 | Status: SHIPPED | OUTPATIENT
Start: 2025-04-19 | End: 2025-04-24

## 2025-04-19 RX ORDER — AZITHROMYCIN 250 MG/1
250 TABLET, FILM COATED ORAL DAILY
Qty: 4 TABLET | Refills: 0 | Status: SHIPPED | OUTPATIENT
Start: 2025-04-19 | End: 2025-04-23

## 2025-04-19 RX ORDER — AZITHROMYCIN 250 MG/1
500 TABLET, FILM COATED ORAL ONCE
Status: COMPLETED | OUTPATIENT
Start: 2025-04-19 | End: 2025-04-19

## 2025-04-19 RX ORDER — METHYLPREDNISOLONE SODIUM SUCCINATE 125 MG/2ML
125 INJECTION INTRAMUSCULAR; INTRAVENOUS ONCE
Status: COMPLETED | OUTPATIENT
Start: 2025-04-19 | End: 2025-04-19

## 2025-04-19 RX ORDER — ALBUTEROL SULFATE 90 UG/1
2 INHALANT RESPIRATORY (INHALATION) 4 TIMES DAILY PRN
Qty: 18 G | Refills: 0 | Status: SHIPPED | OUTPATIENT
Start: 2025-04-19

## 2025-04-19 RX ORDER — BENZONATATE 100 MG/1
100 CAPSULE ORAL ONCE
Status: COMPLETED | OUTPATIENT
Start: 2025-04-19 | End: 2025-04-19

## 2025-04-19 RX ORDER — ALBUTEROL SULFATE 0.83 MG/ML
2.5 SOLUTION RESPIRATORY (INHALATION) ONCE
Status: COMPLETED | OUTPATIENT
Start: 2025-04-19 | End: 2025-04-19

## 2025-04-19 RX ORDER — BENZONATATE 100 MG/1
100 CAPSULE ORAL 3 TIMES DAILY PRN
Qty: 30 CAPSULE | Refills: 0 | Status: SHIPPED | OUTPATIENT
Start: 2025-04-19 | End: 2025-04-29

## 2025-04-19 RX ORDER — MAGNESIUM SULFATE IN WATER 40 MG/ML
2000 INJECTION, SOLUTION INTRAVENOUS ONCE
Status: COMPLETED | OUTPATIENT
Start: 2025-04-19 | End: 2025-04-19

## 2025-04-19 RX ORDER — ONDANSETRON 4 MG/1
4 TABLET, ORALLY DISINTEGRATING ORAL 3 TIMES DAILY PRN
Qty: 21 TABLET | Refills: 0 | Status: SHIPPED | OUTPATIENT
Start: 2025-04-19

## 2025-04-19 RX ORDER — IPRATROPIUM BROMIDE AND ALBUTEROL SULFATE 2.5; .5 MG/3ML; MG/3ML
1 SOLUTION RESPIRATORY (INHALATION) ONCE
Status: COMPLETED | OUTPATIENT
Start: 2025-04-19 | End: 2025-04-19

## 2025-04-19 RX ORDER — KETOROLAC TROMETHAMINE 30 MG/ML
30 INJECTION, SOLUTION INTRAMUSCULAR; INTRAVENOUS ONCE
Status: COMPLETED | OUTPATIENT
Start: 2025-04-19 | End: 2025-04-19

## 2025-04-19 RX ADMIN — ALBUTEROL SULFATE 2.5 MG: 2.5 SOLUTION RESPIRATORY (INHALATION) at 14:53

## 2025-04-19 RX ADMIN — BENZONATATE 100 MG: 100 CAPSULE ORAL at 14:49

## 2025-04-19 RX ADMIN — AZITHROMYCIN DIHYDRATE 500 MG: 250 TABLET, FILM COATED ORAL at 15:19

## 2025-04-19 RX ADMIN — MAGNESIUM SULFATE HEPTAHYDRATE 2000 MG: 40 INJECTION, SOLUTION INTRAVENOUS at 14:52

## 2025-04-19 RX ADMIN — IPRATROPIUM BROMIDE AND ALBUTEROL SULFATE 1 DOSE: .5; 2.5 SOLUTION RESPIRATORY (INHALATION) at 13:42

## 2025-04-19 RX ADMIN — METHYLPREDNISOLONE SODIUM SUCCINATE 125 MG: 125 INJECTION INTRAMUSCULAR; INTRAVENOUS at 13:38

## 2025-04-19 RX ADMIN — KETOROLAC TROMETHAMINE 30 MG: 30 INJECTION, SOLUTION INTRAMUSCULAR at 13:37

## 2025-04-19 ASSESSMENT — PAIN DESCRIPTION - ORIENTATION
ORIENTATION: MID
ORIENTATION: MID

## 2025-04-19 ASSESSMENT — PAIN - FUNCTIONAL ASSESSMENT
PAIN_FUNCTIONAL_ASSESSMENT: 0-10
PAIN_FUNCTIONAL_ASSESSMENT: NONE - DENIES PAIN

## 2025-04-19 ASSESSMENT — PAIN SCALES - GENERAL
PAINLEVEL_OUTOF10: 5
PAINLEVEL_OUTOF10: 5

## 2025-04-19 ASSESSMENT — PAIN DESCRIPTION - LOCATION
LOCATION: CHEST
LOCATION: CHEST

## 2025-04-19 NOTE — ED PROVIDER NOTES
that her maternal grandfather is . She indicated that her paternal grandmother is . She indicated that her paternal grandfather is . She indicated that her maternal aunt is . She indicated that two of her three maternal uncles are alive. She indicated that all of her three paternal aunts are alive. She indicated that both of her paternal uncles are alive. She indicated that her half-brother is alive.     family history includes COPD in her mother; Cancer (age of onset: 75) in her paternal grandfather; Depression in her mother; Diabetes in her father, maternal grandfather, maternal grandmother, maternal uncle, and mother; Heart Disease in her mother; Hypertension in her mother; Migraines in her mother; No Known Problems in her half-brother; Other in her father and mother; Parkinsonism in her mother; Stroke (age of onset: 58) in her mother; Uterine Cancer in her paternal aunt.    SOCIAL HISTORY:     reports that she has been smoking cigarettes. She started smoking about 27 years ago. She has a 27 pack-year smoking history. She has never used smokeless tobacco. She reports current alcohol use. She reports that she does not use drugs.    IMMUNIZATION HISTORY:      Immunization History   Administered Date(s) Administered    Pneumococcal, PCV20, PREVNAR 20, (age 6w+), IM, 0.5mL 2022    TDaP, ADACEL (age 10y-64y), BOOSTRIX (age 10y+), IM, 0.5mL 2020, 11/15/2022       PHYSICAL EXAM:     Initial Vital Signs:  weight is 65.8 kg (145 lb). Her tympanic temperature is 98.6 °F (37 °C). Her blood pressure is 98/63 and her pulse is 96. Her respiration is 24 and oxygen saturation is 90%.   Vital Signs: Febrile, hypoxic, tachycardic, tachypneic  Constitutional: Alert and oriented x 3, speaking in short sentences  HEENT: Atramautic and normocephalic. Eyes: Pupils equal, round, reactive to light. Nares patent.  Neck: Supple, non-tender.   Respiratory: Diminished breath sounds with wheezing  bilaterally, tachypnea, accessory muscle use  Cardiovascular: Normal S1 and S2.  2+ pulses in bilateral radials  GI: No rebound, guarding, rigidity or ecchymosis. Abdomen soft, non-tender, non-distended.  Musculoskeletal: No gross deformities, tenderness appreciated.  No calf tenderness or edema  Integument: Inspection within normal limits. Well hydrated. No obvious wounds. No rash.  Neurological: No focal deficits appreciated.   .        MEDICAL DECISION MAKIN-year-old female with known history of COPD who is on 2 L nasal cannula as needed presents emergency department after a week of not feeling well, worsening cough, shortness of breath and wheezing.  She is febrile on arrival, tachycardic and tachypneic.  Hypoxic on room air to the low 80s, placed on 2 L nasal cannula with improvement to the low 90s.  Has wheezing and diminished breath sounds and DuoNeb ordered.  No recent steroid use, is currently on antibiotics for a dental infection.  Plan for steroids, will consider magnesium if not improved after DuoNeb.  Will provide patient with antipyretic, obtain cardiac workup, chest x-ray and viral swabs.  Patient is an active smoker, still smokes a pack a day.  Does states she is trying to quit and only had 1 cigarette today.  Likely infectious etiology of symptoms given her temperature.  DIFFERENTIAL DIAGNOSIS:   Pneumonia, sinusitis, foreign body, URI, viral illness, asthma/ COPD, allergic rhinitis, GERD, ACE- inhibitor use      PLAN:     Orders Placed This Encounter   Procedures    COVID-19, Rapid    Rapid influenza A/B antigens    XR CHEST PORTABLE    Basic Metabolic Panel    CBC with Auto Differential    Brain Natriuretic Peptide    EKG 12 Lead       MEDICATIONS ORDERED:     Orders Placed This Encounter   Medications    ipratropium 0.5 mg-albuterol 2.5 mg (DUONEB) nebulizer solution 1 Dose     Initiate RT Bronchodilator Protocol:   No    methylPREDNISolone sodium succ (SOLU-MEDROL) injection 125 mg

## 2025-04-19 NOTE — DISCHARGE INSTRUCTIONS
Take your medication as indicated and prescribed.  If you were given a prescription for prednisone or any other steroid then, take Pepcid (famotidine - over the counter) every day while you are taking the steroids.  If you are a diabetic, you should check your blood sugar more frequently while taking prednisone.  Use your inhaler or nebulizer as prescribed, or at minimum every 4 hours while you are having an asthma attack.    If you are given an antibiotic, then make sure you get the prescription filled and take the antibiotics until finished.  Drink plenty of water while taking the antibiotics.  Avoid drinking alcohol or drinks that have caffeine in it while taking antibiotics.       Being around people that smoke, sandra houses, weather change can cause an asthma exacerbation.  If you smoke, then you should discuss with your physician about ways to quit smoking.    PLEASE RETURN TO THE EMERGENCY DEPARTMENT IMMEDIATELY for worsening symptoms of shortness of breath, wheezing, change in the amount of sputum that you cough up or a change in the color of your sputum, using your inhaler more frequently or if your inhaler only lasts up to 2 hours, or if you develop any concerning symptoms such as: high fever not relieved by acetaminophen (Tylenol) and/or ibuprofen (Motrin / Advil), chills, shortness of breath, chest pain, feeling of your heart fluttering or racing, persistent nausea and/or vomiting, vomiting up blood, blood in your stool, loss of consciousness, numbness, weakness or tingling in the arms or legs or change in color of the extremities, changes in mental status, persistent headache, blurry vision, loss of bladder / bowel control, unable to follow up with your physician, or other any other care or concern.

## 2025-04-21 ENCOUNTER — CARE COORDINATION (OUTPATIENT)
Dept: CARE COORDINATION | Age: 45
End: 2025-04-21

## 2025-04-21 LAB
EKG ATRIAL RATE: 101 BPM
EKG P AXIS: 58 DEGREES
EKG P-R INTERVAL: 136 MS
EKG Q-T INTERVAL: 346 MS
EKG QRS DURATION: 66 MS
EKG QTC CALCULATION (BAZETT): 448 MS
EKG R AXIS: 61 DEGREES
EKG T AXIS: 64 DEGREES
EKG VENTRICULAR RATE: 101 BPM

## 2025-04-21 NOTE — CARE COORDINATION
Ambulatory Care Coordination Note     4/21/2025 11:41 AM     Patient outreach attempt by this ACM today for ED F/U call and to offer care management services. ACM was unable to reach the patient by telephone today;   voicemail full and unable to leave a message.      ACM: Arianna Bocanegra, RN     Care Summary Note:     Roxana was seen at [facility] ED 4/19/2025.     4/21/2025- 11:42 am  Left HIPAA compliant voice message requesting return call @ [phone].     PCP/Specialist follow up:   Future Appointments         Provider Specialty Dept Phone    8/4/2025 9:00 AM Sekou Marquez, OSEI - CNP Family Medicine [phone]            Follow Up:   Plan for next AC outreach in approximately 1-2 days  to complete:  - outreach attempt to offer care management services  ED F/U call .

## 2025-04-22 ENCOUNTER — CARE COORDINATION (OUTPATIENT)
Dept: CARE COORDINATION | Age: 45
End: 2025-04-22

## 2025-04-22 ENCOUNTER — ENROLLMENT (OUTPATIENT)
Dept: CARE COORDINATION | Age: 45
End: 2025-04-22

## 2025-04-22 NOTE — CARE COORDINATION
Ambulatory Care Coordination Note     4/22/2025 2:02 PM     Patient Current Location:  Home: [address] [place] [id number]     ACM contacted the patient by telephone.      ACM: Arianna Bocanegra RN     Challenges to be reviewed by the provider   Additional needs identified to be addressed with provider No  none               Method of communication with provider: none.    Utilization: Initial Call - Discharge Date: 4/19/25   Discharge Facility: [facility]  Reason for ED Visit: SOB, fever   Visit Diagnosis: COPD, Pneumonia    Number of ED visits in the last 6 months: 3      Do you have any ongoing symptoms? Yes, my symptoms have improved.   Current symptoms: SOB.    Did you call your PCP prior to going to the ED? No, did not call the PCP office.     Review of Discharge Instructions:   [x] AVS discharge instructions  [] Right Care, Right Place, Right Time document  [x] Medication changes  [x] Follow up appointments  [] Referral follow up          Care Summary Note:     Roxana has:   COPD, recent DX- pneumonia- uses Oxygen at 2 L at night and prn, nebulizer, medications     Plan of Care : Enrolled in [facility]    Continue assessments, education, and support    COPD assessment    Medication review    Mailed Flyers- Right Care, [facility], My Chart Scheduling and COPD and Pneumonia Zone Tools     Spoke with Roxana. She is using her oxygen at 2 L at night and prn. Sp02 on room air was 92% last pm. Parameters reviewed and she voiced understanding.   She is using her nebulizer. She started medications from ED and these were reviewed.   She reports she is improving but slowly.   She was in agreement to F/U apt with PCP scheduled and in agreement.      Assessments Completed:   General Assessment    Do you have any symptoms that are causing concern?: Yes  Progression since Onset: Gradually Improving  Reported Symptoms: Cough, Congestion, Shortness of Breath          Medications Reviewed:   Not

## 2025-04-23 ENCOUNTER — OFFICE VISIT (OUTPATIENT)
Dept: FAMILY MEDICINE CLINIC | Age: 45
End: 2025-04-23
Payer: COMMERCIAL

## 2025-04-23 VITALS
WEIGHT: 144 LBS | HEIGHT: 62 IN | OXYGEN SATURATION: 96 % | SYSTOLIC BLOOD PRESSURE: 110 MMHG | DIASTOLIC BLOOD PRESSURE: 66 MMHG | RESPIRATION RATE: 16 BRPM | BODY MASS INDEX: 26.5 KG/M2 | HEART RATE: 58 BPM

## 2025-04-23 DIAGNOSIS — J96.11 CHRONIC RESPIRATORY FAILURE WITH HYPOXIA (HCC): Primary | ICD-10-CM

## 2025-04-23 DIAGNOSIS — J18.9 PNEUMONIA OF RIGHT UPPER LOBE DUE TO INFECTIOUS ORGANISM: ICD-10-CM

## 2025-04-23 DIAGNOSIS — J41.1 MUCOPURULENT CHRONIC BRONCHITIS (HCC): ICD-10-CM

## 2025-04-23 PROCEDURE — 99214 OFFICE O/P EST MOD 30 MIN: CPT | Performed by: NURSE PRACTITIONER

## 2025-04-23 RX ORDER — CLINDAMYCIN HYDROCHLORIDE 150 MG/1
150 CAPSULE ORAL 3 TIMES DAILY
COMMUNITY
Start: 2025-04-11

## 2025-04-23 RX ORDER — BUDESONIDE 0.5 MG/2ML
500 INHALANT ORAL 2 TIMES DAILY
Qty: 60 EACH | Refills: 2 | Status: SHIPPED | OUTPATIENT
Start: 2025-04-23

## 2025-04-23 SDOH — ECONOMIC STABILITY: FOOD INSECURITY: WITHIN THE PAST 12 MONTHS, THE FOOD YOU BOUGHT JUST DIDN'T LAST AND YOU DIDN'T HAVE MONEY TO GET MORE.: NEVER TRUE

## 2025-04-23 SDOH — ECONOMIC STABILITY: FOOD INSECURITY: WITHIN THE PAST 12 MONTHS, YOU WORRIED THAT YOUR FOOD WOULD RUN OUT BEFORE YOU GOT MONEY TO BUY MORE.: NEVER TRUE

## 2025-04-23 NOTE — PROGRESS NOTES
PO) Take by mouth daily      budesonide (PULMICORT) 0.5 MG/2ML nebulizer suspension Take 2 mLs by nebulization 2 times daily 60 each 2    predniSONE (DELTASONE) 50 MG tablet Take 1 tablet by mouth daily for 5 days 5 tablet 0    [] azithromycin (ZITHROMAX) 250 MG tablet Take 1 tablet by mouth daily for 4 days 4 tablet 0    benzonatate (TESSALON) 100 MG capsule Take 1 capsule by mouth 3 times daily as needed for Cough 30 capsule 0    ondansetron (ZOFRAN-ODT) 4 MG disintegrating tablet Take 1 tablet by mouth 3 times daily as needed for Nausea or Vomiting 21 tablet 0    albuterol sulfate HFA (VENTOLIN HFA) 108 (90 Base) MCG/ACT inhaler Inhale 2 puffs into the lungs 4 times daily as needed for Wheezing 18 g 0    ALPRAZolam (XANAX) 0.5 MG tablet Take 1 tablet by mouth nightly as needed.      simvastatin (ZOCOR) 20 MG tablet TAKE ONE TABLET BY MOUTH ONCE NIGHTLY 90 tablet 1    midodrine (PROAMATINE) 5 MG tablet TAKE 1 TABLET BY MOUTH 3 TIMES A DAY 90 tablet 2    metoprolol tartrate (LOPRESSOR) 25 MG tablet Take 2 tablets by mouth 2 times daily 120 tablet 2    busPIRone (BUSPAR) 10 MG tablet TAKE 1 TABLET BY MOUTH 2 TIMES A  tablet 0    omeprazole (PRILOSEC) 40 MG delayed release capsule TAKE 1 CAPSULE BY MOUTH 2 TIMES A  capsule 1    citalopram (CELEXA) 40 MG tablet TAKE 1 TABLET BY MOUTH EVERY MORNING 90 tablet 0    TRELEGY ELLIPTA 100-62.5-25 MCG/ACT AEPB inhaler INHALE 1 PUFF BY MOUTH DAILY 1 each 2    montelukast (SINGULAIR) 10 MG tablet TAKE ONE TABLET BY MOUTH ONCE NIGHTLY 90 tablet 1    Black Cohosh 40 MG CAPS Take 1 tablet by mouth 2 times daily      famotidine (PEPCID) 40 MG tablet TAKE 1 TABLET BY MOUTH AT BEDTIME 90 tablet 1    ipratropium 0.5 mg-albuterol 2.5 mg (DUONEB) 0.5-2.5 (3) MG/3ML SOLN nebulizer solution Inhale 3 mLs into the lungs every 6 hours as needed for Shortness of Breath or Wheezing 360 mL 2    vitamin D (CHOLECALCIFEROL) 1000 UNIT TABS tablet Take 2 tablets by mouth daily

## 2025-04-28 ENCOUNTER — CARE COORDINATION (OUTPATIENT)
Dept: CARE COORDINATION | Age: 45
End: 2025-04-28

## 2025-04-28 NOTE — CARE COORDINATION
Ambulatory Care Coordination Note     4/28/2025 11:02 AM     Patient outreach attempt by this AC today to perform care management follow up . [facility] was unable to reach the patient by telephone today;   left voice message requesting a return phone call to this ACM.     ACM: Arianna Bocanegra, RN     Care Summary Note:     Roxana has:   COPD, recent DX- pneumonia- uses Oxygen at 2 L at night and prn, nebulizer, medications      Plan of Care : Continue assessments, education, and support                          COPD assessment                          Medication review                          Mailed Flyers- [facility], [facility], My Chart Scheduling and COPD and Pneumonia Zone Tools      Per chart review- she saw PCP. Given Pulmicort nebulizer solution     4/28/2025- 11 am  Left HIPAA compliant voice message requesting return call @ [phone].       PCP/Specialist follow up:   Future Appointments         Provider Specialty Dept Phone    8/4/2025 9:00 AM Sekou Marquez APRN - CNP Family Medicine [phone]            Follow Up:   Plan for next AC outreach in approximately 1 week to complete:  - education .

## 2025-05-06 ENCOUNTER — CARE COORDINATION (OUTPATIENT)
Dept: CARE COORDINATION | Age: 45
End: 2025-05-06

## 2025-05-13 DIAGNOSIS — R55 NEUROCARDIOGENIC SYNCOPE: ICD-10-CM

## 2025-05-13 RX ORDER — MIDODRINE HYDROCHLORIDE 5 MG/1
TABLET ORAL
Qty: 90 TABLET | Refills: 2 | OUTPATIENT
Start: 2025-05-13

## 2025-05-13 NOTE — TELEPHONE ENCOUNTER
3 month supply sent 4/3/25 patient made aware to contact [facility] for a refill

## 2025-05-15 ENCOUNTER — CARE COORDINATION (OUTPATIENT)
Dept: CARE COORDINATION | Age: 45
End: 2025-05-15

## 2025-05-15 NOTE — CARE COORDINATION
Ambulatory Care Coordination Note     5/15/2025 9:15 AM     Patient Current Location:  [place]     ACM contacted the patient by telephone. Verified name and  with patient as identifiers.         ACM: Arianna Bocanegra RN     Challenges to be reviewed by the provider   Additional needs identified to be addressed with provider No  none               Method of communication with provider: none.    Utilization: Patient has not had any utilization since our last call.     Care Summary Note:     Roxana has:   COPD, recent DX- pneumonia- uses Oxygen at 2 L at night and prn, nebulizer, medications      Plan of Care : Continue assessments, education, and support                          COPD assessment, [facility]                           Medication review                          2025- Mailed Flyers- Right Care, [facility], My Chart Scheduling and COPD and Pneumonia Zone Tools     5/15/2025- 9:15 am spoke with Roxana. She denied any concerns with breathing.  She was not at home to verify company that she gets her oxygen through. She only uses at night and prn.      Assessments Completed:   General Assessment    Do you have any symptoms that are causing concern?: No          Medications Reviewed:   Not completed during this call: she was not at home     Advance Care Planning:   Not reviewed during this call     Care Planning:    Goals Addressed                   This Visit's Progress     Conditions and Symptoms   On track     I will schedule office visits, as directed by my provider.  I will keep my appointment or reschedule if I have to cancel.  I will notify my provider of any barriers to my plan of care.  I will follow my Zone Management tool to seek urgent or emergent care.  I will notify my provider of any symptoms that indicate a worsening of my condition.    Barriers: lack of support and lack of education  Plan for overcoming my barriers: Care Coordination Intervention   Confidence: 10/10  Anticipated

## 2025-05-20 ENCOUNTER — FOLLOWUP TELEPHONE ENCOUNTER (OUTPATIENT)
Dept: INPATIENT UNIT | Age: 45
End: 2025-05-20

## 2025-05-20 NOTE — PROGRESS NOTES
PCU receiving home oxygen renewals from [employer] instead of PCP. Chart accessed to reveal PCP to deliver notice.

## 2025-05-28 ENCOUNTER — CARE COORDINATION (OUTPATIENT)
Dept: CARE COORDINATION | Age: 45
End: 2025-05-28

## 2025-05-28 NOTE — CARE COORDINATION
Ambulatory Care Coordination Note     5/28/2025 10:01 AM     Patient outreach attempt by this ACM today to perform care management follow up . ACM was unable to reach the patient by telephone today;   left voice message requesting a return phone call to this ACM.     ACM: Arianna Bocanegra, RN     Care Summary Note:     Roxana has:   COPD, recent DX- pneumonia- uses Oxygen at 2 L at night and prn, nebulizer, medications      Plan of Care : Continue assessments, education, and support                          COPD assessment, SDOH                           Medication review                          4/24/2025- Mailed Flyers- Right Care, [facility], My Chart Scheduling and COPD and Pneumonia Zone Tools     5/28/2025- 10 am  Left HIPAA compliant voice message requesting return call @ [phone].       PCP/Specialist follow up:   Future Appointments         Provider Specialty Dept Phone    8/4/2025 9:00 AM Sekou Marquez APRN - CNP Family Medicine [phone]            Follow Up:   Plan for next ACM outreach in approximately 2 weeks to complete:  - education .

## 2025-06-10 ENCOUNTER — CARE COORDINATION (OUTPATIENT)
Dept: CARE COORDINATION | Age: 45
End: 2025-06-10

## 2025-06-10 NOTE — CARE COORDINATION
Ambulatory Care Coordination Note     6/10/2025 10:20 AM     Patient outreach attempt by this ACM today to perform care management follow up . ACM was unable to reach the patient by telephone today;   left voice message requesting a return phone call to this ACM.     ACM: Arianna Bocanegra, RN     Care Summary Note:     Roxana has:   COPD, recent DX- pneumonia- uses Oxygen at 2 L at night and prn, nebulizer, medications      Plan of Care : Continue assessments, education, and support                          COPD assessment, SDOH                           Medication review                          4/24/2025- Mailed Flyers- [facility] Care, [facility], My Chart Scheduling and COPD and Pneumonia Zone Tools      5/28/2025- 10 am  Left HIPAA compliant voice message requesting return call @ [phone].   6/10/2025- 10:20 am  Left HIPAA compliant voice message requesting return call @ [phone].       PCP/Specialist follow up:   Future Appointments         Provider Specialty Dept Phone    8/4/2025 9:00 AM Sekou Marquez APRN - CNP Family Medicine [phone]            Follow Up:   Plan for next ACM outreach in approximately 1-2 days  to complete:  - education .

## 2025-06-11 ENCOUNTER — CARE COORDINATION (OUTPATIENT)
Dept: CARE COORDINATION | Age: 45
End: 2025-06-11

## 2025-06-11 NOTE — CARE COORDINATION
Ambulatory Care Coordination Note     6/11/2025 9:01 AM     Patient outreach attempt by this ACM today to perform care management follow up . ACM was unable to reach the patient by telephone today;   left voice message requesting a return phone call to this ACM.     ACM: Arianna Bocanegra, RN     Care Summary Note:     Roxana has:   COPD, recent DX- pneumonia- uses Oxygen at 2 L at night and prn, nebulizer, medications      Plan of Care : Continue assessments, education, and support                          COPD assessment, SDOH                           Medication review                          4/24/2025- Mailed Flyers- Right Care, [facility], My Chart Scheduling and COPD and Pneumonia Zone Tools      5/28/2025- 10 am  Left HIPAA compliant voice message requesting return call @ [phone].   6/10/2025- 10:20 am  Left HIPAA compliant voice message requesting return call @ [phone].   6/11/2025- 9 am  Left HIPAA compliant voice message requesting return call @ [phone].          PCP/Specialist follow up:   Future Appointments         Provider Specialty Dept Phone    8/4/2025 9:00 AM Sekou Marquez, OSEI - CNP Family Medicine [phone]            Follow Up:   Plan for next AC outreach in approximately 1 week to complete:  - education .

## 2025-06-15 ENCOUNTER — OFFICE VISIT (OUTPATIENT)
Dept: PRIMARY CARE CLINIC | Age: 45
End: 2025-06-15

## 2025-06-15 VITALS
TEMPERATURE: 98 F | WEIGHT: 144 LBS | RESPIRATION RATE: 16 BRPM | SYSTOLIC BLOOD PRESSURE: 118 MMHG | BODY MASS INDEX: 26.5 KG/M2 | DIASTOLIC BLOOD PRESSURE: 70 MMHG | HEART RATE: 80 BPM | OXYGEN SATURATION: 96 % | HEIGHT: 62 IN

## 2025-06-15 DIAGNOSIS — G43.809 OTHER MIGRAINE WITHOUT STATUS MIGRAINOSUS, NOT INTRACTABLE: Primary | ICD-10-CM

## 2025-06-15 RX ORDER — PROMETHAZINE HYDROCHLORIDE 25 MG/ML
25 INJECTION, SOLUTION INTRAMUSCULAR; INTRAVENOUS ONCE
Status: COMPLETED | OUTPATIENT
Start: 2025-06-15 | End: 2025-06-15

## 2025-06-15 RX ORDER — KETOROLAC TROMETHAMINE 30 MG/ML
30 INJECTION, SOLUTION INTRAMUSCULAR; INTRAVENOUS ONCE
Status: COMPLETED | OUTPATIENT
Start: 2025-06-15 | End: 2025-06-15

## 2025-06-15 RX ADMIN — KETOROLAC TROMETHAMINE 30 MG: 30 INJECTION, SOLUTION INTRAMUSCULAR; INTRAVENOUS at 14:22

## 2025-06-15 RX ADMIN — PROMETHAZINE HYDROCHLORIDE 25 MG: 25 INJECTION, SOLUTION INTRAMUSCULAR; INTRAVENOUS at 14:23

## 2025-06-17 ENCOUNTER — CARE COORDINATION (OUTPATIENT)
Dept: CARE COORDINATION | Age: 45
End: 2025-06-17

## 2025-06-17 SDOH — SOCIAL STABILITY: SOCIAL NETWORK: HOW OFTEN DO YOU ATTEND MEETINGS OF THE CLUBS OR ORGANIZATIONS YOU BELONG TO?: NEVER

## 2025-06-17 SDOH — HEALTH STABILITY: MENTAL HEALTH: HOW MANY DRINKS CONTAINING ALCOHOL DO YOU HAVE ON A TYPICAL DAY WHEN YOU ARE DRINKING?: 1 OR 2

## 2025-06-17 SDOH — ECONOMIC STABILITY: FOOD INSECURITY: HOW HARD IS IT FOR YOU TO PAY FOR THE VERY BASICS LIKE FOOD, HOUSING, MEDICAL CARE, AND HEATING?: NOT VERY HARD

## 2025-06-17 SDOH — HEALTH STABILITY: PHYSICAL HEALTH: ON AVERAGE, HOW MANY MINUTES DO YOU ENGAGE IN EXERCISE AT THIS LEVEL?: 30 MIN

## 2025-06-17 SDOH — SOCIAL STABILITY: SOCIAL NETWORK
IN A TYPICAL WEEK, HOW MANY TIMES DO YOU TALK ON THE PHONE WITH FAMILY, FRIENDS, OR NEIGHBORS?: MORE THAN THREE TIMES A WEEK

## 2025-06-17 SDOH — ECONOMIC STABILITY: FOOD INSECURITY: WITHIN THE PAST 12 MONTHS, YOU WORRIED THAT YOUR FOOD WOULD RUN OUT BEFORE YOU GOT THE MONEY TO BUY MORE.: NEVER TRUE

## 2025-06-17 SDOH — HEALTH STABILITY: MENTAL HEALTH: HOW OFTEN DO YOU HAVE A DRINK CONTAINING ALCOHOL?: 2-4 TIMES A MONTH

## 2025-06-17 SDOH — SOCIAL STABILITY: SOCIAL NETWORK: HOW OFTEN DO YOU ATTEND CHURCH OR RELIGIOUS SERVICES?: MORE THAN 4 TIMES PER YEAR

## 2025-06-17 SDOH — ECONOMIC STABILITY: FOOD INSECURITY: WITHIN THE PAST 12 MONTHS, THE FOOD YOU BOUGHT JUST DIDN'T LAST AND YOU DIDN'T HAVE MONEY TO GET MORE.: NEVER TRUE

## 2025-06-17 SDOH — ECONOMIC STABILITY: HOUSING INSECURITY: IN THE LAST 12 MONTHS, WAS THERE A TIME WHEN YOU WERE NOT ABLE TO PAY THE MORTGAGE OR RENT ON TIME?: NO

## 2025-06-17 SDOH — SOCIAL STABILITY: SOCIAL NETWORK
DO YOU BELONG TO ANY CLUBS OR ORGANIZATIONS SUCH AS CHURCH GROUPS, UNIONS, FRATERNAL OR ATHLETIC GROUPS, OR SCHOOL GROUPS?: NO

## 2025-06-17 SDOH — SOCIAL STABILITY: SOCIAL INSECURITY: ARE YOU MARRIED, WIDOWED, DIVORCED, SEPARATED, NEVER MARRIED, OR LIVING WITH A PARTNER?: MARRIED

## 2025-06-17 SDOH — SOCIAL STABILITY: SOCIAL NETWORK: HOW OFTEN DO YOU GET TOGETHER WITH FRIENDS OR RELATIVES?: MORE THAN THREE TIMES A WEEK

## 2025-06-17 SDOH — HEALTH STABILITY: MENTAL HEALTH
DO YOU FEEL STRESS - TENSE, RESTLESS, NERVOUS, OR ANXIOUS, OR UNABLE TO SLEEP AT NIGHT BECAUSE YOUR MIND IS TROUBLED ALL THE TIME - THESE DAYS?: TO SOME EXTENT

## 2025-06-17 SDOH — HEALTH STABILITY: PHYSICAL HEALTH: ON AVERAGE, HOW MANY DAYS PER WEEK DO YOU ENGAGE IN MODERATE TO STRENUOUS EXERCISE (LIKE A BRISK WALK)?: 1 DAY

## 2025-06-17 SDOH — ECONOMIC STABILITY: TRANSPORTATION INSECURITY: IN THE PAST 12 MONTHS, HAS LACK OF TRANSPORTATION KEPT YOU FROM MEDICAL APPOINTMENTS OR FROM GETTING MEDICATIONS?: NO

## 2025-06-17 ASSESSMENT — ACTIVITIES OF DAILY LIVING (ADL): LACK_OF_TRANSPORTATION: NO

## 2025-06-17 NOTE — CARE COORDINATION
during this call    Advance Care Planning:   Not reviewed during this call     Care Planning:    Goals Addressed                   This Visit's Progress     Conditions and Symptoms   On track     I will schedule office visits, as directed by my provider.  I will keep my appointment or reschedule if I have to cancel.  I will notify my provider of any barriers to my plan of care.  I will follow my Zone Management tool to seek urgent or emergent care.  I will notify my provider of any symptoms that indicate a worsening of my condition.    Barriers: lack of support and lack of education  Plan for overcoming my barriers: Care Coordination Intervention   Confidence: 10/10  Anticipated Goal Completion Date: 7/22/2025                 PCP/Specialist follow up:   Future Appointments         Provider Specialty Dept Phone    6/20/2025 8:40 AM Sekou Marquez APRN - CNP Family Medicine [phone]    8/4/2025 9:00 AM Sekou Marquez APRN - CNP Family Medicine [phone]            Follow Up:   Plan for next ACM outreach in approximately 2 weeks to complete:  - education .   Patient  is agreeable to this plan.

## 2025-06-25 DIAGNOSIS — F41.8 ANXIETY WITH DEPRESSION: ICD-10-CM

## 2025-06-25 RX ORDER — CITALOPRAM HYDROBROMIDE 40 MG/1
40 TABLET ORAL EVERY MORNING
Qty: 90 TABLET | Refills: 0 | Status: SHIPPED | OUTPATIENT
Start: 2025-06-25 | End: 2025-06-26 | Stop reason: SDUPTHER

## 2025-06-25 NOTE — TELEPHONE ENCOUNTER
Roxana called requesting a refill of the below medication which has been pended for you:     Requested Prescriptions     Pending Prescriptions Disp Refills    citalopram (CELEXA) 40 MG tablet 90 tablet 0     Sig: Take 1 tablet by mouth every morning       Last Appointment Date: 4/23/2025  Next Appointment Date: 7/31/2025    Allergies   Allergen Reactions    Amoxicillin Nausea Only     GI UPSET    Other      seaosanal allergies      Pcn [Penicillins] Nausea Only     GI UPSET

## 2025-06-26 DIAGNOSIS — F41.8 ANXIETY WITH DEPRESSION: ICD-10-CM

## 2025-06-26 RX ORDER — CITALOPRAM HYDROBROMIDE 40 MG/1
40 TABLET ORAL EVERY MORNING
Qty: 90 TABLET | Refills: 0 | Status: SHIPPED | OUTPATIENT
Start: 2025-06-26

## 2025-06-26 NOTE — TELEPHONE ENCOUNTER
Calista didn't receive script sent yesterday, please re-send.    Roxana called requesting a refill of the below medication which has been pended for you:     Requested Prescriptions     Pending Prescriptions Disp Refills    citalopram (CELEXA) 40 MG tablet 90 tablet 0     Sig: Take 1 tablet by mouth every morning       Last Appointment Date: 4/23/2025  Next Appointment Date: 7/31/2025    Allergies   Allergen Reactions    Amoxicillin Nausea Only     GI UPSET    Other      seaosanal allergies      Pcn [Penicillins] Nausea Only     GI UPSET

## 2025-07-07 ENCOUNTER — CARE COORDINATION (OUTPATIENT)
Dept: CARE COORDINATION | Age: 45
End: 2025-07-07

## 2025-07-09 ENCOUNTER — OFFICE VISIT (OUTPATIENT)
Dept: PRIMARY CARE CLINIC | Age: 45
End: 2025-07-09

## 2025-07-09 VITALS
DIASTOLIC BLOOD PRESSURE: 80 MMHG | HEART RATE: 68 BPM | OXYGEN SATURATION: 97 % | HEIGHT: 61 IN | RESPIRATION RATE: 18 BRPM | WEIGHT: 145 LBS | SYSTOLIC BLOOD PRESSURE: 110 MMHG | TEMPERATURE: 97.8 F | BODY MASS INDEX: 27.38 KG/M2

## 2025-07-09 DIAGNOSIS — S00.06XA TICK BITE OF SCALP, INITIAL ENCOUNTER: Primary | ICD-10-CM
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
## 2025-07-09 RX ORDER — DOXYCYCLINE HYCLATE 100 MG
100 TABLET ORAL 2 TIMES DAILY
Qty: 14 TABLET | Refills: 0 | Status: SHIPPED | OUTPATIENT
Start: 2025-07-09 | End: 2025-07-16

## 2025-07-09 ASSESSMENT — ENCOUNTER SYMPTOMS
COUGH: 0
PHOTOPHOBIA: 0
NAUSEA: 0
ABDOMINAL PAIN: 0
SHORTNESS OF BREATH: 0
DIARRHEA: 0
VOMITING: 0
CONSTIPATION: 0

## 2025-07-09 NOTE — PATIENT INSTRUCTIONS
Take full course of antibiotics  Tylenol/Ibuprofen as needed for headache  Push fluids  Follow up if symptoms worsen or do not improve

## 2025-07-09 NOTE — PROGRESS NOTES
[facility] In department of [facility]  [address] [facility] [address]  Phone: [phone]  Fax: [phone]      Roxana Mattson  1980  MRN: [id number]  Date of visit: 7/9/2025    Chief Complaint:     Roxana Mattson is here for c/o of Insect Bite (Pt presents with tick bite on scalp behind right ear. Pt's  had to remove tick on Monday/pt c/o headache and stiff neck x 2 days)      HPI:     Roxana Mattson is a 45 y.o. female who presents to the [facility] today for her medical conditions/complaints as noted below.    History of Present Illness  The patient is a 45-year-old female who presents today due to a tick bite in the scalp right behind her right ear. She had her  remove it on Monday, which was 2 days ago, but now she is having headache and stiff neck. Afebrile in office.    She experienced a tick bite on her scalp, specifically behind her right ear, which was removed by her  using tweezers. The area of the bite is sensitive and tender, with a small bump present. She reports no fever, chills, or nausea. She has not taken any over-the-counter pain relievers such as Tylenol or ibuprofen. She has never had this kind of reaction to a tick bite.    Following the removal of the tick, she developed a headache and neck stiffness. She thinks the neck stiffness may be attributed to sleeping wrong on it. The headache is localized to the same area as the tick bite and is rated as a 5 or 6 out of 10 in severity. It has been persistent since its onset. She reports no sensitivity to light or noise, dizziness, or blurry vision. She also mentions that the headache is not as severe as her previous migraines.    Past Medical History:   Diagnosis Date    Atrophic vaginitis 03/10/2016    Chronic fatigue     Constipation     Endometriosis     h/o endometriosis for which she had a hysterectomy    Genital herpes     Hyperlipidemia     Migraine

## 2025-07-10 ENCOUNTER — CARE COORDINATION (OUTPATIENT)
Dept: CARE COORDINATION | Age: 45
End: 2025-07-10

## 2025-07-10 NOTE — CARE COORDINATION
Ambulatory Care Coordination Note     7/10/2025 9:16 AM     Patient outreach attempt by this ACM today to perform care management follow up . ACM was unable to reach the patient by telephone today;   left voice message requesting a return phone call to this ACM.     ACM: Arianna Bocanegra, RN     Care Summary Note:     Roxana has:   COPD, recent DX- pneumonia- uses Oxygen at 2 L at night and prn, nebulizer, medications      Plan of Care : Continue assessments, education, and support                         SDOH completed                          Does she monitor any vitals at home?, Discuss RPM                           Medication reviewed 6/17/2025 4/24/2025- Mailed Flyers- Right [facility], [facility], My Chart Scheduling and COPD and Pneumonia Zone Tools                           F/U on oxygen therapy, initial assessment, COPD assessment, HC decision maker     Offered patient enrollment in the Remote Patient Monitoring (RPM) program for in-home monitoring: Deferred at this time because time; will discuss at next outreach.     7/7/2025- 2:30 pm  Left HIPAA compliant voice message requesting return call @ [phone].   7/10/2025- 9:17 am  Left HIPAA compliant voice message requesting return call @ [phone].       PCP/Specialist follow up:   Future Appointments         Provider Specialty Dept Phone    7/31/2025 11:20 AM Sekou Marquez APRN - CNP Family Medicine [phone]    8/4/2025 9:00 AM Sekou Marquez APRN - CNP Family Medicine [phone]            Follow Up:   Plan for next AC outreach in approximately 1 week to complete:  - disease specific assessments  - education .

## 2025-07-15 ENCOUNTER — CARE COORDINATION (OUTPATIENT)
Dept: CARE COORDINATION | Age: 45
End: 2025-07-15

## 2025-07-15 NOTE — CARE COORDINATION
Ambulatory Care Coordination Note     7/15/2025 2:42 PM     Patient Current Location:  Home: [address] [place] [address]  [place] [address]     ACM contacted the patient by telephone.         ACM: Arianna Bocanegra RN     Challenges to be reviewed by the provider   Additional needs identified to be addressed with provider No                 Method of communication with provider: none.    Utilization: Patient has not had any utilization since our last call.     Care Summary Note:       Plan of Care : Completion of ACM    7/15/2025 spoke with Roxana. She was seen at Urgent Care for a Tick Bite- no concerns at this time.   She continues to work.   She reports that she has been doing well- breathing good. She has not needed to use her oxygen. She has a Sp02 monitor. Reviewed usage and parameters and she voiced understanding.   She has apts scheduled and resources. Education completed.   We discussed that this writer will be retiring 8/1/2025. She stated she is doing well and declined referral to covering ACM. She is aware that PCP can refer in future if needs arise. She voiced understanding.      Assessments Completed:   COPD Assessment    Does the patient understand envrionmental exposure?: Yes  Is the patient able to verbalize Rescue vs. Long Acting medications?: Yes  Does the patient have a nebulizer?: Yes  Does the patient use a space with inhaled medications?: No     No patient-reported symptoms         Symptoms:               PCP/Specialist follow up:   Future Appointments         Provider Specialty Dept Phone    7/31/2025 11:20 AM Sekou Marquez APRN - CNP Family Medicine [phone]    8/4/2025 9:00 AM Sekou Marquez APRN - CNP Family Medicine [phone]            Follow Up:   No further Ambulatory Care Management follow-up scheduled at this time.

## 2025-07-21 DIAGNOSIS — R55 NEUROCARDIOGENIC SYNCOPE: ICD-10-CM

## 2025-07-23 RX ORDER — METOPROLOL TARTRATE 25 MG/1
50 TABLET, FILM COATED ORAL 2 TIMES DAILY
Qty: 120 TABLET | Refills: 0 | Status: SHIPPED | OUTPATIENT
Start: 2025-07-23

## 2025-07-28 ENCOUNTER — OFFICE VISIT (OUTPATIENT)
Dept: PRIMARY CARE CLINIC | Age: 45
End: 2025-07-28
Payer: COMMERCIAL

## 2025-07-28 VITALS
HEIGHT: 61 IN | DIASTOLIC BLOOD PRESSURE: 68 MMHG | SYSTOLIC BLOOD PRESSURE: 108 MMHG | BODY MASS INDEX: 27.38 KG/M2 | HEART RATE: 80 BPM | WEIGHT: 145 LBS | OXYGEN SATURATION: 99 % | TEMPERATURE: 97.8 F | RESPIRATION RATE: 16 BRPM

## 2025-07-28 DIAGNOSIS — T85.848D DENTAL IMPLANT PAIN, SUBSEQUENT ENCOUNTER: Primary | ICD-10-CM

## 2025-07-28 DIAGNOSIS — K04.7 DENTAL ABSCESS: ICD-10-CM

## 2025-07-28 PROCEDURE — 96372 THER/PROPH/DIAG INJ SC/IM: CPT | Performed by: NURSE PRACTITIONER

## 2025-07-28 PROCEDURE — 99214 OFFICE O/P EST MOD 30 MIN: CPT | Performed by: NURSE PRACTITIONER

## 2025-07-28 RX ORDER — HYDROCODONE BITARTRATE AND ACETAMINOPHEN 5; 325 MG/1; MG/1
1 TABLET ORAL EVERY 6 HOURS
COMMUNITY
Start: 2025-07-27 | End: 2025-08-01

## 2025-07-28 RX ORDER — NAPROXEN 250 MG/1
500 TABLET ORAL 2 TIMES DAILY
COMMUNITY
Start: 2025-07-27 | End: 2025-08-01

## 2025-07-28 RX ORDER — PROMETHAZINE HYDROCHLORIDE 25 MG/ML
12.5 INJECTION, SOLUTION INTRAMUSCULAR; INTRAVENOUS ONCE
Status: COMPLETED | OUTPATIENT
Start: 2025-07-28 | End: 2025-07-28

## 2025-07-28 RX ORDER — KETOROLAC TROMETHAMINE 30 MG/ML
30 INJECTION, SOLUTION INTRAMUSCULAR; INTRAVENOUS ONCE
Status: COMPLETED | OUTPATIENT
Start: 2025-07-28 | End: 2025-07-28

## 2025-07-28 RX ORDER — KETOROLAC TROMETHAMINE 10 MG/1
10 TABLET, FILM COATED ORAL EVERY 6 HOURS PRN
Qty: 12 TABLET | Refills: 0 | Status: SHIPPED | OUTPATIENT
Start: 2025-07-28 | End: 2025-07-31

## 2025-07-28 RX ADMIN — PROMETHAZINE HYDROCHLORIDE 12.5 MG: 25 INJECTION, SOLUTION INTRAMUSCULAR; INTRAVENOUS at 20:15

## 2025-07-28 RX ADMIN — KETOROLAC TROMETHAMINE 30 MG: 30 INJECTION, SOLUTION INTRAMUSCULAR; INTRAVENOUS at 20:13

## 2025-07-28 NOTE — PROGRESS NOTES
Subjective:      Patient ID: Roxana Mattson is a 45 y.o. female coming in for   Chief Complaint   Patient presents with    Dental Pain     Bottom, left         History of Present Illness  The patient presents to urgent care with complaints of left dental pain/infection. She was seen yesterday at [facility] in [place], [place], where she was prescribed Norco and naproxen. She has an upcoming dental appointment to get a tooth extracted.    She reports severe tooth pain that has been disturbing her sleep, describing the pain as \"next level.\" The pain has led to migraines, which have not been alleviated by Imitrex injections. She mentions a cracked tooth and states that Norco and naproxen, prescribed by an ER doctor on 07/26/2025, have not provided significant relief. She has been taking clindamycin for the past 3 days, but it has not been effective in managing her pain, has 4 days left of 7 day script     Additionally, she has been experiencing vomiting. Her respiratory function remains unaffected.    Sleep: Reports disturbed sleep due to severe tooth pain      Review of Systems     Objective:/68 (BP Site: Left Upper Arm, Patient Position: Sitting, BP Cuff Size: Medium Adult)   Pulse 80   Temp 97.8 °F (36.6 °C) (Tympanic)   Resp 16   Ht 1.549 m (5' 1\")   Wt 65.8 kg (145 lb)   LMP 08/01/2006   SpO2 99%   BMI 27.40 kg/m²      Physical Exam  Vitals and nursing note reviewed.   Constitutional:       General: She is not in acute distress.     Appearance: Normal appearance. She is not ill-appearing.   HENT:      Head: Normocephalic.      Jaw: Tenderness and swelling present.        Mouth/Throat:      Dentition: Dental tenderness and dental abscesses present.     Cardiovascular:      Rate and Rhythm: Normal rate and regular rhythm.      Heart sounds: Normal heart sounds.   Pulmonary:      Effort: Pulmonary effort is normal.      Breath sounds: Normal breath sounds.   Musculoskeletal:      Cervical back: Neck

## 2025-07-29 ENCOUNTER — TELEPHONE (OUTPATIENT)
Dept: FAMILY MEDICINE CLINIC | Age: 45
End: 2025-07-29

## 2025-07-31 ENCOUNTER — OFFICE VISIT (OUTPATIENT)
Dept: PRIMARY CARE CLINIC | Age: 45
End: 2025-07-31
Payer: COMMERCIAL

## 2025-07-31 VITALS
TEMPERATURE: 97.6 F | SYSTOLIC BLOOD PRESSURE: 125 MMHG | DIASTOLIC BLOOD PRESSURE: 72 MMHG | BODY MASS INDEX: 27.75 KG/M2 | HEART RATE: 70 BPM | RESPIRATION RATE: 18 BRPM | OXYGEN SATURATION: 96 % | WEIGHT: 147 LBS | HEIGHT: 61 IN
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
## 2025-07-31 DIAGNOSIS — K08.89 PAIN, DENTAL: Primary | ICD-10-CM

## 2025-07-31 DIAGNOSIS — S00.06XA TICK BITE OF SCALP, INITIAL ENCOUNTER: ICD-10-CM

## 2025-07-31 PROCEDURE — 99213 OFFICE O/P EST LOW 20 MIN: CPT | Performed by: FAMILY MEDICINE

## 2025-07-31 RX ORDER — DOXYCYCLINE HYCLATE 100 MG
100 TABLET ORAL 2 TIMES DAILY
Qty: 14 TABLET | Refills: 0 | Status: SHIPPED | OUTPATIENT
Start: 2025-07-31 | End: 2025-08-07

## 2025-07-31 RX ORDER — HYDROCODONE BITARTRATE AND ACETAMINOPHEN 5; 325 MG/1; MG/1
1 TABLET ORAL EVERY 6 HOURS PRN
Qty: 28 TABLET | Refills: 0 | Status: SHIPPED | OUTPATIENT
Start: 2025-07-31 | End: 2025-08-07

## 2025-07-31 ASSESSMENT — ENCOUNTER SYMPTOMS
EYES NEGATIVE: 1
RESPIRATORY NEGATIVE: 1
GASTROINTESTINAL NEGATIVE: 1

## 2025-07-31 NOTE — PROGRESS NOTES
Subjective:      Patient ID: Roxana Mattson is a 45 y.o. female.    HPI  acute walk in clinic visit for dental pain.  Lower left /single tooth.  Putting off work on this tooth, now painful.  Sensitive to temperature changes.  Ongoing pain about a week. Has appt. One week from now with the dentist for definitive care.      Past Medical History:   Diagnosis Date    Atrophic vaginitis 03/10/2016    Chronic fatigue     Constipation     Endometriosis     h/o endometriosis for which she had a hysterectomy    Genital herpes     Hyperlipidemia     Migraine     migraine cephalgia    Neurocardiogenic syncope     Ovarian cyst     Pelvic inflammatory disease (PID)     Pneumonia due to infectious organism     Sinusitis, chronic     chronic sinusitis probably related to tobacco abuse    Tobacco abuse     Tobacco use disorder     Vitamin D deficiency      Past Surgical History:   Procedure Laterality Date    APPENDECTOMY      CATARACT REMOVAL WITH IMPLANT Bilateral 01/23/2002    LAPAROSCOPY      X3 before the hysterectomy    OTHER SURGICAL HISTORY  2016    lump removed lower right leg    KRISTIN AND BSO (CERVIX REMOVED)  2006    total with bilateral salpingo-oophorectomy    WISDOM TOOTH EXTRACTION       Allergies   Allergen Reactions    Amoxicillin Nausea Only     GI UPSET    Other      seaosanal allergies      Pcn [Penicillins] Nausea Only     GI UPSET         Review of Systems   Constitutional: Negative.  Negative for fever.   HENT:  Positive for dental problem.    Eyes: Negative.    Respiratory: Negative.     Cardiovascular: Negative.    Gastrointestinal: Negative.    Genitourinary: Negative.    Musculoskeletal: Negative.    Skin: Negative.    Neurological: Negative.    Psychiatric/Behavioral: Negative.         Objective:   Physical Exam  Constitutional:       General: She is not in acute distress.     Appearance: She is well-developed.   HENT:      Head: Normocephalic and atraumatic.      Right Ear: External ear normal.

## 2025-08-04 ENCOUNTER — HOSPITAL ENCOUNTER (OUTPATIENT)
Dept: LAB | Age: 45
Discharge: HOME | End: 2025-08-04
Payer: COMMERCIAL

## 2025-08-04 ENCOUNTER — OFFICE VISIT (OUTPATIENT)
Dept: FAMILY MEDICINE CLINIC | Age: 45
End: 2025-08-04
Payer: COMMERCIAL

## 2025-08-04 VITALS
HEIGHT: 61 IN | RESPIRATION RATE: 16 BRPM | HEART RATE: 63 BPM | WEIGHT: 147 LBS | SYSTOLIC BLOOD PRESSURE: 104 MMHG | OXYGEN SATURATION: 97 % | BODY MASS INDEX: 27.75 KG/M2 | DIASTOLIC BLOOD PRESSURE: 68 MMHG

## 2025-08-04 DIAGNOSIS — R55 NEUROCARDIOGENIC SYNCOPE: Primary | ICD-10-CM

## 2025-08-04 DIAGNOSIS — Z13.1 DIABETES MELLITUS SCREENING: ICD-10-CM

## 2025-08-04 DIAGNOSIS — Z12.11 COLON CANCER SCREENING: ICD-10-CM

## 2025-08-04 DIAGNOSIS — K04.7 DENTAL ABSCESS: ICD-10-CM

## 2025-08-04 DIAGNOSIS — J41.1 MUCOPURULENT CHRONIC BRONCHITIS (HCC): ICD-10-CM

## 2025-08-04 DIAGNOSIS — Z72.0 TOBACCO ABUSE: ICD-10-CM

## 2025-08-04 DIAGNOSIS — K21.9 GASTROESOPHAGEAL REFLUX DISEASE, UNSPECIFIED WHETHER ESOPHAGITIS PRESENT: ICD-10-CM

## 2025-08-04 DIAGNOSIS — G43.009 MIGRAINE WITHOUT AURA AND WITHOUT STATUS MIGRAINOSUS, NOT INTRACTABLE: ICD-10-CM

## 2025-08-04 DIAGNOSIS — F51.04 PSYCHOPHYSIOLOGICAL INSOMNIA: ICD-10-CM

## 2025-08-04 DIAGNOSIS — E78.2 MIXED HYPERLIPIDEMIA: ICD-10-CM

## 2025-08-04 LAB
ALBUMIN SERPL-MCNC: 4.6 G/DL (ref 3.5–5.2)
ALBUMIN/GLOB SERPL: 1.7 {RATIO} (ref 1–2.5)
ALP SERPL-CCNC: 113 U/L (ref 35–104)
ALT SERPL-CCNC: 38 U/L (ref 10–35)
ANION GAP SERPL CALCULATED.3IONS-SCNC: 12 MMOL/L (ref 9–16)
AST SERPL-CCNC: 32 U/L (ref 10–35)
BILIRUB SERPL-MCNC: <0.2 MG/DL (ref 0–1.2)
BUN SERPL-MCNC: 18 MG/DL (ref 6–20)
BUN/CREAT SERPL: 20 (ref 9–20)
CALCIUM SERPL-MCNC: 9.6 MG/DL (ref 8.6–10.4)
CHLORIDE SERPL-SCNC: 103 MMOL/L (ref 98–107)
CHOLEST SERPL-MCNC: 188 MG/DL (ref 0–199)
CHOLESTEROL/HDL RATIO: 5.9
CO2 SERPL-SCNC: 26 MMOL/L (ref 20–31)
CREAT SERPL-MCNC: 0.9 MG/DL (ref 0.6–0.9)
EST. AVERAGE GLUCOSE BLD GHB EST-MCNC: 114 MG/DL
GFR, ESTIMATED: 79 ML/MIN/1.73M2
GLUCOSE SERPL-MCNC: 79 MG/DL (ref 74–99)
HBA1C MFR BLD: 5.6 % (ref 4–6)
HDLC SERPL-MCNC: 32 MG/DL
LDLC SERPL CALC-MCNC: 97 MG/DL (ref 0–100)
POTASSIUM SERPL-SCNC: 4.4 MMOL/L (ref 3.7–5.3)
PROT SERPL-MCNC: 7.3 G/DL (ref 6.6–8.7)
SODIUM SERPL-SCNC: 141 MMOL/L (ref 136–145)
TRIGL SERPL-MCNC: 293 MG/DL
VLDLC SERPL CALC-MCNC: 59 MG/DL (ref 1–30)

## 2025-08-04 PROCEDURE — 83036 HEMOGLOBIN GLYCOSYLATED A1C: CPT

## 2025-08-04 PROCEDURE — 80053 COMPREHEN METABOLIC PANEL: CPT

## 2025-08-04 PROCEDURE — 36415 COLL VENOUS BLD VENIPUNCTURE: CPT

## 2025-08-04 PROCEDURE — 80061 LIPID PANEL: CPT

## 2025-08-04 PROCEDURE — 99214 OFFICE O/P EST MOD 30 MIN: CPT | Performed by: NURSE PRACTITIONER

## 2025-08-04 RX ORDER — CEFUROXIME AXETIL 250 MG/1
12 TABLET ORAL
Qty: 30 ML | Refills: 2 | Status: SHIPPED | OUTPATIENT
Start: 2025-08-04

## 2025-08-04 ASSESSMENT — PATIENT HEALTH QUESTIONNAIRE - PHQ9
2. FEELING DOWN, DEPRESSED OR HOPELESS: NOT AT ALL
3. TROUBLE FALLING OR STAYING ASLEEP: NOT AT ALL
5. POOR APPETITE OR OVEREATING: NOT AT ALL
SUM OF ALL RESPONSES TO PHQ QUESTIONS 1-9: 0
SUM OF ALL RESPONSES TO PHQ QUESTIONS 1-9: 0
1. LITTLE INTEREST OR PLEASURE IN DOING THINGS: NOT AT ALL
8. MOVING OR SPEAKING SO SLOWLY THAT OTHER PEOPLE COULD HAVE NOTICED. OR THE OPPOSITE, BEING SO FIGETY OR RESTLESS THAT YOU HAVE BEEN MOVING AROUND A LOT MORE THAN USUAL: NOT AT ALL
10. IF YOU CHECKED OFF ANY PROBLEMS, HOW DIFFICULT HAVE THESE PROBLEMS MADE IT FOR YOU TO DO YOUR WORK, TAKE CARE OF THINGS AT HOME, OR GET ALONG WITH OTHER PEOPLE: NOT DIFFICULT AT ALL
7. TROUBLE CONCENTRATING ON THINGS, SUCH AS READING THE NEWSPAPER OR WATCHING TELEVISION: NOT AT ALL
SUM OF ALL RESPONSES TO PHQ QUESTIONS 1-9: 0
6. FEELING BAD ABOUT YOURSELF - OR THAT YOU ARE A FAILURE OR HAVE LET YOURSELF OR YOUR FAMILY DOWN: NOT AT ALL
SUM OF ALL RESPONSES TO PHQ QUESTIONS 1-9: 0
4. FEELING TIRED OR HAVING LITTLE ENERGY: NOT AT ALL
9. THOUGHTS THAT YOU WOULD BE BETTER OFF DEAD, OR OF HURTING YOURSELF: NOT AT ALL

## 2025-08-04 ASSESSMENT — ENCOUNTER SYMPTOMS
CONSTIPATION: 0
BLOOD IN STOOL: 0
ABDOMINAL PAIN: 0
DIARRHEA: 0
SHORTNESS OF BREATH: 0

## 2025-08-11 ENCOUNTER — TELEPHONE (OUTPATIENT)
Dept: FAMILY MEDICINE CLINIC | Age: 45
End: 2025-08-11

## 2025-08-11 DIAGNOSIS — K04.7 DENTAL ABSCESS: Primary | ICD-10-CM

## 2025-08-11 RX ORDER — OXYCODONE HYDROCHLORIDE 5 MG/1
TABLET ORAL
COMMUNITY
Start: 2025-08-07

## 2025-08-11 RX ORDER — TRAMADOL HYDROCHLORIDE 50 MG/1
50 TABLET ORAL EVERY 6 HOURS PRN
Qty: 12 TABLET | Refills: 0 | Status: SHIPPED | OUTPATIENT
Start: 2025-08-11 | End: 2025-08-14

## 2025-08-11 RX ORDER — DOXYCYCLINE 100 MG/1
TABLET ORAL
COMMUNITY
Start: 2025-08-07